# Patient Record
Sex: FEMALE | Race: WHITE | Employment: OTHER | URBAN - METROPOLITAN AREA
[De-identification: names, ages, dates, MRNs, and addresses within clinical notes are randomized per-mention and may not be internally consistent; named-entity substitution may affect disease eponyms.]

---

## 2017-02-17 ENCOUNTER — APPOINTMENT (OUTPATIENT)
Dept: GENERAL RADIOLOGY | Age: 82
DRG: 871 | End: 2017-02-17
Payer: MEDICARE

## 2017-02-17 ENCOUNTER — APPOINTMENT (OUTPATIENT)
Dept: GENERAL RADIOLOGY | Age: 82
DRG: 871 | End: 2017-02-17
Attending: EMERGENCY MEDICINE
Payer: MEDICARE

## 2017-02-17 ENCOUNTER — HOSPITAL ENCOUNTER (INPATIENT)
Age: 82
LOS: 3 days | Discharge: HOME HEALTH | DRG: 871 | End: 2017-02-20
Attending: EMERGENCY MEDICINE | Admitting: INTERNAL MEDICINE
Payer: MEDICARE

## 2017-02-17 DIAGNOSIS — A41.9 SEPSIS, DUE TO UNSPECIFIED ORGANISM: ICD-10-CM

## 2017-02-17 DIAGNOSIS — R41.82 ALTERED MENTAL STATUS, UNSPECIFIED ALTERED MENTAL STATUS TYPE: Primary | ICD-10-CM

## 2017-02-17 PROBLEM — I51.89 DIASTOLIC DYSFUNCTION: Chronic | Status: ACTIVE | Noted: 2017-02-17

## 2017-02-17 PROBLEM — N18.30 CKD (CHRONIC KIDNEY DISEASE) STAGE 3, GFR 30-59 ML/MIN (HCC): Chronic | Status: ACTIVE | Noted: 2017-02-17

## 2017-02-17 PROBLEM — I95.9 HYPOTENSION: Status: ACTIVE | Noted: 2017-02-17

## 2017-02-17 PROBLEM — R09.02 HYPOXIA: Status: ACTIVE | Noted: 2017-02-17

## 2017-02-17 PROBLEM — F02.80 LEWY BODY DEMENTIA (HCC): Chronic | Status: ACTIVE | Noted: 2017-02-17

## 2017-02-17 PROBLEM — G93.40 ACUTE ENCEPHALOPATHY: Status: ACTIVE | Noted: 2017-02-17

## 2017-02-17 PROBLEM — G31.83 LEWY BODY DEMENTIA (HCC): Chronic | Status: ACTIVE | Noted: 2017-02-17

## 2017-02-17 LAB
ALBUMIN SERPL BCP-MCNC: 3.1 G/DL (ref 3.2–4.6)
ALBUMIN/GLOB SERPL: 0.8 {RATIO} (ref 1.2–3.5)
ALP SERPL-CCNC: 114 U/L (ref 50–136)
ALT SERPL-CCNC: 20 U/L (ref 12–65)
AMORPH CRY URNS QL MICRO: ABNORMAL
ANION GAP BLD CALC-SCNC: 10 MMOL/L (ref 7–16)
ARTERIAL PATENCY WRIST A: POSITIVE
AST SERPL W P-5'-P-CCNC: 26 U/L (ref 15–37)
BACTERIA URNS QL MICRO: ABNORMAL /HPF
BASE DEFICIT BLDA-SCNC: 3.4 MMOL/L (ref 0–2)
BASOPHILS # BLD AUTO: 0 K/UL (ref 0–0.2)
BASOPHILS # BLD: 0 % (ref 0–2)
BDY SITE: ABNORMAL
BILIRUB SERPL-MCNC: 0.4 MG/DL (ref 0.2–1.1)
BUN SERPL-MCNC: 26 MG/DL (ref 8–23)
CALCIUM SERPL-MCNC: 9.1 MG/DL (ref 8.3–10.4)
CASTS URNS QL MICRO: 0 /LPF
CHLORIDE SERPL-SCNC: 96 MMOL/L (ref 98–107)
CO2 SERPL-SCNC: 27 MMOL/L (ref 21–32)
COHGB MFR BLD: 0.3 % (ref 0.5–1.5)
CREAT SERPL-MCNC: 1.3 MG/DL (ref 0.6–1)
CRYSTALS URNS QL MICRO: 0 /LPF
DIFFERENTIAL METHOD BLD: ABNORMAL
DO-HGB BLD-MCNC: 5 % (ref 0–5)
EOSINOPHIL # BLD: 0.5 K/UL (ref 0–0.8)
EOSINOPHIL NFR BLD: 3 % (ref 0.5–7.8)
EPI CELLS #/AREA URNS HPF: ABNORMAL /HPF
ERYTHROCYTE [DISTWIDTH] IN BLOOD BY AUTOMATED COUNT: 12.3 % (ref 11.9–14.6)
FIO2 ON VENT: 30 %
GAS FLOW.O2 O2 DELIVERY SYS: 2.5 L/MIN
GLOBULIN SER CALC-MCNC: 4 G/DL (ref 2.3–3.5)
GLUCOSE SERPL-MCNC: 116 MG/DL (ref 65–100)
HCO3 BLDA-SCNC: 20 MMOL/L (ref 22–26)
HCT VFR BLD AUTO: 31.6 % (ref 35.8–46.3)
HGB BLD-MCNC: 10.3 G/DL (ref 11.7–15.4)
HGB BLDMV-MCNC: 9.9 GM/DL (ref 11.7–15)
IMM GRANULOCYTES # BLD: 0.1 K/UL (ref 0–0.5)
IMM GRANULOCYTES NFR BLD AUTO: 0.3 % (ref 0–5)
LACTATE BLD-SCNC: 1.1 MMOL/L (ref 0.5–1.9)
LYMPHOCYTES # BLD AUTO: 3 % (ref 13–44)
LYMPHOCYTES # BLD: 0.5 K/UL (ref 0.5–4.6)
MCH RBC QN AUTO: 30.5 PG (ref 26.1–32.9)
MCHC RBC AUTO-ENTMCNC: 32.6 G/DL (ref 31.4–35)
MCV RBC AUTO: 93.5 FL (ref 79.6–97.8)
METHGB MFR BLD: 0.4 % (ref 0–1.5)
MONOCYTES # BLD: 0.5 K/UL (ref 0.1–1.3)
MONOCYTES NFR BLD AUTO: 3 % (ref 4–12)
MUCOUS THREADS URNS QL MICRO: 0 /LPF
NEUTS SEG # BLD: 15.2 K/UL (ref 1.7–8.2)
NEUTS SEG NFR BLD AUTO: 91 % (ref 43–78)
OXYHGB MFR BLDA: 94.6 % (ref 94–97)
PCO2 BLDA: 31 MMHG (ref 35–45)
PH BLDA: 7.43 [PH] (ref 7.35–7.45)
PLATELET # BLD AUTO: 260 K/UL (ref 150–450)
PMV BLD AUTO: 9.9 FL (ref 10.8–14.1)
PO2 BLDA: 78 MMHG (ref 75–100)
POTASSIUM SERPL-SCNC: 4.5 MMOL/L (ref 3.5–5.1)
PROCALCITONIN SERPL-MCNC: 13 NG/ML
PROT SERPL-MCNC: 7.1 G/DL (ref 6.3–8.2)
RBC # BLD AUTO: 3.38 M/UL (ref 4.05–5.25)
RBC #/AREA URNS HPF: ABNORMAL /HPF
SAO2 % BLD: 95 % (ref 92–98.5)
SERVICE CMNT-IMP: ABNORMAL
SODIUM SERPL-SCNC: 133 MMOL/L (ref 136–145)
TROPONIN I SERPL-MCNC: 0.07 NG/ML (ref 0.02–0.05)
VENTILATION MODE VENT: ABNORMAL
WBC # BLD AUTO: 16.8 K/UL (ref 4.3–11.1)
WBC URNS QL MICRO: ABNORMAL /HPF

## 2017-02-17 PROCEDURE — 83605 ASSAY OF LACTIC ACID: CPT

## 2017-02-17 PROCEDURE — 99285 EMERGENCY DEPT VISIT HI MDM: CPT | Performed by: EMERGENCY MEDICINE

## 2017-02-17 PROCEDURE — 84484 ASSAY OF TROPONIN QUANT: CPT | Performed by: EMERGENCY MEDICINE

## 2017-02-17 PROCEDURE — 93005 ELECTROCARDIOGRAM TRACING: CPT | Performed by: EMERGENCY MEDICINE

## 2017-02-17 PROCEDURE — 74011000258 HC RX REV CODE- 258: Performed by: EMERGENCY MEDICINE

## 2017-02-17 PROCEDURE — 85025 COMPLETE CBC W/AUTO DIFF WBC: CPT | Performed by: EMERGENCY MEDICINE

## 2017-02-17 PROCEDURE — 87086 URINE CULTURE/COLONY COUNT: CPT | Performed by: EMERGENCY MEDICINE

## 2017-02-17 PROCEDURE — 71020 XR CHEST PA LAT: CPT

## 2017-02-17 PROCEDURE — 74011250636 HC RX REV CODE- 250/636

## 2017-02-17 PROCEDURE — 86580 TB INTRADERMAL TEST: CPT

## 2017-02-17 PROCEDURE — 65660000000 HC RM CCU STEPDOWN

## 2017-02-17 PROCEDURE — 77030011943

## 2017-02-17 PROCEDURE — 96375 TX/PRO/DX INJ NEW DRUG ADDON: CPT | Performed by: EMERGENCY MEDICINE

## 2017-02-17 PROCEDURE — 80053 COMPREHEN METABOLIC PANEL: CPT | Performed by: EMERGENCY MEDICINE

## 2017-02-17 PROCEDURE — 36600 WITHDRAWAL OF ARTERIAL BLOOD: CPT

## 2017-02-17 PROCEDURE — 74011000302 HC RX REV CODE- 302

## 2017-02-17 PROCEDURE — 74011250636 HC RX REV CODE- 250/636: Performed by: EMERGENCY MEDICINE

## 2017-02-17 PROCEDURE — 74011000258 HC RX REV CODE- 258

## 2017-02-17 PROCEDURE — 81003 URINALYSIS AUTO W/O SCOPE: CPT | Performed by: EMERGENCY MEDICINE

## 2017-02-17 PROCEDURE — 87040 BLOOD CULTURE FOR BACTERIA: CPT | Performed by: EMERGENCY MEDICINE

## 2017-02-17 PROCEDURE — 96365 THER/PROPH/DIAG IV INF INIT: CPT | Performed by: EMERGENCY MEDICINE

## 2017-02-17 PROCEDURE — 81015 MICROSCOPIC EXAM OF URINE: CPT | Performed by: EMERGENCY MEDICINE

## 2017-02-17 PROCEDURE — 84145 PROCALCITONIN (PCT): CPT | Performed by: EMERGENCY MEDICINE

## 2017-02-17 PROCEDURE — 51701 INSERT BLADDER CATHETER: CPT | Performed by: EMERGENCY MEDICINE

## 2017-02-17 PROCEDURE — 82803 BLOOD GASES ANY COMBINATION: CPT

## 2017-02-17 PROCEDURE — 74011250637 HC RX REV CODE- 250/637

## 2017-02-17 RX ORDER — SERTRALINE HYDROCHLORIDE 100 MG/1
100 TABLET, FILM COATED ORAL DAILY
Status: DISCONTINUED | OUTPATIENT
Start: 2017-02-18 | End: 2017-02-18

## 2017-02-17 RX ORDER — PANTOPRAZOLE SODIUM 40 MG/1
40 TABLET, DELAYED RELEASE ORAL
Status: DISCONTINUED | OUTPATIENT
Start: 2017-02-17 | End: 2017-02-20 | Stop reason: HOSPADM

## 2017-02-17 RX ORDER — ACETAMINOPHEN 500 MG
500 TABLET ORAL
Status: DISCONTINUED | OUTPATIENT
Start: 2017-02-17 | End: 2017-02-20 | Stop reason: HOSPADM

## 2017-02-17 RX ORDER — LANOLIN ALCOHOL/MO/W.PET/CERES
1000 CREAM (GRAM) TOPICAL DAILY
Status: DISCONTINUED | OUTPATIENT
Start: 2017-02-18 | End: 2017-02-20 | Stop reason: HOSPADM

## 2017-02-17 RX ORDER — SODIUM CHLORIDE 0.9 % (FLUSH) 0.9 %
5 SYRINGE (ML) INJECTION EVERY 8 HOURS
Status: DISCONTINUED | OUTPATIENT
Start: 2017-02-17 | End: 2017-02-20 | Stop reason: HOSPADM

## 2017-02-17 RX ORDER — ESOMEPRAZOLE MAGNESIUM 40 MG/1
40 CAPSULE, DELAYED RELEASE ORAL DAILY
COMMUNITY

## 2017-02-17 RX ORDER — ROSUVASTATIN CALCIUM 5 MG/1
5 TABLET, COATED ORAL
Status: DISCONTINUED | OUTPATIENT
Start: 2017-02-17 | End: 2017-02-20 | Stop reason: HOSPADM

## 2017-02-17 RX ORDER — DICLOFENAC SODIUM 10 MG/G
2 GEL TOPICAL 4 TIMES DAILY
COMMUNITY

## 2017-02-17 RX ORDER — LANOLIN ALCOHOL/MO/W.PET/CERES
1000 CREAM (GRAM) TOPICAL DAILY
COMMUNITY

## 2017-02-17 RX ORDER — DOCUSATE SODIUM 100 MG/1
100 CAPSULE, LIQUID FILLED ORAL 3 TIMES DAILY
COMMUNITY

## 2017-02-17 RX ORDER — DULOXETIN HYDROCHLORIDE 60 MG/1
60 CAPSULE, DELAYED RELEASE ORAL
COMMUNITY

## 2017-02-17 RX ORDER — ACETAMINOPHEN 500 MG
500 TABLET ORAL
COMMUNITY

## 2017-02-17 RX ORDER — LANOLIN ALCOHOL/MO/W.PET/CERES
325 CREAM (GRAM) TOPICAL 2 TIMES DAILY WITH MEALS
Status: DISCONTINUED | OUTPATIENT
Start: 2017-02-18 | End: 2017-02-20 | Stop reason: HOSPADM

## 2017-02-17 RX ORDER — ONDANSETRON 2 MG/ML
4 INJECTION INTRAMUSCULAR; INTRAVENOUS
Status: COMPLETED | OUTPATIENT
Start: 2017-02-17 | End: 2017-02-17

## 2017-02-17 RX ORDER — SODIUM CHLORIDE 0.9 % (FLUSH) 0.9 %
5-10 SYRINGE (ML) INJECTION AS NEEDED
Status: DISCONTINUED | OUTPATIENT
Start: 2017-02-17 | End: 2017-02-20 | Stop reason: HOSPADM

## 2017-02-17 RX ORDER — HEPARIN SODIUM 5000 [USP'U]/ML
5000 INJECTION, SOLUTION INTRAVENOUS; SUBCUTANEOUS EVERY 8 HOURS
Status: DISCONTINUED | OUTPATIENT
Start: 2017-02-17 | End: 2017-02-20 | Stop reason: HOSPADM

## 2017-02-17 RX ORDER — ROSUVASTATIN CALCIUM 5 MG/1
5 TABLET, COATED ORAL
COMMUNITY

## 2017-02-17 RX ORDER — ROPINIROLE 0.5 MG/1
0.5 TABLET, FILM COATED ORAL
Status: DISCONTINUED | OUTPATIENT
Start: 2017-02-17 | End: 2017-02-20 | Stop reason: HOSPADM

## 2017-02-17 RX ORDER — GUAIFENESIN 100 MG/5ML
81 LIQUID (ML) ORAL DAILY
Status: DISCONTINUED | OUTPATIENT
Start: 2017-02-18 | End: 2017-02-20 | Stop reason: HOSPADM

## 2017-02-17 RX ORDER — DOCUSATE SODIUM 100 MG/1
100 CAPSULE, LIQUID FILLED ORAL 3 TIMES DAILY
Status: DISCONTINUED | OUTPATIENT
Start: 2017-02-17 | End: 2017-02-20 | Stop reason: HOSPADM

## 2017-02-17 RX ORDER — OXYBUTYNIN CHLORIDE 5 MG/1
5 TABLET ORAL 2 TIMES DAILY
Status: DISCONTINUED | OUTPATIENT
Start: 2017-02-17 | End: 2017-02-20 | Stop reason: HOSPADM

## 2017-02-17 RX ORDER — UREA 10 %
2 LOTION (ML) TOPICAL 2 TIMES DAILY
Status: DISCONTINUED | OUTPATIENT
Start: 2017-02-17 | End: 2017-02-20 | Stop reason: HOSPADM

## 2017-02-17 RX ORDER — CHOLECALCIFEROL (VITAMIN D3) 125 MCG
1 CAPSULE ORAL
Status: DISCONTINUED | OUTPATIENT
Start: 2017-02-18 | End: 2017-02-18

## 2017-02-17 RX ORDER — HYDROCODONE BITARTRATE AND ACETAMINOPHEN 10; 325 MG/1; MG/1
1 TABLET ORAL
Status: DISCONTINUED | OUTPATIENT
Start: 2017-02-17 | End: 2017-02-18

## 2017-02-17 RX ORDER — SIMETHICONE 80 MG
80 TABLET,CHEWABLE ORAL
Status: DISCONTINUED | OUTPATIENT
Start: 2017-02-18 | End: 2017-02-20 | Stop reason: HOSPADM

## 2017-02-17 RX ORDER — OXYCODONE HYDROCHLORIDE 5 MG/1
5 TABLET ORAL
Status: DISCONTINUED | OUTPATIENT
Start: 2017-02-17 | End: 2017-02-18

## 2017-02-17 RX ADMIN — OXYBUTYNIN CHLORIDE 5 MG: 5 TABLET ORAL at 22:45

## 2017-02-17 RX ADMIN — PANTOPRAZOLE SODIUM 40 MG: 40 TABLET, DELAYED RELEASE ORAL at 22:45

## 2017-02-17 RX ADMIN — TUBERCULIN PURIFIED PROTEIN DERIVATIVE 5 UNITS: 5 INJECTION INTRADERMAL at 22:39

## 2017-02-17 RX ADMIN — ACETAMINOPHEN 500 MG: 500 TABLET ORAL at 23:54

## 2017-02-17 RX ADMIN — DOCUSATE SODIUM 100 MG: 100 CAPSULE, LIQUID FILLED ORAL at 22:45

## 2017-02-17 RX ADMIN — CEFTRIAXONE SODIUM 2 G: 2 INJECTION, POWDER, FOR SOLUTION INTRAMUSCULAR; INTRAVENOUS at 17:22

## 2017-02-17 RX ADMIN — ROPINIROLE 0.5 MG: 0.5 TABLET, FILM COATED ORAL at 22:45

## 2017-02-17 RX ADMIN — Medication 5 ML: at 22:45

## 2017-02-17 RX ADMIN — VANCOMYCIN HYDROCHLORIDE 1000 MG: 1 INJECTION, POWDER, LYOPHILIZED, FOR SOLUTION INTRAVENOUS at 22:45

## 2017-02-17 RX ADMIN — SODIUM CHLORIDE 1000 ML: 900 INJECTION, SOLUTION INTRAVENOUS at 17:22

## 2017-02-17 RX ADMIN — AMPICILLIN SODIUM AND SULBACTAM SODIUM 3 G: 2; 1 INJECTION, POWDER, FOR SOLUTION INTRAMUSCULAR; INTRAVENOUS at 19:55

## 2017-02-17 RX ADMIN — HEPARIN SODIUM 5000 UNITS: 5000 INJECTION, SOLUTION INTRAVENOUS; SUBCUTANEOUS at 22:44

## 2017-02-17 RX ADMIN — ONDANSETRON 4 MG: 2 INJECTION, SOLUTION INTRAMUSCULAR; INTRAVENOUS at 17:22

## 2017-02-17 RX ADMIN — ROSUVASTATIN CALCIUM 5 MG: 5 TABLET, FILM COATED ORAL at 22:45

## 2017-02-17 RX ADMIN — SODIUM CHLORIDE 1000 ML: 900 INJECTION, SOLUTION INTRAVENOUS at 18:31

## 2017-02-17 RX ADMIN — LACTOBACILLUS TAB 2 TABLET: TAB at 22:45

## 2017-02-17 RX ADMIN — OXYCODONE HYDROCHLORIDE 5 MG: 5 TABLET ORAL at 22:45

## 2017-02-17 NOTE — ED PROVIDER NOTES
HPI Comments: Patient with history of high blood pressure heart disease and congestive heart failure and renal failure. Was started on an antibiotic for urinary tract infection one week ago. Macrobid. Unable to take it twice a day to 2 nausea and vomiting. Patient had fever and chills yesterday and today with some confusion waxing and waning. Went to her primary care doctor's office today and found with a blood pressure 60/47 so sent here. Once here The pressure improved the patient still with confusion. Patient is a 80 y.o. female presenting with fatigue. The history is provided by the patient. No  was used. Fatigue   This is a new problem. The current episode started yesterday. The problem has been gradually worsening. There was no focality noted. Primary symptoms include mental status change and disorientation. Pertinent negatives include no focal weakness, no loss of balance, no slurred speech, no speech difficulty, no agitation and no unresponsiveness. Patient reports a subjective fever - was not measured. Associated symptoms include confusion. Pertinent negatives include no shortness of breath, no chest pain, no vomiting, no nausea, no bowel incontinence and no bladder incontinence.         Past Medical History:   Diagnosis Date    Acute myocardial infarction of other anterior wall, initial episode of care [facility]) 8/30/2012     MI - Had a stent placed August 81,5840    Acute systolic congestive heart failure- in setting of anterior MI, EF 35-40% 5/81/0131    Acute systolic heart failure (HCC)      resolved at discharge, EF 35% at time of MI, 55% by echo prior to discharge    ARF (acute renal failure) ([facility] [place] [address] 9/29/2012    Arthritis     CAD (coronary artery disease)      MI    Carotid artery stenosis without cerebral infarction     Chronic pain- chronic narcotic use for spinal stenosis 8/30/2012    Coronary atherosclerosis of native coronary artery 3/16/2013    Diarrhea 10/2/2012    Dyslipidemia 8/30/2012    Dyspnea      unspecified    Essential hypertension, benign 8/30/2012    Heart failure (HCC)      denies heart failure    Hyperlipidemia      other unsp dyslipidemia    Hypertension      controlled, benign    Ischemic colitis ([facility] [address]     Lower GI bleed 3/12/2015    Myocardial infarction, anterolateral wall, subsequent care [facility])      s/p 2.5mm Xience stent to focal lesion in mid LAD 8/2012    N&V (nausea and vomiting) 3/16/2013    Sepsis ([facility] [address] 2/12/2014    Spinal stenosis 8/30/2012    ST elevation (STEMI) myocardial infarction involving left anterior descending coronary artery (HCC)     Syncope and collapse 2/12/2014       Past Surgical History:   Procedure Laterality Date    Hx cholecystectomy      Hx gyn       hysterectomy    Hx appendectomy      Hx orthopaedic       moreno knees, ankle and spinal cord stimulator    Hx other surgical       cord stimulator for pain control removed    Hx coronary stent placement       2012 for MI    Pr cardiac surg procedure unlist       stent         Family History:   Problem Relation Age of Onset    Hypertension Mother     Stroke Mother     Hypertension Father        Social History     Social History    Marital status:      Spouse name: N/A    Number of children: N/A    Years of education: N/A     Occupational History    Not on file. Social History Main Topics    Smoking status: Never Smoker    Smokeless tobacco: Not on file    Alcohol use No    Drug use: No    Sexual activity: Not on file     Other Topics Concern    Not on file     Social History Narrative    Lives at home independently    Has 2 children        Has living will, DNR    Channing Najera DO                 ALLERGIES: Morphine; Other medication; and Sulfa (sulfonamide antibiotics)    Review of Systems   Unable to perform ROS: Mental status change   Constitutional: Positive for fatigue.    Respiratory: Negative for shortness of breath. Cardiovascular: Negative for chest pain. Gastrointestinal: Negative for bowel incontinence, nausea and vomiting. Genitourinary: Negative for bladder incontinence. Neurological: Negative for focal weakness, speech difficulty and loss of balance. Psychiatric/Behavioral: Positive for confusion. Negative for agitation. Vitals:    02/17/17 1622   BP: 159/74   Pulse: 87   Resp: 18   Temp: (!) 100.7 °F (38.2 °C)   SpO2: 93%   Weight: 62.1 kg (137 lb)   Height: 4' 11\" (1.499 m)            Physical Exam   Constitutional: She appears well-developed and well-nourished. HENT:   Head: Normocephalic and atraumatic. Neck: Normal range of motion. Neck supple. Cardiovascular: Normal rate and regular rhythm. Pulmonary/Chest: Effort normal and breath sounds normal. She has no wheezes. Abdominal: Soft. Bowel sounds are normal. There is tenderness (suprapubic). There is no rebound and no guarding. Musculoskeletal: Normal range of motion. She exhibits no edema. Neurological: She is alert. No cranial nerve deficit. Skin: Skin is warm and dry. Nursing note and vitals reviewed. MDM  Number of Diagnoses or Management Options  Diagnosis management comments: Pt with low grade fever, elevated WBC, and elevated procalcitonin. UA clean here but on macrobid not as directed. Will treat as uti and admit. Confusion present. Amount and/or Complexity of Data Reviewed  Clinical lab tests: ordered and reviewed  Tests in the radiology section of CPT®: ordered and reviewed  Tests in the medicine section of CPT®: ordered and reviewed    Patient Progress  Patient progress: stable    ED Course       Procedures      UA neg.        Results Include:    Recent Results (from the past 24 hour(s))   CBC WITH AUTOMATED DIFF    Collection Time: 02/17/17  4:30 PM   Result Value Ref Range    WBC 16.8 (H) 4.3 - 11.1 K/uL    RBC 3.38 (L) 4.05 - 5.25 M/uL    HGB 10.3 (L) 11.7 - 15.4 g/dL    HCT 31.6 (L) 35.8 - 46.3 % MCV 93.5 79.6 - 97.8 FL    MCH 30.5 26.1 - 32.9 PG    MCHC 32.6 31.4 - 35.0 g/dL    RDW 12.3 11.9 - 14.6 %    PLATELET 756 209 - 607 K/uL    MPV 9.9 (L) 10.8 - 14.1 FL    DF AUTOMATED      NEUTROPHILS 91 (H) 43 - 78 %    LYMPHOCYTES 3 (L) 13 - 44 %    MONOCYTES 3 (L) 4.0 - 12.0 %    EOSINOPHILS 3 0.5 - 7.8 %    BASOPHILS 0 0.0 - 2.0 %    IMMATURE GRANULOCYTES 0.3 0.0 - 5.0 %    ABS. NEUTROPHILS 15.2 (H) 1.7 - 8.2 K/UL    ABS. LYMPHOCYTES 0.5 0.5 - 4.6 K/UL    ABS. MONOCYTES 0.5 0.1 - 1.3 K/UL    ABS. EOSINOPHILS 0.5 0.0 - 0.8 K/UL    ABS. BASOPHILS 0.0 0.0 - 0.2 K/UL    ABS. IMM. GRANS. 0.1 0.0 - 0.5 K/UL   METABOLIC PANEL, COMPREHENSIVE    Collection Time: 02/17/17  4:30 PM   Result Value Ref Range    Sodium 133 (L) 136 - 145 mmol/L    Potassium 4.5 3.5 - 5.1 mmol/L    Chloride 96 (L) 98 - 107 mmol/L    CO2 27 21 - 32 mmol/L    Anion gap 10 7 - 16 mmol/L    Glucose 116 (H) 65 - 100 mg/dL    BUN 26 (H) 8 - 23 MG/DL    Creatinine 1.30 (H) 0.6 - 1.0 MG/DL    GFR est AA 50 (L) >60 ml/min/1.73m2    GFR est non-AA 41 (L) >60 ml/min/1.73m2    Calcium 9.1 8.3 - 10.4 MG/DL    Bilirubin, total 0.4 0.2 - 1.1 MG/DL    ALT (SGPT) 20 12 - 65 U/L    AST (SGOT) 26 15 - 37 U/L    Alk. phosphatase 114 50 - 136 U/L    Protein, total 7.1 6.3 - 8.2 g/dL    Albumin 3.1 (L) 3.2 - 4.6 g/dL    Globulin 4.0 (H) 2.3 - 3.5 g/dL    A-G Ratio 0.8 (L) 1.2 - 3.5     TROPONIN I    Collection Time: 02/17/17  4:30 PM   Result Value Ref Range    Troponin-I, Qt. 0.07 (H) 0.02 - 0.05 NG/ML   PROCALCITONIN    Collection Time: 02/17/17  4:30 PM   Result Value Ref Range    Procalcitonin 13.0 ng/mL   POC LACTIC ACID    Collection Time: 02/17/17  4:48 PM   Result Value Ref Range    Lactic Acid (POC) 1.1 0.5 - 1.9 mmol/L     XR CHEST PA LAT (Final result) Result time: 02/17/17 17:10:42     Final result by Joaquin Johnson DO (02/17/17 17:10:42)     Impression:     IMPRESSION:  1.  Mild interstitial prominence at the lung bases could represent underlying  chronic change versus interstitial edema. 2. Hiatal hernia.       Narrative:     Two view chest:  02/17/2017    History: weakness. Comparison: 12/01/2016    Findings: The heart is normal in size. There is a large hiatal hernia. There is  mild interstitial prominence at the lung bases. There are no pleural effusions. There is diffuse osteopenia. Remote compression fracture is present at the  thoracolumbar junction.  A vascular stent overlies the upper abdomen posteriorly.

## 2017-02-17 NOTE — ED TRIAGE NOTES
Pt arrives per EMS from MD's office where they state pts blood pressure was 60/30 on patients arrival to the MD office. EMS 1st blood pressure for them 128/60. Pt went to MD's office for complaints of weakness.

## 2017-02-17 NOTE — ED NOTES
Patient urinated the bed. Bed has been changed, patient cleaned and a new brief applied. Patient's urine was straw colored and WNL.

## 2017-02-17 NOTE — IP AVS SNAPSHOT
Lenin Chou 
 
 
 [address] 
[phone] Patient: Shawnee Vanegas MRN: [id number] [facility]:8/8/0878 You are allergic to the following Allergen Reactions Morphine Nausea and Vomiting Other Medication Other (comments) \"Took steroids this week and made my face red. \"  
    
 Sulfa (Sulfonamide Antibiotics) Rash Immunizations Administered for This Admission Name Date  
 TB Skin Test (PPD) Intradermal 2/17/2017 Recent Documentation Height Weight BMI OB Status Smoking Status 1.499 m 62.1 kg 27.67 kg/m2 Hysterectomy Never Smoker Unresulted Labs Order Current Status CULTURE, BLOOD Preliminary result CULTURE, BLOOD Preliminary result Emergency Contacts Name Discharge Info Relation Home Work Mobile Charleen Vasquez  Other Relative [6] [phone] CHIQUITA Patrick  Child [2] [phone] KobeGuillermina duarte  Other Relative [6] [phone] About your hospitalization You were admitted on:  February 17, 2017 You last received care in the:  [facility] SUR[facility] You were discharged on:  February 20, 2017 Unit phone number:  [phone] Why you were hospitalized Your primary diagnosis was:  Acute Cystitis Without Hematuria Your diagnoses also included:  Sepsis Due To Escherichia Coli (Hcc), Hypoxia, Encephalopathy Due To Infection, Lewy Body Dementia, Spinal Stenosis, Essential Hypertension, Benign, Dyslipidemia, Chronic Pain, Coronary Atherosclerosis Of Native Coronary Artery, Ckd (Chronic Kidney Disease) Stage 3, Gfr 30-59 Ml/Min, Hypotension, Diastolic Dysfunction, Dnr (Do Not Resuscitate) Providers Seen During Your Hospitalizations Provider Role Specialty Primary office phone Hugo Moise MD Attending Provider Emergency Medicine [phone] Maria M Mota DO Attending Provider Internal Medicine [phone] Roberth Bustamante MD Attending Provider Internal Medicine [phone] Your Primary Care Physician (PCP) Primary Care Physician Office Phone Office Fax [address] [facility], [address] Kiersten Kasper [phone] Follow-up Information Follow up With Details Comments Contact Info Jeimy Naranjo MD On 2/28/2017 at 9:[address] [place] [address] 
[phone] [address] [facility] [address]   [address] [place] [address] [address] [id number] [phone] Current Discharge Medication List  
  
START taking these medications Dose & Instructions Dispensing Information Comments Morning Noon Evening Bedtime  
 cefpodoxime 200 mg tablet Commonly known as:  Bill Ligas Your next dose is: Today Dose:  200 mg Take 1 Tab by mouth every twelve (12) hours for 3 days. Quantity:  6 Tab Refills:  0 CONTINUE these medications which have CHANGED Dose & Instructions Dispensing Information Comments Morning Noon Evening Bedtime  
 carvedilol 3.125 mg tablet Commonly known as:  Jestine Pellet What changed:  how much to take Your next dose is: Today Dose:  3.125 mg Take 1 Tab by mouth two (2) times daily (with meals). Quantity:  60 Tab Refills:  0  
     
   
   
  
   
  
 ferrous sulfate 325 mg (65 mg iron) EC tablet Commonly known as:  IRON What changed:  Another medication with the same name was removed. Continue taking this medication, and follow the directions you see here. Your next dose is: Today Dose:  325 mg Take 1 Tab by mouth two (2) times a day. Quantity:  60 Tab Refills:  0  
     
   
   
  
   
  
 lisinopril 20 mg tablet Commonly known as:  Caprice Loss What changed:   
- how much to take 
- additional instructions Your next dose is:  Tomorrow Dose:  30 mg Take 1.5 Tabs by mouth daily. Quantity:  45 Tab Refills:  0 ondansetron 4 mg disintegrating tablet Commonly known as:  ZOFRAN ODT What changed:  how much to take Dose:  4 mg Take 1 Tab by mouth every eight (8) hours as needed for Nausea. Quantity:  10 Tab Refills:  0  
     
   
   
   
  
 oxyCODONE IR 5 mg immediate release tablet Commonly known as:  Eric Strickland What changed:  additional instructions Dose:  5 mg Take 1 Tab by mouth every four (4) hours as needed for Pain. Max Daily Amount: 30 mg.  
 Quantity:  90 Tab Refills:  0 CONTINUE these medications which have NOT CHANGED Dose & Instructions Dispensing Information Comments Morning Noon Evening Bedtime  
 acetaminophen 500 mg tablet Commonly known as:  TYLENOL Dose:  500 mg Take 500 mg by mouth every six (6) hours as needed for Pain. Indications: Pain Refills:  0  
     
   
   
   
  
 aspirin 81 mg chewable tablet Your next dose is:  Tomorrow Dose:  81 mg Take 1 Tab by mouth daily. Resume Aspirin on March 3rd (  Will complete 7 days off   ASA ) Quantity:  30 Tab Refills:  0  
     
   
   
   
  
 COLACE 100 mg capsule Generic drug:  docusate sodium Your next dose is: Today Dose:  100 mg Take 100 mg by mouth three (3) times daily. Indications: Constipation Refills:  0  
     
   
   
  
   
  
  
 CRESTOR 5 mg tablet Generic drug:  rosuvastatin Your next dose is: Today Dose:  5 mg Take 5 mg by mouth every Monday, Wednesday, Friday. Refills:  0  
     
   
   
   
  
  
 cyanocobalamin 1,000 mcg tablet Your next dose is:  Tomorrow Dose:  1000 mcg Take 1,000 mcg by mouth daily. Indications: PREVENTION OF VITAMIN B12 DEFICIENCY Refills:  0  
     
   
   
   
  
 diclofenac 1 % Gel Commonly known as:  VOLTAREN Your next dose is: Today Dose:  2 g Apply 2 g to affected area four (4) times daily. Indications: OSTEOARTHRITIS Refills:  0 DULoxetine 60 mg capsule Commonly known as:  CYMBALTA Your next dose is: Today Dose:  60 mg Take 60 mg by mouth nightly. Refills:  0  
     
   
   
   
  
  
 furosemide 20 mg tablet Commonly known as:  LASIX Dose:  20 mg Take 1 Tab by mouth daily as needed. Quantity:  15 Tab Refills:  5 HYDROcodone-acetaminophen  mg tablet Commonly known as:  Nany Smith Dose:  1 Tab Take 1 Tab by mouth every six (6) hours as needed for Pain. Max Daily Amount: 4 Tabs. Quantity:  20 Tab Refills:  0  
     
   
   
   
  
 lactase 3,000 unit tablet Commonly known as:  Owens Ee Dose:  1 Tab Take 1 Tab by mouth three (3) times daily (with meals). Take if patient will eat dairy products Quantity:  90 Tab Refills:  0 NexIUM 40 mg capsule Generic drug:  esomeprazole Your next dose is:  Tomorrow Take  by mouth daily. Refills:  0  
     
   
   
   
  
 nitroglycerin 0.4 mg SL tablet Commonly known as:  NITROSTAT Dose:  0.4 mg  
1 Tab by SubLINGual route every five (5) minutes as needed for Chest Pain. Quantity:  1 Bottle Refills:  3  
     
   
   
   
  
 oxybutynin 5 mg tablet Commonly known as:  BHOTNKUL Your next dose is: Today Dose:  5 mg Take 5 mg by mouth two (2) times a day. Refills:  0  
     
   
   
  
   
  
 pantoprazole 40 mg tablet Commonly known as:  PROTONIX Your next dose is: Today Dose:  40 mg Take 1 Tab by mouth two (2) times a day. Quantity:  60 Tab Refills:  6 PEPCID COMPLETE PO Your next dose is:  Tomorrow Take  by mouth. Refills:  0  
     
   
   
   
  
 potassium chloride 10 mEq tablet Commonly known as:  K-DUR KLOR-CON Dose:  10 mEq Take 1 Tab by mouth daily as needed. Only take this med on days when she  takes Lasix the two go together Quantity:  15 Tab Refills:  5 PROBIOTIC 4X 10-15 mg Tbec Generic drug:  B.infantis-B.ani-B.long-B.bifi Take  by mouth. Refills:  0  
     
   
   
   
  
 rOPINIRole 5 mg tablet Commonly known as:  Baron Yañez Your next dose is: Today Dose:  0.5 mg Take 0.5 mg by mouth nightly. Refills:  0  
     
   
   
   
  
  
 simethicone 80 mg chewable tablet Commonly known as:  Maria Elena Rodriguez Your next dose is: Today Dose:  80 mg Take 1 Tab by mouth Before breakfast, lunch, and dinner. Quantity:  90 Tab Refills:  0  
     
   
   
  
   
  
 traMADol 50 mg tablet Commonly known as:  ULTRAM  
   
 Dose:  50 mg Take 1 Tab by mouth every six (6) hours as needed. Max Daily Amount: 200 mg. Quantity:  90 Tab Refills:  0  
     
   
   
   
  
 ZOLOFT 100 mg tablet Generic drug:  sertraline Your next dose is:  Tomorrow Dose:  100 mg Take 100 mg by mouth daily. Refills:  0 Where to Get Your Medications Information on where to get these meds will be given to you by the nurse or doctor. ! Ask your nurse or doctor about these medications  
  cefpodoxime 200 mg tablet Discharge Instructions DISCHARGE SUMMARY from Nurse The following personal items are in your possession at time of discharge: 
 
Dental Appliances: Partials Visual Aid: Glasses Home Medications: None Jewelry: With patient Clothing: With patient Other Valuables: None PATIENT INSTRUCTIONS: 
 
 
F-face looks uneven A-arms unable to move or move unevenly S-speech slurred or non-existent T-time-call 911 as soon as signs and symptoms begin-DO NOT go Back to bed or wait to see if you get better-TIME IS BRAIN. Warning Signs of HEART ATTACK Call 911 if you have these symptoms: 
? Chest discomfort. Most heart attacks involve discomfort in the center of the chest that lasts more than a few minutes, or that goes away and comes back. It can feel like uncomfortable pressure, squeezing, fullness, or pain. ? Discomfort in other areas of the upper body. Symptoms can include pain or discomfort in one or both arms, the back, neck, jaw, or stomach. ? Shortness of breath with or without chest discomfort. ? Other signs may include breaking out in a cold sweat, nausea, or lightheadedness. Don't wait more than five minutes to call [address]! Fast action can save your life. Calling 911 is almost always the fastest way to get lifesaving treatment. Emergency Medical Services staff can begin treatment when they arrive  up to an hour sooner than if someone gets to the hospital by car. The discharge information has been reviewed with the patient. The patient verbalized understanding. Discharge medications reviewed with the patient and appropriate educational materials and side effects teaching were provided. Urinary Tract Infection in Women: Care Instructions Your Care Instructions A urinary tract infection, or UTI, is a general term for an infection anywhere between the kidneys and the urethra (where urine comes out). Most UTIs are bladder infections. They often cause pain or burning when you urinate. UTIs are caused by bacteria and can be cured with antibiotics. Be sure to complete your treatment so that the infection goes away. Follow-up care is a key part of your treatment and safety. Be sure to make and go to all appointments, and call your doctor if you are having problems.  It's also a good idea to know your test results and keep a list of the medicines you take. How can you care for yourself at home? · Take your antibiotics as directed. Do not stop taking them just because you feel better. You need to take the full course of antibiotics. · Drink extra water and other fluids for the next day or two. This may help wash out the bacteria that are causing the infection. (If you have kidney, heart, or liver disease and have to limit fluids, talk with your doctor before you increase your fluid intake.) · Avoid drinks that are carbonated or have caffeine. They can irritate the bladder. · Urinate often. Try to empty your bladder each time. · To relieve pain, take a hot bath or lay a heating pad set on low over your lower belly or genital area. Never go to sleep with a heating pad in place. To prevent UTIs · Drink plenty of water each day. This helps you urinate often, which clears bacteria from your system. (If you have kidney, heart, or liver disease and have to limit fluids, talk with your doctor before you increase your fluid intake.) · Consider adding cranberry juice to your diet. · Urinate when you need to. · Urinate right after you have sex. · Change sanitary pads often. · Avoid douches, bubble baths, feminine hygiene sprays, and other feminine hygiene products that have deodorants. · After going to the bathroom, wipe from front to back. When should you call for help? Call your doctor now or seek immediate medical care if: · Symptoms such as fever, chills, nausea, or vomiting get worse or appear for the first time. · You have new pain in your back just below your rib cage. This is called flank pain. · There is new blood or pus in your urine. · You have any problems with your antibiotic medicine. Watch closely for changes in your health, and be sure to contact your doctor if: 
· You are not getting better after taking an antibiotic for 2 days. · Your symptoms go away but then come back. Where can you learn more? Go to http://steven-prachi.info/. Enter F229 in the search box to learn more about \"Urinary Tract Infection in Women: Care Instructions. \" Current as of: August 12, 2016 Content Version: 11.1 © 8397-3687 [employer]. Care instructions adapted under license by [employer] (which disclaims liability or warranty for this information). If you have questions about a medical condition or this instruction, always ask your healthcare professional. [facility] any warranty or liability for your use of this information. Discharge Orders None [facility] Announcement We are excited to announce that we are making your provider's discharge notes available to you in [facility]. You will see these notes when they are completed and signed by the physician that discharged you from your recent hospital stay. If you have any questions or concerns about any information you see in [facility], please call the Health Information Department where you were seen or reach out to your Primary Care Provider for more information about your plan of care. Introducing hospitals & HEALTH SERVICES! [employer] introduces [facility] patient portal. Now you can access parts of your medical record, email your doctor's office, and request medication refills online. 1. In your internet browser, go to [email or web address] 2. Click on the First Time User? Click Here link in the Sign In box. You will see the New Member Sign Up page. 3. Enter your [facility] Access Code exactly as it appears below. You will not need to use this code after youve completed the sign-up process. If you do not sign up before the expiration date, you must request a new code. · [facility] Access Code: [email or web address] Expires: 3/1/2017  6:29 PM 
 
4. Enter the last four digits of your Social Security Number (xxxx) and Date of Birth (mm/dd/yyyy) as indicated and click Submit.  You will be taken to the next sign-up page. 5. Create a [employer] ID. This will be your [employer] login ID and cannot be changed, so think of one that is secure and easy to remember. 6. Create a [employer] password. You can change your password at any time. 7. Enter your Password Reset Question and Answer. This can be used at a later time if you forget your password. 8. Enter your e-mail address. You will receive e-mail notification when new information is available in [address] 9. Click Sign Up. You can now view and download portions of your medical record. 10. Click the Download Summary menu link to download a portable copy of your medical information. If you have questions, please visit the Frequently Asked Questions section of the [employer] website. Remember, [employer] is NOT to be used for urgent needs. For medical emergencies, dial 911. Now available from your iPhone and Android! General Information Please provide this summary of care documentation to your next provider. Patient Signature:  ____________________________________________________________ Date:  ____________________________________________________________  
  
[facility] Provider Signature:  ____________________________________________________________ Date:  ____________________________________________________________

## 2017-02-18 ENCOUNTER — APPOINTMENT (OUTPATIENT)
Dept: GENERAL RADIOLOGY | Age: 82
DRG: 871 | End: 2017-02-18
Payer: MEDICARE

## 2017-02-18 PROBLEM — R09.02 HYPOXIA: Status: RESOLVED | Noted: 2017-02-17 | Resolved: 2017-02-18

## 2017-02-18 PROBLEM — I95.9 HYPOTENSION: Status: RESOLVED | Noted: 2017-02-17 | Resolved: 2017-02-18

## 2017-02-18 PROBLEM — I50.42 CHRONIC COMBINED SYSTOLIC AND DIASTOLIC CONGESTIVE HEART FAILURE (HCC): Chronic | Status: ACTIVE | Noted: 2017-02-18

## 2017-02-18 PROBLEM — Z66 DNR (DO NOT RESUSCITATE): Chronic | Status: ACTIVE | Noted: 2017-02-18

## 2017-02-18 PROBLEM — B99.9 ENCEPHALOPATHY DUE TO INFECTION: Status: ACTIVE | Noted: 2017-02-17

## 2017-02-18 PROBLEM — G93.49 ENCEPHALOPATHY DUE TO INFECTION: Status: ACTIVE | Noted: 2017-02-17

## 2017-02-18 PROBLEM — N30.00 ACUTE CYSTITIS WITHOUT HEMATURIA: Status: ACTIVE | Noted: 2017-02-18

## 2017-02-18 PROBLEM — I50.42 CHRONIC COMBINED SYSTOLIC AND DIASTOLIC CONGESTIVE HEART FAILURE (HCC): Status: ACTIVE | Noted: 2017-02-18

## 2017-02-18 LAB
ANION GAP BLD CALC-SCNC: 8 MMOL/L (ref 7–16)
ATRIAL RATE: 94 BPM
BASOPHILS # BLD AUTO: 0 K/UL (ref 0–0.2)
BASOPHILS # BLD: 0 % (ref 0–2)
BUN SERPL-MCNC: 22 MG/DL (ref 8–23)
CALCIUM SERPL-MCNC: 8.7 MG/DL (ref 8.3–10.4)
CALCULATED P AXIS, ECG09: 56 DEGREES
CALCULATED R AXIS, ECG10: 99 DEGREES
CALCULATED T AXIS, ECG11: 108 DEGREES
CHLORIDE SERPL-SCNC: 104 MMOL/L (ref 98–107)
CO2 SERPL-SCNC: 26 MMOL/L (ref 21–32)
CREAT SERPL-MCNC: 1.09 MG/DL (ref 0.6–1)
DIAGNOSIS, 93000: NORMAL
DIASTOLIC BP, ECG02: NORMAL MMHG
DIFFERENTIAL METHOD BLD: ABNORMAL
EOSINOPHIL # BLD: 0.5 K/UL (ref 0–0.8)
EOSINOPHIL NFR BLD: 5 % (ref 0.5–7.8)
ERYTHROCYTE [DISTWIDTH] IN BLOOD BY AUTOMATED COUNT: 12.5 % (ref 11.9–14.6)
GLUCOSE SERPL-MCNC: 78 MG/DL (ref 65–100)
HCT VFR BLD AUTO: 27.7 % (ref 35.8–46.3)
HGB BLD-MCNC: 8.9 G/DL (ref 11.7–15.4)
IMM GRANULOCYTES # BLD: 0 K/UL (ref 0–0.5)
IMM GRANULOCYTES NFR BLD AUTO: 0.2 % (ref 0–5)
LYMPHOCYTES # BLD AUTO: 9 % (ref 13–44)
LYMPHOCYTES # BLD: 1 K/UL (ref 0.5–4.6)
MCH RBC QN AUTO: 30.2 PG (ref 26.1–32.9)
MCHC RBC AUTO-ENTMCNC: 32.1 G/DL (ref 31.4–35)
MCV RBC AUTO: 93.9 FL (ref 79.6–97.8)
MM INDURATION POC: NORMAL MM (ref 0–5)
MONOCYTES # BLD: 0.6 K/UL (ref 0.1–1.3)
MONOCYTES NFR BLD AUTO: 5 % (ref 4–12)
NEUTS SEG # BLD: 8.7 K/UL (ref 1.7–8.2)
NEUTS SEG NFR BLD AUTO: 81 % (ref 43–78)
P-R INTERVAL, ECG05: 170 MS
PLATELET # BLD AUTO: 234 K/UL (ref 150–450)
PMV BLD AUTO: 9.8 FL (ref 10.8–14.1)
POTASSIUM SERPL-SCNC: 3.8 MMOL/L (ref 3.5–5.1)
PPD POC: NORMAL NEGATIVE
Q-T INTERVAL, ECG07: 324 MS
QRS DURATION, ECG06: 82 MS
QTC CALCULATION (BEZET), ECG08: 400 MS
RBC # BLD AUTO: 2.95 M/UL (ref 4.05–5.25)
SODIUM SERPL-SCNC: 138 MMOL/L (ref 136–145)
SYSTOLIC BP, ECG01: NORMAL MMHG
VENTRICULAR RATE, ECG03: 92 BPM
WBC # BLD AUTO: 10.9 K/UL (ref 4.3–11.1)

## 2017-02-18 PROCEDURE — 74011250636 HC RX REV CODE- 250/636

## 2017-02-18 PROCEDURE — 97161 PT EVAL LOW COMPLEX 20 MIN: CPT

## 2017-02-18 PROCEDURE — 71020 XR CHEST PA LAT: CPT

## 2017-02-18 PROCEDURE — 74011000258 HC RX REV CODE- 258

## 2017-02-18 PROCEDURE — 65660000000 HC RM CCU STEPDOWN

## 2017-02-18 PROCEDURE — 36415 COLL VENOUS BLD VENIPUNCTURE: CPT

## 2017-02-18 PROCEDURE — 74011250636 HC RX REV CODE- 250/636: Performed by: INTERNAL MEDICINE

## 2017-02-18 PROCEDURE — 74011250637 HC RX REV CODE- 250/637: Performed by: INTERNAL MEDICINE

## 2017-02-18 PROCEDURE — 74011000258 HC RX REV CODE- 258: Performed by: INTERNAL MEDICINE

## 2017-02-18 PROCEDURE — 80048 BASIC METABOLIC PNL TOTAL CA: CPT

## 2017-02-18 PROCEDURE — 85025 COMPLETE CBC W/AUTO DIFF WBC: CPT

## 2017-02-18 PROCEDURE — 74011250637 HC RX REV CODE- 250/637

## 2017-02-18 PROCEDURE — 92610 EVALUATE SWALLOWING FUNCTION: CPT

## 2017-02-18 RX ORDER — TRAMADOL HYDROCHLORIDE 50 MG/1
50 TABLET ORAL
Status: DISCONTINUED | OUTPATIENT
Start: 2017-02-18 | End: 2017-02-18

## 2017-02-18 RX ORDER — FUROSEMIDE 20 MG/1
20 TABLET ORAL DAILY
Status: DISCONTINUED | OUTPATIENT
Start: 2017-02-18 | End: 2017-02-19

## 2017-02-18 RX ORDER — ONDANSETRON 2 MG/ML
4 INJECTION INTRAMUSCULAR; INTRAVENOUS
Status: DISCONTINUED | OUTPATIENT
Start: 2017-02-18 | End: 2017-02-20 | Stop reason: HOSPADM

## 2017-02-18 RX ORDER — ZOLPIDEM TARTRATE 5 MG/1
5 TABLET ORAL
Status: DISCONTINUED | OUTPATIENT
Start: 2017-02-18 | End: 2017-02-20 | Stop reason: HOSPADM

## 2017-02-18 RX ORDER — GUAIFENESIN/DEXTROMETHORPHAN 100-10MG/5
10 SYRUP ORAL
Status: DISCONTINUED | OUTPATIENT
Start: 2017-02-18 | End: 2017-02-20 | Stop reason: HOSPADM

## 2017-02-18 RX ORDER — CARVEDILOL 3.12 MG/1
3.12 TABLET ORAL 2 TIMES DAILY WITH MEALS
Status: DISCONTINUED | OUTPATIENT
Start: 2017-02-18 | End: 2017-02-20 | Stop reason: HOSPADM

## 2017-02-18 RX ORDER — POTASSIUM CHLORIDE 750 MG/1
10 TABLET, EXTENDED RELEASE ORAL DAILY
Status: DISCONTINUED | OUTPATIENT
Start: 2017-02-18 | End: 2017-02-18

## 2017-02-18 RX ORDER — DIPHENHYDRAMINE HYDROCHLORIDE 50 MG/ML
12.5 INJECTION, SOLUTION INTRAMUSCULAR; INTRAVENOUS
Status: DISCONTINUED | OUTPATIENT
Start: 2017-02-18 | End: 2017-02-20 | Stop reason: HOSPADM

## 2017-02-18 RX ORDER — BISACODYL 5 MG
5 TABLET, DELAYED RELEASE (ENTERIC COATED) ORAL DAILY PRN
Status: DISCONTINUED | OUTPATIENT
Start: 2017-02-18 | End: 2017-02-20 | Stop reason: HOSPADM

## 2017-02-18 RX ORDER — OXYCODONE HYDROCHLORIDE 5 MG/1
5 TABLET ORAL
Status: DISCONTINUED | OUTPATIENT
Start: 2017-02-18 | End: 2017-02-20 | Stop reason: HOSPADM

## 2017-02-18 RX ORDER — AMOXICILLIN 250 MG
2 CAPSULE ORAL
Status: DISCONTINUED | OUTPATIENT
Start: 2017-02-18 | End: 2017-02-20 | Stop reason: HOSPADM

## 2017-02-18 RX ORDER — POTASSIUM CHLORIDE 20MEQ/15ML
10 LIQUID (ML) ORAL DAILY
Status: DISCONTINUED | OUTPATIENT
Start: 2017-02-19 | End: 2017-02-18

## 2017-02-18 RX ADMIN — HEPARIN SODIUM 5000 UNITS: 5000 INJECTION, SOLUTION INTRAVENOUS; SUBCUTANEOUS at 05:24

## 2017-02-18 RX ADMIN — OXYCODONE HYDROCHLORIDE 5 MG: 5 TABLET ORAL at 21:42

## 2017-02-18 RX ADMIN — CEFTRIAXONE 1 G: 1 INJECTION, POWDER, FOR SOLUTION INTRAMUSCULAR; INTRAVENOUS at 13:35

## 2017-02-18 RX ADMIN — HEPARIN SODIUM 5000 UNITS: 5000 INJECTION, SOLUTION INTRAVENOUS; SUBCUTANEOUS at 13:36

## 2017-02-18 RX ADMIN — PANTOPRAZOLE SODIUM 40 MG: 40 TABLET, DELAYED RELEASE ORAL at 18:32

## 2017-02-18 RX ADMIN — HEPARIN SODIUM 5000 UNITS: 5000 INJECTION, SOLUTION INTRAVENOUS; SUBCUTANEOUS at 21:42

## 2017-02-18 RX ADMIN — SIMETHICONE CHEW TAB 80 MG 80 MG: 80 TABLET ORAL at 18:32

## 2017-02-18 RX ADMIN — SIMETHICONE CHEW TAB 80 MG 80 MG: 80 TABLET ORAL at 05:23

## 2017-02-18 RX ADMIN — FERROUS SULFATE TAB 325 MG (65 MG ELEMENTAL FE) 325 MG: 325 (65 FE) TAB at 18:32

## 2017-02-18 RX ADMIN — CYANOCOBALAMIN TAB 1000 MCG 1000 MCG: 1000 TAB at 10:50

## 2017-02-18 RX ADMIN — OXYBUTYNIN CHLORIDE 5 MG: 5 TABLET ORAL at 10:50

## 2017-02-18 RX ADMIN — DOCUSATE SODIUM 100 MG: 100 CAPSULE, LIQUID FILLED ORAL at 18:32

## 2017-02-18 RX ADMIN — ASPIRIN 81 MG 81 MG: 81 TABLET ORAL at 10:49

## 2017-02-18 RX ADMIN — FUROSEMIDE 20 MG: 20 TABLET ORAL at 18:33

## 2017-02-18 RX ADMIN — PANTOPRAZOLE SODIUM 40 MG: 40 TABLET, DELAYED RELEASE ORAL at 05:23

## 2017-02-18 RX ADMIN — Medication 5 ML: at 21:42

## 2017-02-18 RX ADMIN — DOCUSATE SODIUM 100 MG: 100 CAPSULE, LIQUID FILLED ORAL at 21:42

## 2017-02-18 RX ADMIN — LACTOBACILLUS TAB 2 TABLET: TAB at 10:50

## 2017-02-18 RX ADMIN — LACTOBACILLUS TAB 2 TABLET: TAB at 18:32

## 2017-02-18 RX ADMIN — OXYBUTYNIN CHLORIDE 5 MG: 5 TABLET ORAL at 21:42

## 2017-02-18 RX ADMIN — AMPICILLIN SODIUM AND SULBACTAM SODIUM 3 G: 2; 1 INJECTION, POWDER, FOR SOLUTION INTRAMUSCULAR; INTRAVENOUS at 10:45

## 2017-02-18 RX ADMIN — Medication 3000 UNITS: at 10:50

## 2017-02-18 RX ADMIN — SERTRALINE HYDROCHLORIDE 100 MG: 100 TABLET, FILM COATED ORAL at 10:50

## 2017-02-18 RX ADMIN — ROPINIROLE 0.5 MG: 0.5 TABLET, FILM COATED ORAL at 21:42

## 2017-02-18 RX ADMIN — AMPICILLIN SODIUM AND SULBACTAM SODIUM 3 G: 2; 1 INJECTION, POWDER, FOR SOLUTION INTRAMUSCULAR; INTRAVENOUS at 01:58

## 2017-02-18 RX ADMIN — DOCUSATE SODIUM 100 MG: 100 CAPSULE, LIQUID FILLED ORAL at 10:50

## 2017-02-18 RX ADMIN — SIMETHICONE CHEW TAB 80 MG 80 MG: 80 TABLET ORAL at 10:49

## 2017-02-18 RX ADMIN — Medication 5 ML: at 05:24

## 2017-02-18 RX ADMIN — CARVEDILOL 3.12 MG: 3.12 TABLET, FILM COATED ORAL at 18:32

## 2017-02-18 RX ADMIN — FERROUS SULFATE TAB 325 MG (65 MG ELEMENTAL FE) 325 MG: 325 (65 FE) TAB at 10:50

## 2017-02-18 NOTE — PROGRESS NOTES
Hospitalist Progress Note     Admit Date:  2017  4:20 PM   Name:  Jessenia Iyer   Age:  80 y.o.  :  1932   MRN:  [id number]   PCP:  Priscilla Rogers MD  Treatment Team: Attending Provider: Marilee Estrada MD; Utilization Review: Titi Mccullough RN    Subjective:   Patient is an 81 y/o F who was admitted for UTI-related encephalopathy and failing outpatient treatment with macrobid. Met sepsis criteria on admission.  - pt feeling better. She is tired but no other complaints. No CP, SOB, cough. Objective:     Patient Vitals for the past 24 hrs:   Temp Pulse Resp BP SpO2   17 1026 97.6 °F (36.4 °C) 76 16 127/70 100 %   17 0704 97.7 °F (36.5 °C) 75 16 136/76 96 %   17 0417 98.2 °F (36.8 °C) 70 14 119/65 92 %   17 2333 - - - - 91 %   17 2329 100 °F (37.8 °C) 86 14 99/49 (!) 80 %   17 - - - 154/76 98 %   17 1905 - - - 161/85 91 %   17 1844 - 86 18 137/80 96 %   17 1744 - 88 18 138/90 96 %   17 1622 (!) 100.7 °F (38.2 °C) 87 18 159/74 93 %     Oxygen Therapy  O2 Sat (%): 100 % (17 1026)  Pulse via Oximetry: 96 beats per minute (175)  O2 Device: Nasal cannula (17 2333)  No intake or output data in the 24 hours ending 17 1102    General:    Well nourished. Alert. CV:   RRR. No murmur, rub, or gallop. Lungs:   Clear to auscultation bilaterally. No wheezing, rhonchi, or rales. Abdomen:   Soft, nontender, nondistended. Bowel sounds normal.   Extremities: Warm and dry. No cyanosis or edema. Skin:     No rashes or jaundice.      Current Meds:  Current Facility-Administered Medications   Medication Dose Route Frequency    furosemide (LASIX) tablet 20 mg  20 mg Oral DAILY    potassium chloride (K-DUR, KLOR-CON) tablet 10 mEq  10 mEq Oral DAILY    carvedilol (COREG) tablet 3.125 mg  3.125 mg Oral BID WITH MEALS    cefTRIAXone (ROCEPHIN) 1 g in 0.9% sodium chloride (MBP/ADV) 50 mL 1 g IntraVENous Q24H    zolpidem (AMBIEN) tablet 5 mg  5 mg Oral QHS PRN    traMADol (ULTRAM) tablet 50 mg  50 mg Oral Q4H PRN    ondansetron (ZOFRAN) injection 4 mg  4 mg IntraVENous Q6H PRN    guaiFENesin-dextromethorphan (ROBITUSSIN DM) 100-10 mg/5 mL syrup 10 mL  10 mL Oral Q6H PRN    senna-docusate (PERICOLACE) 8.6-50 mg per tablet 2 Tab  2 Tab Oral DAILY PRN    diphenhydrAMINE (BENADRYL) injection 12.5 mg  12.5 mg IntraVENous Q4H PRN    bisacodyl (DULCOLAX) tablet 5 mg  5 mg Oral DAILY PRN    acetaminophen (TYLENOL) tablet 500 mg  500 mg Oral Q6H PRN    aspirin chewable tablet 81 mg  81 mg Oral DAILY    Lactobacillus Acidoph & Bulgar (FLORANEX) tablet 2 Tab  2 Tab Oral BID    cyanocobalamin tablet 1,000 mcg  1,000 mcg Oral DAILY    docusate sodium (COLACE) capsule 100 mg  100 mg Oral TID    lactase (LACTAID) tablet 3,000 Units  1 Tab Oral TID WITH MEALS    ferrous sulfate tablet 325 mg  325 mg Oral BID WITH MEALS    oxybutynin (DITROPAN) tablet 5 mg  5 mg Oral BID    pantoprazole (PROTONIX) tablet 40 mg  40 mg Oral ACB&D    rOPINIRole (REQUIP) tablet 0.5 mg  0.5 mg Oral QHS    rosuvastatin (CRESTOR) tablet 5 mg  5 mg Oral Q MON, WED & FRI    sertraline (ZOLOFT) tablet 100 mg  100 mg Oral DAILY    simethicone (MYLICON) tablet 80 mg  80 mg Oral TIDAC    sodium chloride (NS) flush 5 mL  5 mL InterCATHeter Q8H    sodium chloride (NS) flush 5-10 mL  5-10 mL InterCATHeter PRN    heparin (porcine) injection 5,000 Units  5,000 Units SubCUTAneous Q8H       Labs and Studies:  I have reviewed all labs, meds, telemetry events, and studies from the last 24 hours.   Recent Results (from the past 24 hour(s))   CBC WITH AUTOMATED DIFF    Collection Time: 02/17/17  4:30 PM   Result Value Ref Range    WBC 16.8 (H) 4.3 - 11.1 K/uL    RBC 3.38 (L) 4.05 - 5.25 M/uL    HGB 10.3 (L) 11.7 - 15.4 g/dL    HCT 31.6 (L) 35.8 - 46.3 %    MCV 93.5 79.6 - 97.8 FL    MCH 30.5 26.1 - 32.9 PG    MCHC 32.6 31.4 - 35.0 g/dL RDW 12.3 11.9 - 14.6 %    PLATELET 483 880 - 685 K/uL    MPV 9.9 (L) 10.8 - 14.1 FL    DF AUTOMATED      NEUTROPHILS 91 (H) 43 - 78 %    LYMPHOCYTES 3 (L) 13 - 44 %    MONOCYTES 3 (L) 4.0 - 12.0 %    EOSINOPHILS 3 0.5 - 7.8 %    BASOPHILS 0 0.0 - 2.0 %    IMMATURE GRANULOCYTES 0.3 0.0 - 5.0 %    ABS. NEUTROPHILS 15.2 (H) 1.7 - 8.2 K/UL    ABS. LYMPHOCYTES 0.5 0.5 - 4.6 K/UL    ABS. MONOCYTES 0.5 0.1 - 1.3 K/UL    ABS. EOSINOPHILS 0.5 0.0 - 0.8 K/UL    ABS. BASOPHILS 0.0 0.0 - 0.2 K/UL    ABS. IMM. GRANS. 0.1 0.0 - 0.5 K/UL   METABOLIC PANEL, COMPREHENSIVE    Collection Time: 02/17/17  4:30 PM   Result Value Ref Range    Sodium 133 (L) 136 - 145 mmol/L    Potassium 4.5 3.5 - 5.1 mmol/L    Chloride 96 (L) 98 - 107 mmol/L    CO2 27 21 - 32 mmol/L    Anion gap 10 7 - 16 mmol/L    Glucose 116 (H) 65 - 100 mg/dL    BUN 26 (H) 8 - 23 MG/DL    Creatinine 1.30 (H) 0.6 - 1.0 MG/DL    GFR est AA 50 (L) >60 ml/min/1.73m2    GFR est non-AA 41 (L) >60 ml/min/1.73m2    Calcium 9.1 8.3 - 10.4 MG/DL    Bilirubin, total 0.4 0.2 - 1.1 MG/DL    ALT (SGPT) 20 12 - 65 U/L    AST (SGOT) 26 15 - 37 U/L    Alk.  phosphatase 114 50 - 136 U/L    Protein, total 7.1 6.3 - 8.2 g/dL    Albumin 3.1 (L) 3.2 - 4.6 g/dL    Globulin 4.0 (H) 2.3 - 3.5 g/dL    A-G Ratio 0.8 (L) 1.2 - 3.5     TROPONIN I    Collection Time: 02/17/17  4:30 PM   Result Value Ref Range    Troponin-I, Qt. 0.07 (H) 0.02 - 0.05 NG/ML   PROCALCITONIN    Collection Time: 02/17/17  4:30 PM   Result Value Ref Range    Procalcitonin 13.0 ng/mL   EKG, 12 LEAD, INITIAL    Collection Time: 02/17/17  4:30 PM   Result Value Ref Range    Systolic BP  mmHg    Diastolic BP  mmHg    Ventricular Rate 92 BPM    Atrial Rate 94 BPM    P-R Interval 170 ms    QRS Duration 82 ms    Q-T Interval 324 ms    QTC Calculation (Bezet) 400 ms    Calculated P Axis 56 degrees    Calculated R Axis 99 degrees    Calculated T Axis 108 degrees    Diagnosis       !! AGE AND GENDER SPECIFIC ECG ANALYSIS !!  Normal sinus rhythm  Lateral infarct , age undetermined  Abnormal ECG  When compared with ECG of 01-DEC-2016 19:17,  Lateral infarct is now Present     POC LACTIC ACID    Collection Time: 02/17/17  4:48 PM   Result Value Ref Range    Lactic Acid (POC) 1.1 0.5 - 1.9 mmol/L   CULTURE, BLOOD    Collection Time: 02/17/17  5:10 PM   Result Value Ref Range    Special Requests: RIGHT HAND      Culture result: NO GROWTH AFTER 11 HOURS     CULTURE, BLOOD    Collection Time: 02/17/17  5:20 PM   Result Value Ref Range    Special Requests: RIGHT HAND      Culture result: NO GROWTH AFTER 11 HOURS     URINE MICROSCOPIC    Collection Time: 02/17/17  5:50 PM   Result Value Ref Range    WBC 3-5 0 /hpf    RBC 0-3 0 /hpf    Epithelial cells 0-3 0 /hpf    Bacteria 4+ (H) 0 /hpf    Casts 0 0 /lpf    Crystals 0 0 /LPF    Amorphous Crystals 1+ (H) 0    Mucus 0 0 /lpf   CULTURE, URINE    Collection Time: 02/17/17  5:50 PM   Result Value Ref Range    Special Requests: NO SPECIAL REQUESTS      Culture result:        NO GROWTH AFTER SHORT PERIOD OF INCUBATION. FURTHER RESULTS TO FOLLOW AFTER OVERNIGHT INCUBATION. BLOOD GAS, ARTERIAL    Collection Time: 02/17/17  8:00 PM   Result Value Ref Range    pH 7.43 7.35 - 7.45      PCO2 31 (L) 35.0 - 45.0 mmHg    PO2 78 75.0 - 100.0 mmHg    BICARBONATE 20 (L) 22.0 - 26.0 mmol/L    BASE DEFICIT 3.4 (H) 0 - 2 mmol/L    TOTAL HEMOGLOBIN 9.9 (L) 11.7 - 15.0 GM/DL    O2 SAT 95 92.0 - 98.5 %    ARTERIAL O2 HGB 94.6 94.0 - 97.0 %    CARBOXYHEMOGLOBIN 0.3 (L) 0.5 - 1.5 %    METHEMOGLOBIN 0.4 0.0 - 1.5 %    DEOXYHEMOGLOBIN 5 0.0 - 5.0 %    SITE RR     ALLENS TEST POSITIVE      MODE NC     FIO2 30.0 %    O2 FLOW 2.50 L/min    Respiratory comment: EBER Bailey at 2 17 2017 8 02 40 PM. Read back.     CBC WITH AUTOMATED DIFF    Collection Time: 02/18/17  6:00 AM   Result Value Ref Range    WBC 10.9 4.3 - 11.1 K/uL    RBC 2.95 (L) 4.05 - 5.25 M/uL    HGB 8.9 (L) 11.7 - 15.4 g/dL    HCT 27.7 (L) 35.8 - 46.3 %    MCV 93.9 79.6 - 97.8 FL    MCH 30.2 26.1 - 32.9 PG    MCHC 32.1 31.4 - 35.0 g/dL    RDW 12.5 11.9 - 14.6 %    PLATELET 742 680 - 141 K/uL    MPV 9.8 (L) 10.8 - 14.1 FL    DF AUTOMATED      NEUTROPHILS 81 (H) 43 - 78 %    LYMPHOCYTES 9 (L) 13 - 44 %    MONOCYTES 5 4.0 - 12.0 %    EOSINOPHILS 5 0.5 - 7.8 %    BASOPHILS 0 0.0 - 2.0 %    IMMATURE GRANULOCYTES 0.2 0.0 - 5.0 %    ABS. NEUTROPHILS 8.7 (H) 1.7 - 8.2 K/UL    ABS. LYMPHOCYTES 1.0 0.5 - 4.6 K/UL    ABS. MONOCYTES 0.6 0.1 - 1.3 K/UL    ABS. EOSINOPHILS 0.5 0.0 - 0.8 K/UL    ABS. BASOPHILS 0.0 0.0 - 0.2 K/UL    ABS. IMM. GRANS. 0.0 0.0 - 0.5 K/UL   METABOLIC PANEL, BASIC    Collection Time: 02/18/17  6:00 AM   Result Value Ref Range    Sodium 138 136 - 145 mmol/L    Potassium 3.8 3.5 - 5.1 mmol/L    Chloride 104 98 - 107 mmol/L    CO2 26 21 - 32 mmol/L    Anion gap 8 7 - 16 mmol/L    Glucose 78 65 - 100 mg/dL    BUN 22 8 - 23 MG/DL    Creatinine 1.09 (H) 0.6 - 1.0 MG/DL    GFR est AA >60 >60 ml/min/1.73m2    GFR est non-AA 51 (L) >60 ml/min/1.73m2    Calcium 8.7 8.3 - 10.4 MG/DL        All Micro Results     Procedure Component Value Units Date/Time    CULTURE, URINE [[id number]] Collected:  02/17/17 1750    Order Status:  Completed Specimen:  Clean catch Updated:  02/18/17 0862     Special Requests: NO SPECIAL REQUESTS        Culture result:         NO GROWTH AFTER SHORT PERIOD OF INCUBATION. FURTHER RESULTS TO FOLLOW AFTER OVERNIGHT INCUBATION.     CULTURE, BLOOD [[id number]] Collected:  02/17/17 1710    Order Status:  Completed Specimen:  Blood from Blood Updated:  02/18/17 0611     Special Requests: RIGHT HAND        Culture result: NO GROWTH AFTER 11 HOURS       CULTURE, BLOOD [[id number]] Collected:  02/17/17 1720    Order Status:  Completed Specimen:  Blood from Blood Updated:  02/18/17 0611     Special Requests: RIGHT HAND        Culture result: NO GROWTH AFTER 11 HOURS             Recent Imaging:  CXR Results  (Last 48 hours)               02/18/17 0835  XR CHEST PA LAT Final result    Impression:  IMPRESSION:   Suspect mild volume overload. Narrative:  Frontal and lateral views of the chest        Comparison: 2/17/2017       Indication: Hypoxia, history of sepsis       FINDINGS: Mild cardiac enlargement. Increased congestion of the lower lobes. Small effusions. No pneumothorax or lobar consolidation. No discrete acute   osseous lesion seen. 02/17/17 1707  XR CHEST PA LAT Final result    Impression:  IMPRESSION:   1. Mild interstitial prominence at the lung bases could represent underlying   chronic change versus interstitial edema. 2. Hiatal hernia. Narrative:  Two view chest:  02/17/2017       History: weakness. Comparison: 12/01/2016       Findings: The heart is normal in size. There is a large hiatal hernia. There is   mild interstitial prominence at the lung bases. There are no pleural effusions. There is diffuse osteopenia. Remote compression fracture is present at the   thoracolumbar junction. A vascular stent overlies the upper abdomen posteriorly.                CT Results  (Last 48 hours)    None          Assessment and Plan:     Hospital Problems as of 2/18/2017  Date Reviewed: 7/28/2016          Codes Class Noted - Resolved POA    Chronic combined systolic and diastolic congestive heart failure (HCC) (Chronic) ICD-10-CM: I50.42  ICD-9-CM: 428.42, 428.0  2/18/2017 - Present Yes        * (Principal)Acute cystitis without hematuria ICD-10-CM: N30.00  ICD-9-CM: 595.0  2/18/2017 - Present Yes        DNR (do not resuscitate) (Chronic) ICD-10-CM: T55  ICD-9-CM: V49.86  2/18/2017 - Present Yes        Encephalopathy due to infection ICD-10-CM: G93.49, B99.9  ICD-9-CM: 348.39, 136.9  2/17/2017 - Present Yes        Lewy body dementia (Chronic) ICD-10-CM: G31.83, F02.80  ICD-9-CM: 331.82  2/17/2017 - Present Yes        CKD (chronic kidney disease) stage 3, GFR 30-59 ml/min (Chronic) ICD-10-CM: N18.3  ICD-9-CM: 585.3 2/17/2017 - Present Yes        Diastolic dysfunction (Chronic) ICD-10-CM: I51.9  ICD-9-CM: 429.9  2/17/2017 - Present Yes        Coronary atherosclerosis of native coronary artery (Chronic) ICD-10-CM: I25.10  ICD-9-CM: 414.01  3/16/2013 - Present Yes        Essential hypertension, benign (Chronic) ICD-10-CM: I10  ICD-9-CM: 401.1  8/30/2012 - Present Yes        Dyslipidemia (Chronic) ICD-10-CM: E78.5  ICD-9-CM: 272.4  8/30/2012 - Present Yes        Spinal stenosis (Chronic) ICD-10-CM: M48.00  ICD-9-CM: 724.00  8/30/2012 - Present Yes        Chronic pain- chronic narcotic use for spinal stenosis (Chronic) ICD-10-CM: R02.97  ICD-9-CM: 338.29  8/30/2012 - Present Yes        RESOLVED: Hypoxia ICD-10-CM: R09.02  ICD-9-CM: 799.02  2/17/2017 - 2/18/2017 Yes        RESOLVED: Hypotension ICD-10-CM: I95.9  ICD-9-CM: 458.9  2/17/2017 - 2/18/2017 Yes        RESOLVED: Sepsis due to Escherichia coli [facility]) ICD-10-CM: A41.51  ICD-9-CM: 038.42, 995.91  2/12/2014 - 2/18/2017 Yes                PLAN:    · Only nidus of infection seen is urine, switch abx to rocephin. Presumed e coli. History of same in past.  Monitor urine culture. WBC and temp now WNL. · Resume home coreg and lasix for CHF. Mild overload on CXR    DC planning:  Plan to discharge home when ready, possibly tomorrow. pt is wheelchair bound and family desires to take her home.   DVT ppx:  heparin  Discussed plan with:  pt    Signed:  Emma Patel MD

## 2017-02-18 NOTE — PROGRESS NOTES
Problem: Dysphagia (Adult)  Goal: *Speech Goal: (INSERT TEXT)  SPEECH LANGUAGE PATHOLOGY: BEDSIDE SWALLOW NOTE: DAILY NOTE AND DISCHARGE     NAME/AGE/GENDER: Carrie Wills is a 80 y.o. female  DATE: 2/18/2017  PRIMARY DIAGNOSIS: Sepsis ([facility] [address]       ICD-10: Treatment Diagnosis: R13.19 Dysphagia, other  INTERDISCIPLINARY COLLABORATION: Registered Nurse  PRECAUTIONS/ALLERGIES: Morphine; Other medication; and Sulfa (sulfonamide antibiotics)   ASSESSMENT:   Based on the objective data described below, Ms. April Pitt presents with an oropharyngeal swallow within functional limits. Patient demonstrates no overt signs/symptoms of aspiration and a clear voice following each presented PO trial of thin by cup and straw, puree, mixed, and regular consistencies. As patient was being repositioned to lay back in bed, she demonstrated x1 delayed cough and throat clear, well after the swallow of all PO. SLP feels this was not related to swallow, and more likely related to her history of hiatal hernia and GERD. Recommend continue current diet of regular/thin liquids. No further ST is indicated at this time. ?????? ? ? This section established at most recent assessment??????????  PROBLEM LIST (Impairments causing functional limitations):  1. Dysphagia  REHABILITATION POTENTIAL FOR STATED GOALS: N/A      PLAN OF CARE:   Patient will benefit from skilled intervention to address the following impairments. RECOMMENDATIONS AND PLANNED INTERVENTIONS (Benefits and precautions of therapy have been discussed with the patient.):  · PO:  Regular  · Liquids:  regular thin  MEDICATIONS:  · With liquid  COMPENSATORY STRATEGIES/MODIFICATIONS INCLUDING:  · None  OTHER RECOMMENDATIONS (including follow up treatment recommendations):   · GERD precautions  RECOMMENDED DIET MODIFICATIONS DISCUSSED WITH:  · Nursing  · Patient  FREQUENCY/DURATION: Patient to be discharged from Speech Therapy services this date.        RECOMMENDED REHABILITATION/EQUIPMENT: (at time of discharge pending progress):   None. SUBJECTIVE:   \"I would really like to go to sleep. \"  History of Present Injury/Illness: Ms. Tara Tirado  has a past medical history of Acute myocardial infarction of other anterior wall, initial episode of care [facility]) (8/30/2012); Acute systolic congestive heart failure- in setting of anterior MI, EF 35-40% (8/30/2012); Acute systolic heart failure ([address] ARF (acute renal failure) ([address] (9/29/2012); Arthritis; CAD (coronary artery disease); Carotid artery stenosis without cerebral infarction; Chronic pain- chronic narcotic use for spinal stenosis (8/30/2012); Coronary atherosclerosis of native coronary artery (3/16/2013); Diarrhea (10/2/2012); Dyslipidemia (8/30/2012); Dyspnea; Essential hypertension, benign (8/30/2012); Heart failure ([facility] [address] Hyperlipidemia; Hypertension; Ischemic colitis ([address] Lower GI bleed (3/12/2015); Myocardial infarction, anterolateral wall, subsequent care ([address] N&V (nausea and vomiting) (3/16/2013); Sepsis ([facility] [address] (2/12/2014); Spinal stenosis (8/30/2012); ST elevation (STEMI) myocardial infarction involving left anterior descending coronary artery ([facility] [address] and Syncope and collapse (2/12/2014). She also has no past medical history of Unspecified sleep apnea. .  She also  has a past surgical history that includes cholecystectomy; gyn; appendectomy; orthopaedic; other surgical; coronary stent placement; and cardiac surg procedure unlist.   Present Symptoms:    Pain Intensity 1: 0  Pain Location 1: Shoulder  Pain Orientation 1: Right  Pain Intervention(s) 1: Medication (see MAR)  Current Medications:   No current facility-administered medications on file prior to encounter. Current Outpatient Prescriptions on File Prior to Encounter   Medication Sig Dispense Refill    oxybutynin (DITROPAN) 5 mg tablet Take 5 mg by mouth two (2) times a day.         ferrous sulfate (IRON) 325 mg (65 mg iron) EC tablet Take 1 Tab by mouth two (2) times a day. 60 Tab 0    aspirin 81 mg chewable tablet Take 1 Tab by mouth daily. Resume Aspirin on March 3rd (  Will complete 7 days off   ASA ) 30 Tab 0    carvedilol (COREG) 3.125 mg tablet Take 1 Tab by mouth two (2) times daily (with meals). (Patient taking differently: Take 6.25 mg by mouth two (2) times daily (with meals). Indications: Chronic Heart Failure, hypertension) 60 Tab 0    lisinopril (PRINIVIL, ZESTRIL) 20 mg tablet Take 1.5 Tabs by mouth daily. (Patient taking differently: Take 10 mg by mouth daily. Taking 10 mg daily  Indications: HYPERTENSION) 45 Tab 0    rOPINIRole (REQUIP) 5 mg tablet Take 0.5 mg by mouth nightly.  ondansetron (ZOFRAN ODT) 4 mg disintegrating tablet Take 1 Tab by mouth every eight (8) hours as needed for Nausea. (Patient taking differently: Take 8 mg by mouth every eight (8) hours as needed for Nausea.) 10 Tab 0    nitroglycerin (NITROSTAT) 0.4 mg SL tablet 1 Tab by SubLINGual route every five (5) minutes as needed for Chest Pain. 1 Bottle 3    FAMOTIDINE/CA CARB/MAG HYDROX (PEPCID COMPLETE PO) Take  by mouth.  furosemide (LASIX) 20 mg tablet Take 1 Tab by mouth daily as needed. 15 Tab 5    potassium chloride (K-DUR, KLOR-CON) 10 mEq tablet Take 1 Tab by mouth daily as needed. Only take this med on days when she  takes Lasix the two go together 15 Tab 5    HYDROcodone-acetaminophen (NORCO)  mg tablet Take 1 Tab by mouth every six (6) hours as needed for Pain. Max Daily Amount: 4 Tabs. 20 Tab 0    traMADol (ULTRAM) 50 mg tablet Take 1 Tab by mouth every six (6) hours as needed. Max Daily Amount: 200 mg. 90 Tab 0    oxyCODONE IR (ROXICODONE) 5 mg immediate release tablet Take 1 Tab by mouth every four (4) hours as needed for Pain. Max Daily Amount: 30 mg. (Patient taking differently: Take 5 mg by mouth every four (4) hours as needed for Pain.  Takes 10mg in am, 5mg mid day, 10mg at HS, and 5mg if needed during the night.) 90 Tab 0    lactase (LACTAID) 3,000 unit tablet Take 1 Tab by mouth three (3) times daily (with meals). Take if patient will eat dairy products 90 Tab 0    simethicone (MYLICON) 80 mg chewable tablet Take 1 Tab by mouth Before breakfast, lunch, and dinner. 90 Tab 0    pantoprazole (PROTONIX) 40 mg tablet Take 1 Tab by mouth two (2) times a day. 60 Tab 6    sertraline (ZOLOFT) 100 mg tablet Take 100 mg by mouth daily. Current Dietary Status:  Reg/Thin           History of reflux:  YES   · Reflux medication:Protonix  Social History/Home Situation:    Home Environment: Private residence  One/Two Story Residence: One story  Living Alone: No  Support Systems: Family member(s)  Patient Expects to be Discharged to[de-identified] Private residence  Current DME Used/Available at Home: Wheelchair, Benoitnne Aline      OBJECTIVE:   Respiratory Status:  Nasal cannula     CXR Results:IMPRESSION:  1. Mild interstitial prominence at the lung bases could represent underlying  chronic change versus interstitial edema. 2. Hiatal hernia. MRI/CT Results:n/a  Oral Motor Structure/Speech:  Oral-Motor Structure/Motor Speech  Labial: No impairment  Dentition: Partials (comment), Intact, Natural  Oral Hygiene: adequate  Lingual: No impairment  Velum: Unable to visualize  Mandible: No impairment     Cognitive and Communication Status:  Neurologic State: Alert; Appropriate for age  Orientation Level: Oriented to person;Oriented to place;Oriented to situation  Cognition: Appropriate for age attention/concentration; Follows commands  Perception: Appears intact  Perseveration: No perseveration noted  Safety/Judgement: Awareness of environment     BEDSIDE SWALLOW EVALUATION  Oral Assessment:  Oral Assessment  Labial: No impairment  Dentition: Partials (comment); Intact; Natural  Oral Hygiene: adequate  Lingual: No impairment  Velum: Unable to visualize  Mandible: No impairment  P.O.  Trials:  Patient Position: upright in bed     The patient was given teaspoon to tablespoon amounts of the following:   Consistency Presented: Thin liquid;Puree;Mixed consistency; Solid  How Presented: Self-fed/presented;SLP-fed/presented;Cup/sip;Spoon;Straw;Successive swallows     ORAL PHASE:  Bolus Acceptance: No impairment  Bolus Formation/Control: No impairment  Propulsion: No impairment     Oral Residue: None     PHARYNGEAL PHASE:  Initiation of Swallow: No impairment  Laryngeal Elevation: Functional  Aspiration Signs/Symptoms: Delayed cough/throat clear  Vocal Quality: No impairment           Pharyngeal Phase Characteristics: No impairment, issues, or problems      OTHER OBSERVATIONS:  Rate/bite size: WNL         Endurance: WNL                  Tool Used: Dysphagia Outcome and Severity Scale (MANI)     Score Comments   Normal Diet  [ ] 7 With no strategies or extra time needed   Functional Swallow  [X] 6 May have mild oral or pharyngeal delay         Mild Dysphagia     [ ] 5 Which may require one diet consistency restricted (those who demonstrate penetration which is entirely cleared on MBS would be included)   Mild-Moderate Dysphagia  [ ] 4 With 1-2 diet consistencies restricted         Moderate Dysphagia  [ ] 3 With 2 or more diet consistencies restricted         Moderately Severe Dysphagia  [ ] 2 With partial PO strategies (trials with ST only)         Severe Dysphagia  [ ] 1 With inability to tolerate any PO safely            Score:  Initial:6 Most Recent: X (Date: -- )   Interpretation of Tool: The Dysphagia Outcome and Severity Scale (MANI) is a simple, easy-to-use, 7-point scale developed to systematically rate the functional severity of dysphagia based on objective assessment and make recommendations for diet level, independence level, and type of nutrition.        Score 7 6 5 4 3 2 1   Modifier CH CI CJ CK CL CM CN   · Swallowing:               - CURRENT STATUS:           CI - 1%-19% impaired, limited or restricted               - GOAL STATUS:                   CI - 1%-19% impaired, limited or restricted               - D/C STATUS:                       CI - 1%-19% impaired, limited or restricted  [place] / Product Type: Medicare /       TREATMENT:         (In addition to Assessment/Re-Assessment sessions the following treatments were rendered)  Assessment/Reassessment only, no treatment provided today  MODALITIES:                                                                     ORAL MOTOR  EXERCISES:                                                                                                                                                                       LARYNGEAL / PHARYNGEAL EXERCISES:                                                                                                                                      __________________________________________________________________________________________________  Safety:   After treatment position/precautions:  · Call light within reach  · RN notified  · Side rails x 3  · Upright in Bed  Treatment Assessment:  Oropharyngeal swallow WFL. Delayed cough followed by throat clear as patient was laying back in bed, likely related to GERD/Hiatial Hernia History.    Total Treatment Duration:  Time In: 0941  Time Out: [address], DARRELL, CCC-SLP

## 2017-02-18 NOTE — PROGRESS NOTES
Pt found choking on her hamburger steak, she passed previous swallow eval with no problems. Pt's legs propped up in her chair,legs lowered and pt understands that her legs must be lowered while eating. (Pillows already propped up behind pt). Lungs are coarse.      notified and they are bringing up ground meat and mashed potatoes for the remainder of her meal.

## 2017-02-18 NOTE — ROUTINE PROCESS
TRANSFER - OUT REPORT:    Verbal report given to [employer] (name) on [facility] Or  being transferred to [facility](unit) for progression of care. Report consisted of patients Situation, Background, Assessment and   Recommendations(SBAR). Information from the following report(s) ER summary was reviewed with the receiving nurse. Opportunity for questions and clarification was provided.       Patient transported with:   Hospital Transport

## 2017-02-18 NOTE — PROGRESS NOTES
Pt's MAR has certain meds that she no longer takes, per pt and daughter-in-law    She also takes oxycodone for severe back pain/scoliosis that has been d/c'd. Spoke with Dr. Jim Vizcaino, medication changes made.

## 2017-02-18 NOTE — PROGRESS NOTES
Problem: Mobility Impaired (Adult and Pediatric)  Goal: *Acute Goals and Plan of Care (Insert Text)  LTG:  (1.)Ms. Ethan James will move from supine to sit and sit to supine , scoot up and down and roll side to side in bed with MODIFIED INDEPENDENCE within 7 day(s). (2.)Ms. Ethan James will transfer from bed to chair and chair to bed with MODIFIED INDEPENDENCE using the least restrictive device within 7 day(s). (3.)Ms. Ethan James will ambulate with MODIFIED INDEPENDENCE for 100 feet with the least restrictive device within 7 day(s). ________________________________________________________________________________________________      PHYSICAL THERAPY: INITIAL ASSESSMENT, AM 2/18/2017  INPATIENT: Hospital Day: 2  [place] / Product Type: Medicare /      NAME/AGE/GENDER: Chiquis Baxter is a 80 y.o. female           PRIMARY DIAGNOSIS: Sepsis ([facility] [address] Acute cystitis without hematuria Acute cystitis without hematuria        ICD-10: Treatment Diagnosis:       · Generalized Muscle Weakness (M62.81)  · Other lack of cordination (R27.8)  · Difficulty in walking, Not elsewhere classified (R26.2)  · Other abnormalities of gait and mobility (R26.89)   Precaution/Allergies:  Morphine; Other medication; and Sulfa (sulfonamide antibiotics)       ASSESSMENT:      Ms. Ethan James is an 81 YO F admitted to the hospital with nausea, vomiting, increased confusion, and hypotension. Pt is supine in bed upon arrival and agreeable to PT assessment. No family present at bedside. Pt states she lives with daughter in law and has caregivers and home health PT/OT. Pt is alone during the day while DIL works. States she uses WC for all mobility and transfers herself bed<>WC. Only walks with home health PT with RW. Pt is able to perform bed mobility with stand-by assist and requires occasional support sitting EOB for posterior lean. Able to stand and transfer to chair with CGA.  C/O some chronic L knee pain with mobility. All needs met and encouraged pt to sit up for lunch. Pt appears to be functioning close to baseline and will benefit from skilled physical therapy during acute care stay as well as home health PT at discharge to improve safety and efficiency with transfers and mobility. This section established at most recent assessment   PROBLEM LIST (Impairments causing functional limitations):  1. Decreased Strength  2. Decreased ADL/Functional Activities  3. Decreased Transfer Abilities  4. Decreased Ambulation Ability/Technique  5. Decreased Balance  6. Increased Pain  7. Decreased Activity Tolerance    INTERVENTIONS PLANNED: (Benefits and precautions of physical therapy have been discussed with the patient.)  1. Balance Exercise  2. Bed Mobility  3. Family Education  4. Gait Training  5. Therapeutic Activites  6. Therapeutic Exercise/Strengthening  7. Transfer Training  8. Group Therapy      TREATMENT PLAN: Frequency/Duration: 3 times a week for duration of hospital stay  Rehabilitation Potential For Stated Goals: GOOD      RECOMMENDED REHABILITATION/EQUIPMENT: (at time of discharge pending progress): Continue Skilled Therapy and Home Health: Physical Therapy. HISTORY:   History of Present Injury/Illness (Reason for Referral):  Per H&P, \"Veronica Goff is a 80 y.o. female that presented to the ED with a 1 week history of dysuria and was started on empiric treatment with Macrobid as OP. She reports dysuria has improved but has 2 day history of fever, chills, confusion and N/V. She was seen by her PCP today and noted to have BP of 60/47 and subsequently sent to the ED. She denies difficulty breathing. She complains of arthritis pain in her left knee and hips, no other pain. No diarrhea. In the ED, UA with 3-5 WBC and 4+ bacteria, CXR detailed below. She is hypoxic on room air with O2 sat in the mid 80's, 94% on 3L NC.  She has chronic cough and has been coughing more recently while eating per family. \"  Past Medical History/Comorbidities:   Ms. Joseph Ross  has a past medical history of Acute myocardial infarction of other anterior wall, initial episode of care [facility]) (8/30/2012); Acute systolic congestive heart failure- in setting of anterior MI, EF 35-40% (8/30/2012); Acute systolic heart failure ([address] ARF (acute renal failure) ([address] (9/29/2012); Arthritis; CAD (coronary artery disease); Carotid artery stenosis without cerebral infarction; Chronic pain- chronic narcotic use for spinal stenosis (8/30/2012); Coronary atherosclerosis of native coronary artery (3/16/2013); Diarrhea (10/2/2012); Dyslipidemia (8/30/2012); Dyspnea; Essential hypertension, benign (8/30/2012); Heart failure ([address] Hyperlipidemia; Hypertension; Ischemic colitis ([address] Lower GI bleed (3/12/2015); Myocardial infarction, anterolateral wall, subsequent care ([address] N&V (nausea and vomiting) (3/16/2013); Sepsis ([address] (2/12/2014); Spinal stenosis (8/30/2012); ST elevation (STEMI) myocardial infarction involving left anterior descending coronary artery ([facility] [address] and Syncope and collapse (2/12/2014). She also has no past medical history of Unspecified sleep apnea. Ms. Joseph Ross  has a past surgical history that includes cholecystectomy; gyn; appendectomy; orthopaedic; other surgical; coronary stent placement; and cardiac surg procedure unlist.  Social History/Living Environment:   Home Environment: Private residence  # Steps to Enter: 0  Wheelchair Ramp: Yes  One/Two Story Residence: One story  Living Alone: No  Support Systems: Family member(s)  Patient Expects to be Discharged to[de-identified] Private residence  Current DME Used/Available at Home: Wheelchair, Luellen Canavan, rolling  Prior Level of Function/Work/Activity:  Ms. Joseph Ross reports she primarily used [facility] for mobility and was able to transfer from bed<>WC without assistance. Has caregivers 2x week for bathing and home health PT/OT 3x week. DIL prepares meals.  States she will be going to adult  soon 2x week. Number of Personal Factors/Comorbidities that affect the Plan of Care:  Alone during the day  Chronic L knee pain 1-2: MODERATE COMPLEXITY   EXAMINATION:   Most Recent Physical Functioning:   Gross Assessment:  AROM: Within functional limits  Strength: Generally decreased, functional  Sensation: Intact               Posture:  Posture (WDL): Exceptions to WDL  Posture Assessment: Forward head, Rounded shoulders, Trunk flexion  Balance:  Sitting: Impaired  Sitting - Static: Fair (occasional)  Sitting - Dynamic: Fair (occasional)  Standing: Impaired  Standing - Static: Fair;Occassional  Standing - Dynamic : Fair Bed Mobility:  Rolling: Stand-by asssistance  Supine to Sit: Stand-by asssistance  Scooting: Stand-by asssistance  Wheelchair Mobility:     Transfers:  Sit to Stand: Contact guard assistance  Stand to Sit: Contact guard assistance  Bed to Chair: Contact guard assistance  Gait:     Base of Support: Widened  Speed/Mariel: Slow  Step Length: Left shortened;Right shortened  Distance (ft): 3 Feet (ft)  Ambulation - Level of Assistance: Contact guard assistance       Body Structures Involved:  1. Joints  2. Muscles Body Functions Affected:  1. Sensory/Pain  2. Neuromusculoskeletal  3. Movement Related Activities and Participation Affected:  1. General Tasks and Demands  2. Mobility  3. Self Care  4. Domestic Life   Number of elements that affect the Plan of Care: 4+: HIGH COMPLEXITY   CLINICAL PRESENTATION:   Presentation: Stable and uncomplicated: LOW COMPLEXITY   CLINICAL DECISION MAKIN [facility] [id number] AM-PAC 6 Clicks   Basic Mobility Inpatient Short Form  How much difficulty does the patient currently have. .. Unable A Lot A Little None   1. Turning over in bed (including adjusting bedclothes, sheets and blankets)? [ ] 1   [ ] 2   [ ] 3   [X] 4   2. Sitting down on and standing up from a chair with arms ( e.g., wheelchair, bedside commode, etc.)   [ ] 1   [ ] 2   [X] 3   [ ] 4   3. Moving from lying on back to sitting on the side of the bed? [ ] 1   [ ] 2   [ ] 3   [X] 4   How much help from another person does the patient currently need. .. Total A Lot A Little None   4. Moving to and from a bed to a chair (including a wheelchair)? [ ] 1   [ ] 2   [X] 3   [ ] 4   5. Need to walk in hospital room? [ ] 1   [X] 2   [ ] 3   [ ] 4   6. Climbing 3-5 steps with a railing? [X] 1   [ ] 2   [ ] 3   [ ] 4   © 2007, Trustees of [address] [place] [address] [address] [id number], under license to [employer]. All rights reserved    Score:  Initial: 17 Most Recent: X (Date: 2/18/17)     Interpretation of Tool:  Represents activities that are increasingly more difficult (i.e. Bed mobility, Transfers, Gait). Score 24 23 22-20 19-15 14-10 9-7 6       Modifier CH CI CJ CK CL CM CN         · Mobility - Walking and Moving Around:               - CURRENT STATUS:    CK - 40%-59% impaired, limited or restricted               - GOAL STATUS:           CJ - 20%-39% impaired, limited or restricted               - D/C STATUS:                       ---------------To be determined---------------  [place] / Product Type: Medicare /       Medical Necessity:     · Patient is expected to demonstrate progress in strength, balance and functional technique to improve safety during functional mobility. Reason for Services/Other Comments:  · Patient continues to require present interventions due to patient's inability to complete functional mobility safely and efficiently. Use of outcome tool(s) and clinical judgement create a POC that gives a: Clear prediction of patient's progress: LOW COMPLEXITY                 TREATMENT:   (In addition to Assessment/Re-Assessment sessions the following treatments were rendered)   Pre-treatment Symptoms/Complaints:   \"This left knee hurts a little bit when I move around\"  Pain: Initial:   Pain Intensity 1: 0  Post Session:  0/10 Assessment/Reassessment only, no treatment provided today     Braces/Orthotics/Lines/Etc:   · IV  · O2 Device: Nasal cannula  Treatment/Session Assessment:    · Response to Treatment:  Pt demonstrates decreased activity tolerance and increased L knee pain with mobility  · Interdisciplinary Collaboration:  · Physical Therapist  · Registered Nurse  · After treatment position/precautions:  · Up in chair  · Bed alarm/tab alert on  · Bed/Chair-wheels locked  · Call light within reach  · RN notified  · Compliance with Program/Exercises: Will assess as treatment progresses. · Recommendations/Intent for next treatment session: \"Next visit will focus on advancements to more challenging activities and reduction in assistance provided\".   Total Treatment Duration:  PT Patient Time In/Time Out  Time In: 1053  Time Out: [address]

## 2017-02-18 NOTE — H&P
HOSPITALIST H&P  NAME:  Padma Mckenzie   Age:  80 y.o.  :   1932   MRN:   [id number]  PCP: Jenelle Haywood MD  Treatment Team: Attending Provider: Lupillo Welch MD; Primary Nurse: Maik Mckee; Primary Nurse: Jessenia Craft    HPI:   Padma Mckenzie is a 80 y.o. female that presented to the ED with a 1 week history of dysuria and was started on empiric treatment with Macrobid as OP. She reports dysuria has improved but has 2 day history of fever, chills, confusion and N/V. She was seen by her PCP today and noted to have BP of 60/47 and subsequently sent to the ED. She denies difficulty breathing. She complains of arthritis pain in her left knee and hips, no other pain. No diarrhea. In the ED, UA with 3-5 WBC and 4+ bacteria, CXR detailed below. She is hypoxic on room air with O2 sat in the mid 80's, 94% on 3L NC. She has chronic cough and has been coughing more recently while eating per family. The hospitalist have been have been asked to admit. Results summary of Diagnostic Studies/Procedures copied from within [facility] EMR:  CXR  IMPRESSION:  1. Mild interstitial prominence at the lung bases could represent underlying  chronic change versus interstitial edema. 2. Hiatal hernia.     Complete ROS done and is as stated in HPI or otherwise negative  Past Medical History   Diagnosis Date    Acute myocardial infarction of other anterior wall, initial episode of care [facility]) 2012     MI - Had a stent placed     Acute systolic congestive heart failure- in setting of anterior MI, EF 35-40%     Acute systolic heart failure ([address]      resolved at discharge, EF 35% at time of MI, 55% by echo prior to discharge    ARF (acute renal failure) ([address] 2012    Arthritis     CAD (coronary artery disease)      MI    Carotid artery stenosis without cerebral infarction     Chronic pain- chronic narcotic use for spinal stenosis 2012    Coronary atherosclerosis of native coronary artery 3/16/2013    Diarrhea 10/2/2012    Dyslipidemia 8/30/2012    Dyspnea      unspecified    Essential hypertension, benign 8/30/2012    Heart failure (HCC)      denies heart failure    Hyperlipidemia      other unsp dyslipidemia    Hypertension      controlled, benign    Ischemic colitis [facility]     Lower GI bleed 3/12/2015    Myocardial infarction, anterolateral wall, subsequent care [facility])      s/p 2.5mm Xience stent to focal lesion in mid LAD 8/2012    N&V (nausea and vomiting) 3/16/2013    Sepsis ([facility] 2/12/2014    Spinal stenosis 8/30/2012    ST elevation (STEMI) myocardial infarction involving left anterior descending coronary artery (HCC)     Syncope and collapse 2/12/2014      Past Surgical History   Procedure Laterality Date    Hx cholecystectomy      Hx gyn       hysterectomy    Hx appendectomy      Hx orthopaedic       moreno knees, ankle and spinal cord stimulator    Hx other surgical       cord stimulator for pain control removed    Hx coronary stent placement       2012 for MI    Pr cardiac surg procedure unlist       stent      Prior to Admission Medications   Prescriptions Last Dose Informant Patient Reported? Taking? FAMOTIDINE/CA CARB/MAG HYDROX (PEPCID COMPLETE PO) Not Taking at Unknown time  Yes No   Sig: Take  by mouth. HYDROcodone-acetaminophen (NORCO)  mg tablet Not Taking at Unknown time  No No   Sig: Take 1 Tab by mouth every six (6) hours as needed for Pain. Max Daily Amount: 4 Tabs. aspirin 81 mg chewable tablet   No No   Sig: Take 1 Tab by mouth daily. Resume Aspirin on March 3rd (  Will complete 7 days off   ASA )   carvedilol (COREG) 3.125 mg tablet   No No   Sig: Take 1 Tab by mouth two (2) times daily (with meals). Patient taking differently: Take 6.25 mg by mouth two (2) times daily (with meals).  Indications: Chronic Heart Failure, hypertension   ferrous sulfate (IRON) 325 mg (65 mg iron) EC tablet   No No Sig: Take 1 Tab by mouth two (2) times a day. furosemide (LASIX) 20 mg tablet Not Taking at Unknown time  No No   Sig: Take 1 Tab by mouth daily as needed. lactase (LACTAID) 3,000 unit tablet Not Taking at Unknown time  No No   Sig: Take 1 Tab by mouth three (3) times daily (with meals). Take if patient will eat dairy products   lisinopril (PRINIVIL, ZESTRIL) 20 mg tablet   No No   Sig: Take 1.5 Tabs by mouth daily. Patient taking differently: Take 10 mg by mouth daily. Taking 10 mg daily  Indications: HYPERTENSION   nitroglycerin (NITROSTAT) 0.4 mg SL tablet   No No   Si Tab by SubLINGual route every five (5) minutes as needed for Chest Pain. ondansetron (ZOFRAN ODT) 4 mg disintegrating tablet   No No   Sig: Take 1 Tab by mouth every eight (8) hours as needed for Nausea. Patient taking differently: Take 8 mg by mouth every eight (8) hours as needed for Nausea. oxyCODONE IR (ROXICODONE) 5 mg immediate release tablet   No No   Sig: Take 1 Tab by mouth every four (4) hours as needed for Pain. Max Daily Amount: 30 mg. Patient taking differently: Take 5 mg by mouth every four (4) hours as needed for Pain. Takes 10mg in am, 5mg mid day, 10mg at HS, and 5mg if needed during the night. oxybutynin (DITROPAN) 5 mg tablet   Yes No   Sig: Take 5 mg by mouth two (2) times a day. pantoprazole (PROTONIX) 40 mg tablet   No No   Sig: Take 1 Tab by mouth two (2) times a day. potassium chloride (K-DUR, KLOR-CON) 10 mEq tablet   No No   Sig: Take 1 Tab by mouth daily as needed. Only take this med on days when she  takes Lasix the two go together   rOPINIRole (REQUIP) 5 mg tablet   Yes No   Sig: Take 0.5 mg by mouth nightly. sertraline (ZOLOFT) 100 mg tablet Not Taking at Unknown time Self Yes No   Sig: Take 100 mg by mouth daily. simethicone (MYLICON) 80 mg chewable tablet Not Taking at Unknown time  No No   Sig: Take 1 Tab by mouth Before breakfast, lunch, and dinner.    traMADol (ULTRAM) 50 mg tablet Not Taking at Unknown time  No No   Sig: Take 1 Tab by mouth every six (6) hours as needed. Max Daily Amount: 200 mg. Facility-Administered Medications: None     Allergies   Allergen Reactions    Morphine Nausea and Vomiting    Other Medication Other (comments)     \"Took steroids this week and made my face red. \"    Sulfa (Sulfonamide Antibiotics) Rash      Social History   Substance Use Topics    Smoking status: Never Smoker    Smokeless tobacco: Not on file    Alcohol use No      Family History   Problem Relation Age of Onset    Hypertension Mother     Stroke Mother     Hypertension Father           Objective:     Visit Vitals    /80 (BP 1 Location: Left arm)    Pulse 86    Temp (!) 100.7 °F (38.2 °C)    Resp 18    Ht 4' 11\" (1.499 m)    Wt 62.1 kg (137 lb)    SpO2 96%    BMI 27.67 kg/m2      Temp (24hrs), Av.7 °F (38.2 °C), Min:100.7 °F (38.2 °C), Max:100.7 °F (38.2 °C)    Oxygen Therapy  O2 Sat (%): 96 % (17)  O2 Device: Room air (17)  Physical Exam:  General:    Mild lethargy, cooperative, no distress, appears stated age. Head:   Normocephalic, without obvious abnormality, atraumatic. Nose:  Nares normal. No drainage or sinus tenderness. Lungs:   CTA  Heart:  RRR  Abdomen:   Soft, non-tender. Not distended. Bowel sounds normal. No masses  Extremities: No cyanosis. No edema. No clubbing  Skin:     Texture, turgor normal. No rashes or lesions.   Not Jaundiced  Neurologic: Mild lethargy, oriented times 2, generalized weakness   Data Review:   Recent Results (from the past 24 hour(s))   CBC WITH AUTOMATED DIFF    Collection Time: 17  4:30 PM   Result Value Ref Range    WBC 16.8 (H) 4.3 - 11.1 K/uL    RBC 3.38 (L) 4.05 - 5.25 M/uL    HGB 10.3 (L) 11.7 - 15.4 g/dL    HCT 31.6 (L) 35.8 - 46.3 %    MCV 93.5 79.6 - 97.8 FL    MCH 30.5 26.1 - 32.9 PG    MCHC 32.6 31.4 - 35.0 g/dL    RDW 12.3 11.9 - 14.6 %    PLATELET 930 924 - 991 K/uL    MPV 9.9 (L) 10.8 - 14.1 FL    DF AUTOMATED      NEUTROPHILS 91 (H) 43 - 78 %    LYMPHOCYTES 3 (L) 13 - 44 %    MONOCYTES 3 (L) 4.0 - 12.0 %    EOSINOPHILS 3 0.5 - 7.8 %    BASOPHILS 0 0.0 - 2.0 %    IMMATURE GRANULOCYTES 0.3 0.0 - 5.0 %    ABS. NEUTROPHILS 15.2 (H) 1.7 - 8.2 K/UL    ABS. LYMPHOCYTES 0.5 0.5 - 4.6 K/UL    ABS. MONOCYTES 0.5 0.1 - 1.3 K/UL    ABS. EOSINOPHILS 0.5 0.0 - 0.8 K/UL    ABS. BASOPHILS 0.0 0.0 - 0.2 K/UL    ABS. IMM. GRANS. 0.1 0.0 - 0.5 K/UL   METABOLIC PANEL, COMPREHENSIVE    Collection Time: 02/17/17  4:30 PM   Result Value Ref Range    Sodium 133 (L) 136 - 145 mmol/L    Potassium 4.5 3.5 - 5.1 mmol/L    Chloride 96 (L) 98 - 107 mmol/L    CO2 27 21 - 32 mmol/L    Anion gap 10 7 - 16 mmol/L    Glucose 116 (H) 65 - 100 mg/dL    BUN 26 (H) 8 - 23 MG/DL    Creatinine 1.30 (H) 0.6 - 1.0 MG/DL    GFR est AA 50 (L) >60 ml/min/1.73m2    GFR est non-AA 41 (L) >60 ml/min/1.73m2    Calcium 9.1 8.3 - 10.4 MG/DL    Bilirubin, total 0.4 0.2 - 1.1 MG/DL    ALT (SGPT) 20 12 - 65 U/L    AST (SGOT) 26 15 - 37 U/L    Alk. phosphatase 114 50 - 136 U/L    Protein, total 7.1 6.3 - 8.2 g/dL    Albumin 3.1 (L) 3.2 - 4.6 g/dL    Globulin 4.0 (H) 2.3 - 3.5 g/dL    A-G Ratio 0.8 (L) 1.2 - 3.5     TROPONIN I    Collection Time: 02/17/17  4:30 PM   Result Value Ref Range    Troponin-I, Qt. 0.07 (H) 0.02 - 0.05 NG/ML   PROCALCITONIN    Collection Time: 02/17/17  4:30 PM   Result Value Ref Range    Procalcitonin 13.0 ng/mL   EKG, 12 LEAD, INITIAL    Collection Time: 02/17/17  4:30 PM   Result Value Ref Range    Systolic BP  mmHg    Diastolic BP  mmHg    Ventricular Rate 92 BPM    Atrial Rate 94 BPM    P-R Interval 170 ms    QRS Duration 82 ms    Q-T Interval 324 ms    QTC Calculation (Bezet) 400 ms    Calculated P Axis 56 degrees    Calculated R Axis 99 degrees    Calculated T Axis 108 degrees    Diagnosis       !! AGE AND GENDER SPECIFIC ECG ANALYSIS !!   Normal sinus rhythm  Lateral infarct , age undetermined  Abnormal ECG  When compared with ECG of 01-DEC-2016 19:17,  Lateral infarct is now Present     POC LACTIC ACID    Collection Time: 02/17/17  4:48 PM   Result Value Ref Range    Lactic Acid (POC) 1.1 0.5 - 1.9 mmol/L   URINE MICROSCOPIC    Collection Time: 02/17/17  5:50 PM   Result Value Ref Range    WBC 3-5 0 /hpf    RBC 0-3 0 /hpf    Epithelial cells 0-3 0 /hpf    Bacteria 4+ (H) 0 /hpf    Casts 0 0 /lpf    Crystals 0 0 /LPF    Amorphous Crystals 1+ (H) 0    Mucus 0 0 /lpf       Assessment and Plan: Active Hospital Problems    Diagnosis Date Noted    Hypoxia 02/17/2017    Acute encephalopathy 02/17/2017    Lewy body dementia 02/17/2017    CKD (chronic kidney disease) stage 3, GFR 30-59 ml/min 02/17/2017    Hypotension 02/17/2017    Sepsis ([facility] 02/12/2014    Coronary atherosclerosis of native coronary artery 03/16/2013    Spinal stenosis 08/30/2012    Essential hypertension, benign 08/30/2012    Dyslipidemia 08/30/2012    Chronic pain- chronic narcotic use for spinal stenosis 08/30/2012   Admit to remote telemetry   Vanco and Unasyn  Follow urine and blood cultures   Supplemental O2, check ABG.  Follow up CXR in AM, consult speech therapy  PT/OT  PPD  Continue OP medications as ordered   DNR as discussed with patient and HCPOA  Surrogate decision maker: daughter-in-law/HCPOA Stephanie Quevedo   Estimated length of stay:3-5 days   EBER Alfonso

## 2017-02-18 NOTE — PROGRESS NOTES
Pharmacokinetic Consult to Pharmacist    Boo Willams is a 80 y.o. female being treated for sepsis/UTI/CAP/aspiration with vancomycin and unasyn. Height: 4' 11\" (149.9 cm)  Weight: 62.1 kg (137 lb)  Lab Results   Component Value Date/Time    BUN 26 02/17/2017 04:30 PM    Creatinine 1.30 02/17/2017 04:30 PM    WBC 16.8 02/17/2017 04:30 PM    Procalcitonin 13.0 02/17/2017 04:30 PM      Estimated Creatinine Clearance: 31 mL/min (based on Cr of 1.3). CULTURES:  Pending. Day 1 of vancomycin. Goal trough is 15-20. Vancomycin dose initiated at 1 gram q 24hr. Will continue to follow patient.       Thank you,  Moses Jenkins, PharmD  Clinical Pharmacist

## 2017-02-18 NOTE — PROGRESS NOTES
TRANSFER - IN REPORT:    Verbal report received from [facility] Ambrose Velasquez RN(name) on Messi Cueva  being received from ED(unit) for routine progression of care      Report consisted of patients Situation, Background, Assessment and   Recommendations(SBAR). Information from the following report(s) SBAR, Kardex and ED Summary was reviewed with the receiving nurse. Opportunity for questions and clarification was provided. Assessment completed upon patients arrival to unit and care assumed. Admitted to 609. Oriented to room and call system. Dual skin assessment w/ Martha Cronin RN; skin intact. Database completed w/ patient's daughter in law. No complaints @ this time.

## 2017-02-19 PROBLEM — G93.49 ENCEPHALOPATHY DUE TO INFECTION: Status: RESOLVED | Noted: 2017-02-17 | Resolved: 2017-02-19

## 2017-02-19 PROBLEM — B99.9 ENCEPHALOPATHY DUE TO INFECTION: Status: RESOLVED | Noted: 2017-02-17 | Resolved: 2017-02-19

## 2017-02-19 LAB
ANION GAP BLD CALC-SCNC: 10 MMOL/L (ref 7–16)
BUN SERPL-MCNC: 16 MG/DL (ref 8–23)
CALCIUM SERPL-MCNC: 9 MG/DL (ref 8.3–10.4)
CHLORIDE SERPL-SCNC: 100 MMOL/L (ref 98–107)
CO2 SERPL-SCNC: 26 MMOL/L (ref 21–32)
CREAT SERPL-MCNC: 0.94 MG/DL (ref 0.6–1)
GLUCOSE SERPL-MCNC: 92 MG/DL (ref 65–100)
MM INDURATION POC: NORMAL MM (ref 0–5)
POTASSIUM SERPL-SCNC: 3.5 MMOL/L (ref 3.5–5.1)
PPD POC: NORMAL NEGATIVE
SODIUM SERPL-SCNC: 136 MMOL/L (ref 136–145)

## 2017-02-19 PROCEDURE — 36415 COLL VENOUS BLD VENIPUNCTURE: CPT | Performed by: INTERNAL MEDICINE

## 2017-02-19 PROCEDURE — 74011250637 HC RX REV CODE- 250/637: Performed by: INTERNAL MEDICINE

## 2017-02-19 PROCEDURE — 65660000000 HC RM CCU STEPDOWN

## 2017-02-19 PROCEDURE — 74011250636 HC RX REV CODE- 250/636

## 2017-02-19 PROCEDURE — 80048 BASIC METABOLIC PNL TOTAL CA: CPT | Performed by: INTERNAL MEDICINE

## 2017-02-19 PROCEDURE — 74011250637 HC RX REV CODE- 250/637

## 2017-02-19 PROCEDURE — 74011250636 HC RX REV CODE- 250/636: Performed by: INTERNAL MEDICINE

## 2017-02-19 RX ORDER — CEFPODOXIME PROXETIL 200 MG/1
200 TABLET, FILM COATED ORAL 2 TIMES DAILY
Qty: 12 TAB | Refills: 0 | Status: SHIPPED | OUTPATIENT
Start: 2017-02-19 | End: 2017-02-20

## 2017-02-19 RX ORDER — FUROSEMIDE 10 MG/ML
40 INJECTION INTRAMUSCULAR; INTRAVENOUS ONCE
Status: COMPLETED | OUTPATIENT
Start: 2017-02-19 | End: 2017-02-19

## 2017-02-19 RX ADMIN — OXYCODONE HYDROCHLORIDE 5 MG: 5 TABLET ORAL at 12:02

## 2017-02-19 RX ADMIN — CARVEDILOL 3.12 MG: 3.12 TABLET, FILM COATED ORAL at 17:32

## 2017-02-19 RX ADMIN — HEPARIN SODIUM 5000 UNITS: 5000 INJECTION, SOLUTION INTRAVENOUS; SUBCUTANEOUS at 17:33

## 2017-02-19 RX ADMIN — LACTOBACILLUS TAB 2 TABLET: TAB at 17:32

## 2017-02-19 RX ADMIN — FUROSEMIDE 40 MG: 10 INJECTION, SOLUTION INTRAMUSCULAR; INTRAVENOUS at 09:34

## 2017-02-19 RX ADMIN — PANTOPRAZOLE SODIUM 40 MG: 40 TABLET, DELAYED RELEASE ORAL at 17:32

## 2017-02-19 RX ADMIN — HEPARIN SODIUM 5000 UNITS: 5000 INJECTION, SOLUTION INTRAVENOUS; SUBCUTANEOUS at 05:20

## 2017-02-19 RX ADMIN — Medication 5 ML: at 05:22

## 2017-02-19 RX ADMIN — OXYBUTYNIN CHLORIDE 5 MG: 5 TABLET ORAL at 09:34

## 2017-02-19 RX ADMIN — OXYBUTYNIN CHLORIDE 5 MG: 5 TABLET ORAL at 21:22

## 2017-02-19 RX ADMIN — CYANOCOBALAMIN TAB 1000 MCG 1000 MCG: 1000 TAB at 09:34

## 2017-02-19 RX ADMIN — OXYCODONE HYDROCHLORIDE 5 MG: 5 TABLET ORAL at 05:15

## 2017-02-19 RX ADMIN — DOCUSATE SODIUM 100 MG: 100 CAPSULE, LIQUID FILLED ORAL at 17:32

## 2017-02-19 RX ADMIN — FERROUS SULFATE TAB 325 MG (65 MG ELEMENTAL FE) 325 MG: 325 (65 FE) TAB at 17:32

## 2017-02-19 RX ADMIN — Medication 5 ML: at 21:23

## 2017-02-19 RX ADMIN — PANTOPRAZOLE SODIUM 40 MG: 40 TABLET, DELAYED RELEASE ORAL at 05:15

## 2017-02-19 RX ADMIN — ROPINIROLE 0.5 MG: 0.5 TABLET, FILM COATED ORAL at 21:22

## 2017-02-19 RX ADMIN — ACETAMINOPHEN 500 MG: 500 TABLET ORAL at 17:32

## 2017-02-19 RX ADMIN — CARVEDILOL 3.12 MG: 3.12 TABLET, FILM COATED ORAL at 09:34

## 2017-02-19 RX ADMIN — HEPARIN SODIUM 5000 UNITS: 5000 INJECTION, SOLUTION INTRAVENOUS; SUBCUTANEOUS at 21:22

## 2017-02-19 RX ADMIN — LACTOBACILLUS TAB 2 TABLET: TAB at 09:34

## 2017-02-19 RX ADMIN — SIMETHICONE CHEW TAB 80 MG 80 MG: 80 TABLET ORAL at 05:15

## 2017-02-19 RX ADMIN — FERROUS SULFATE TAB 325 MG (65 MG ELEMENTAL FE) 325 MG: 325 (65 FE) TAB at 09:34

## 2017-02-19 RX ADMIN — DOCUSATE SODIUM 100 MG: 100 CAPSULE, LIQUID FILLED ORAL at 09:34

## 2017-02-19 RX ADMIN — ASPIRIN 81 MG 81 MG: 81 TABLET ORAL at 09:34

## 2017-02-19 RX ADMIN — ROSUVASTATIN CALCIUM 5 MG: 5 TABLET, FILM COATED ORAL at 21:22

## 2017-02-19 RX ADMIN — DOCUSATE SODIUM 100 MG: 100 CAPSULE, LIQUID FILLED ORAL at 21:22

## 2017-02-19 NOTE — PROGRESS NOTES
Hospitalist Progress Note     Admit Date:  2017  4:20 PM   Name:  Boo Mode   Age:  80 y.o.  :  1932   MRN:  [id number]   PCP:  David Aguiar MD  Treatment Team: Attending Provider: Sonja Akhtar MD; Utilization Review: Jenni Bay RN    Subjective:   Patient is an 81 y/o F who was admitted for UTI-related encephalopathy and failing outpatient treatment with macrobid. Met sepsis criteria on admission.  - pt feeling better. She is tired but no other complaints. No CP, SOB, cough.  - pt feeling good today. Still confused at times, seems to be baseline. Denies CP, SOB but still on 3L oxygen. Objective:     Patient Vitals for the past 24 hrs:   Temp Pulse Resp BP SpO2   17 1432 98.1 °F (36.7 °C) 67 16 118/64 94 %   17 1039 98.3 °F (36.8 °C) 64 16 146/76 90 %   17 0701 99.1 °F (37.3 °C) 73 18 153/74 96 %   17 0433 98.9 °F (37.2 °C) 78 17 162/80 97 %   17 2331 98.3 °F (36.8 °C) 68 17 153/83 99 %   17 1926 98.2 °F (36.8 °C) 61 20 140/68 92 %     Oxygen Therapy  O2 Sat (%): 94 % (17 1432)  Pulse via Oximetry: 96 beats per minute (17 1935)  O2 Device: Nasal cannula (17 2333)    Intake/Output Summary (Last 24 hours) at 17 1601  Last data filed at 17 0904   Gross per 24 hour   Intake              547 ml   Output                0 ml   Net              547 ml       General:    Well nourished. Alert. CV:   RRR. No murmur, rub, or gallop. Lungs:   Clear to auscultation bilaterally. No wheezing, rhonchi, or rales. Abdomen:   Soft, nontender, nondistended. Bowel sounds normal.   Extremities: Warm and dry. No cyanosis or edema. Skin:     No rashes or jaundice.      Current Meds:  Current Facility-Administered Medications   Medication Dose Route Frequency    magic mouthwash (LYDIA) oral suspension 5 mL  5 mL Oral Q4H PRN    carvedilol (COREG) tablet 3.125 mg  3.125 mg Oral BID WITH MEALS    cefTRIAXone (ROCEPHIN) 1 g in 0.9% sodium chloride (MBP/ADV) 50 mL  1 g IntraVENous Q24H    zolpidem (AMBIEN) tablet 5 mg  5 mg Oral QHS PRN    ondansetron (ZOFRAN) injection 4 mg  4 mg IntraVENous Q6H PRN    guaiFENesin-dextromethorphan (ROBITUSSIN DM) 100-10 mg/5 mL syrup 10 mL  10 mL Oral Q6H PRN    senna-docusate (PERICOLACE) 8.6-50 mg per tablet 2 Tab  2 Tab Oral DAILY PRN    diphenhydrAMINE (BENADRYL) injection 12.5 mg  12.5 mg IntraVENous Q4H PRN    bisacodyl (DULCOLAX) tablet 5 mg  5 mg Oral DAILY PRN    oxyCODONE IR (ROXICODONE) tablet 5 mg  5 mg Oral Q4H PRN    acetaminophen (TYLENOL) tablet 500 mg  500 mg Oral Q6H PRN    aspirin chewable tablet 81 mg  81 mg Oral DAILY    Lactobacillus Acidoph & Bulgar (FLORANEX) tablet 2 Tab  2 Tab Oral BID    cyanocobalamin tablet 1,000 mcg  1,000 mcg Oral DAILY    docusate sodium (COLACE) capsule 100 mg  100 mg Oral TID    ferrous sulfate tablet 325 mg  325 mg Oral BID WITH MEALS    oxybutynin (DITROPAN) tablet 5 mg  5 mg Oral BID    pantoprazole (PROTONIX) tablet 40 mg  40 mg Oral ACB&D    rOPINIRole (REQUIP) tablet 0.5 mg  0.5 mg Oral QHS    rosuvastatin (CRESTOR) tablet 5 mg  5 mg Oral Q MON, WED & FRI    simethicone (MYLICON) tablet 80 mg  80 mg Oral TIDAC    sodium chloride (NS) flush 5 mL  5 mL InterCATHeter Q8H    sodium chloride (NS) flush 5-10 mL  5-10 mL InterCATHeter PRN    heparin (porcine) injection 5,000 Units  5,000 Units SubCUTAneous Q8H       Labs and Studies:  I have reviewed all labs, meds, telemetry events, and studies from the last 24 hours.   Recent Results (from the past 24 hour(s))   PLEASE READ & DOCUMENT PPD TEST IN 24 HRS    Collection Time: 02/18/17 11:00 PM   Result Value Ref Range    PPD  Negative    mm Induration  0 mm   METABOLIC PANEL, BASIC    Collection Time: 02/19/17  5:36 AM   Result Value Ref Range    Sodium 136 136 - 145 mmol/L    Potassium 3.5 3.5 - 5.1 mmol/L    Chloride 100 98 - 107 mmol/L    CO2 26 21 - 32 mmol/L    Anion gap 10 7 - 16 mmol/L    Glucose 92 65 - 100 mg/dL    BUN 16 8 - 23 MG/DL    Creatinine 0.94 0.6 - 1.0 MG/DL    GFR est AA >60 >60 ml/min/1.73m2    GFR est non-AA >60 >60 ml/min/1.73m2    Calcium 9.0 8.3 - 10.4 MG/DL        All Micro Results     Procedure Component Value Units Date/Time    CULTURE, URINE [[id number]] Collected:  02/17/17 1750    Order Status:  Completed Specimen:  Clean catch Updated:  02/19/17 0829     Special Requests: NO SPECIAL REQUESTS        Culture result: NO GROWTH 1 DAY       CULTURE, BLOOD [[id number]] Collected:  02/17/17 1710    Order Status:  Completed Specimen:  Blood from Blood Updated:  02/19/17 0601     Special Requests: RIGHT HAND        Culture result: NO GROWTH 2 DAYS       CULTURE, BLOOD [[id number]] Collected:  02/17/17 1720    Order Status:  Completed Specimen:  Blood from Blood Updated:  02/19/17 0601     Special Requests: RIGHT HAND        Culture result: NO GROWTH 2 DAYS             Recent Imaging:  CXR Results  (Last 48 hours)               02/18/17 0835  XR CHEST PA LAT Final result    Impression:  IMPRESSION:   Suspect mild volume overload. Narrative:  Frontal and lateral views of the chest        Comparison: 2/17/2017       Indication: Hypoxia, history of sepsis       FINDINGS: Mild cardiac enlargement. Increased congestion of the lower lobes. Small effusions. No pneumothorax or lobar consolidation. No discrete acute   osseous lesion seen. 02/17/17 1707  XR CHEST PA LAT Final result    Impression:  IMPRESSION:   1. Mild interstitial prominence at the lung bases could represent underlying   chronic change versus interstitial edema. 2. Hiatal hernia. Narrative:  Two view chest:  02/17/2017       History: weakness. Comparison: 12/01/2016       Findings: The heart is normal in size. There is a large hiatal hernia. There is   mild interstitial prominence at the lung bases. There are no pleural effusions.    There is diffuse osteopenia. Remote compression fracture is present at the   thoracolumbar junction. A vascular stent overlies the upper abdomen posteriorly. CT Results  (Last 48 hours)    None          Assessment and Plan:     Hospital Problems as of 2/19/2017  Date Reviewed: 7/28/2016          Codes Class Noted - Resolved POA    * (Principal)Acute cystitis without hematuria ICD-10-CM: N30.00  ICD-9-CM: 595.0  2/18/2017 - Present Yes        DNR (do not resuscitate) (Chronic) ICD-10-CM: Z66  ICD-9-CM: V49.86  2/18/2017 - Present Yes        Lewy body dementia (Chronic) ICD-10-CM: G31.83, F02.80  ICD-9-CM: 331.82  2/17/2017 - Present Yes        CKD (chronic kidney disease) stage 3, GFR 30-59 ml/min (Chronic) ICD-10-CM: N18.3  ICD-9-CM: 585.3  2/17/2017 - Present Yes        Diastolic dysfunction (Chronic) ICD-10-CM: I51.9  ICD-9-CM: 429.9  2/17/2017 - Present Yes        Coronary atherosclerosis of native coronary artery (Chronic) ICD-10-CM: I25.10  ICD-9-CM: 414.01  3/16/2013 - Present Yes        Essential hypertension, benign (Chronic) ICD-10-CM: I10  ICD-9-CM: 401.1  8/30/2012 - Present Yes        Dyslipidemia (Chronic) ICD-10-CM: E78.5  ICD-9-CM: 272.4  8/30/2012 - Present Yes        Spinal stenosis (Chronic) ICD-10-CM: M48.00  ICD-9-CM: 724.00  8/30/2012 - Present Yes        Chronic pain- chronic narcotic use for spinal stenosis (Chronic) ICD-10-CM: U50.22  ICD-9-CM: 338.29  8/30/2012 - Present Yes        RESOLVED: Hypoxia ICD-10-CM: R09.02  ICD-9-CM: 799.02  2/17/2017 - 2/18/2017 Yes        RESOLVED: Encephalopathy due to infection ICD-10-CM: G93.49, B99.9  ICD-9-CM: 348.39, 136.9  2/17/2017 - 2/19/2017 Yes        RESOLVED: Hypotension ICD-10-CM: I95.9  ICD-9-CM: 458.9  2/17/2017 - 2/18/2017 Yes        RESOLVED: Sepsis due to Escherichia coli [facility]) ICD-10-CM: A41.51  ICD-9-CM: 038.42, 995.91  2/12/2014 - 2/18/2017 Yes                PLAN:    · Cont rocephin. Presumed e coli.   History of same in past. Monitor urine culture. · Give lasix x1 for mild volume overload.   Monitor    DC planning:  Plan to discharge tomorrow  DVT ppx:  heparin  Discussed plan with:  pt    Signed:  Jeramy Telles MD

## 2017-02-19 NOTE — PROGRESS NOTES
Care Management Interventions  PCP Verified by CM: Yes  Transition of Care Consult (CM Consult): Home Health, Discharge [id number] [address]: No  Reason Outside [place]: Patient already serviced by other home care/hospice agency  Discharge Durable Medical Equipment: No  Physical Therapy Consult: Yes  Current Support Network: Lives with Caregiver  Confirm Follow Up Transport: Family  Plan discussed with Pt/Family/Caregiver: Yes  Freedom of Choice Offered: Yes  Discharge Location  Discharge Placement: Home with home health    ROSAMARIA met with patient this date for D/C planning. Pt lives with her daughter in law Ms. Moe Medal [phone]. Pt is no longer driving and uses a wheelchair at home. Pt stated she is receiving in home care via [facility]. ROSAMARIA affirmed this information with daughter in law via telephone. ROSAMARIA faxed resumption order for [facility], Nurse Aide, PT/OT and SW. Patient is also said to have medicaid and is in on the waitlist for a care aide from [facility] according to daughter in law.

## 2017-02-19 NOTE — DISCHARGE SUMMARY
Hospitalist Discharge Summary     Admit Date:  2017  4:20 PM   Name:  Perry Kaufman   Age:  80 y.o.  :  1932   MRN:  [id number]   PCP:  Yan Bustillos MD  Treatment Team: Attending Provider: Polina Wills MD; Utilization Review: Howard Roldan RN    Problem List for this Hospitalization:  Hospital Problems as of 2017  Date Reviewed: 2016          Codes Class Noted - Resolved POA    * (Principal)Acute cystitis without hematuria ICD-10-CM: N30.00  ICD-9-CM: 595.0  2017 - Present Yes        DNR (do not resuscitate) (Chronic) ICD-10-CM: Z66  ICD-9-CM: V49.86  2017 - Present Yes        Lewy body dementia (Chronic) ICD-10-CM: G31.83, F02.80  ICD-9-CM: 331.82  2017 - Present Yes        CKD (chronic kidney disease) stage 3, GFR 30-59 ml/min (Chronic) ICD-10-CM: N18.3  ICD-9-CM: 585.3  2017 - Present Yes        Diastolic dysfunction (Chronic) ICD-10-CM: I51.9  ICD-9-CM: 429.9  2017 - Present Yes        Coronary atherosclerosis of native coronary artery (Chronic) ICD-10-CM: I25.10  ICD-9-CM: 414.01  3/16/2013 - Present Yes        Essential hypertension, benign (Chronic) ICD-10-CM: I10  ICD-9-CM: 401.1  2012 - Present Yes        Dyslipidemia (Chronic) ICD-10-CM: E78.5  ICD-9-CM: 272.4  2012 - Present Yes        Spinal stenosis (Chronic) ICD-10-CM: M48.00  ICD-9-CM: 724.00  2012 - Present Yes        Chronic pain- chronic narcotic use for spinal stenosis (Chronic) ICD-10-CM: P02.52  ICD-9-CM: 338.29  2012 - Present Yes        RESOLVED: Hypoxia ICD-10-CM: R09.02  ICD-9-CM: 799.02  2017 - 2017 Yes        RESOLVED: Encephalopathy due to infection ICD-10-CM: G93.49, B99.9  ICD-9-CM: 348.39, 136.9  2017 - 2017 Yes        RESOLVED: Hypotension ICD-10-CM: I95.9  ICD-9-CM: 458.9  2017 - 2017 Yes        RESOLVED: Sepsis due to Escherichia coli [facility]) ICD-10-CM: A41.51  ICD-9-CM: 038.42, 995.91  2014 - 2017 Yes                Admission HPI from 2/17/2017:    \" Gilbert Toribio is a 80 y.o. female that presented to the ED with a 1 week history of dysuria and was started on empiric treatment with Macrobid as OP. She reports dysuria has improved but has 2 day history of fever, chills, confusion and N/V. She was seen by her PCP today and noted to have BP of 60/47 and subsequently sent to the ED. She denies difficulty breathing. She complains of arthritis pain in her left knee and hips, no other pain. No diarrhea. In the ED, UA with 3-5 WBC and 4+ bacteria, CXR detailed below. She is hypoxic on room air with O2 sat in the mid 80's, 94% on 3L NC. She has chronic cough and has been coughing more recently while eating per family. \"    Hospital Course:  Patient is an 81 y/o F who was admitted for UTI-related encephalopathy and failing outpatient treatment with Macrobid. Met sepsis criteria on admission. She was given 2L bolus IVF in ER which caused her to have some mild volume overload, though she was still satting well on room air. She was restarted on home lasix which she takes as needed, and given one dose of lasix IV. Her WBC normalized on rocephin; she can continue vantin as an outpatient. PT evaluated patient and said she was appropriate to continue home health services. Follow up instructions below. Plan was discussed with pt. All questions answered. Patient was stable at time of discharge and was instructed to call or return if there are any concerns or recurrence of symptoms. Diagnostic Imaging/Tests:        All Micro Results     Procedure Component Value Units Date/Time    CULTURE, URINE [[id number]] Collected:  02/17/17 1750    Order Status:  Completed Specimen:  Clean catch Updated:  02/19/17 7192     Special Requests: NO SPECIAL REQUESTS        Culture result: NO GROWTH 1 DAY       CULTURE, BLOOD [[id number]] Collected:  02/17/17 1710    Order Status:  Completed Specimen:  Blood from Blood Updated: 02/19/17 0601     Special Requests: RIGHT HAND        Culture result: NO GROWTH 2 DAYS       CULTURE, BLOOD [[id number]] Collected:  02/17/17 1720    Order Status:  Completed Specimen:  Blood from Blood Updated:  02/19/17 0601     Special Requests: RIGHT HAND        Culture result: NO GROWTH 2 DAYS             Labs: Results:       BMP Recent Labs      02/19/17   0536  02/18/17   0600  02/17/17   1630   NA  136  138  133*   K  3.5  3.8  4.5   CL  100  104  96*   CO2  26  26  27   AGAP  10  8  10   BUN  16  22  26*   CREA  0.94  1.09*  1.30*   CA  9.0  8.7  9.1   GLU  92  78  116*      CBC Recent Labs      02/18/17   0600  02/17/17   1630   WBC  10.9  16.8*   RBC  2.95*  3.38*   HGB  8.9*  10.3*   HCT  27.7*  31.6*   PLT  234  260   GRANS  81*  91*   LYMPH  9*  3*   EOS  5  3   MONOS  5  3*   BASOS  0  0   IG  0.2  0.3   ANEU  8.7*  15.2*   ABL  1.0  0.5   PAM  0.5  0.5   ABM  0.6  0.5   ABB  0.0  0.0   AIG  0.0  0.1      LFT Recent Labs      02/17/17   1630   SGOT  26   ALT  20   AP  114   TP  7.1   ALB  3.1*   GLOB  4.0*   AGRAT  0.8*      Cardiac Testing Lab Results   Component Value Date/Time    BNP 42 01/04/2011 09:51 PM    CK 89 09/28/2012 03:00 PM    CK 54 08/30/2012 07:25 AM    CK - MB 1.2 09/28/2012 03:00 PM    CK-MB Index 1.3 09/28/2012 03:00 PM    Troponin-I <0.05 01/04/2011 09:51 PM    Troponin-I, Qt. 0.07 02/17/2017 04:30 PM    Troponin-I, Qt. <0.02 12/01/2016 07:10 PM    Troponin-I, Qt. <0.02 03/25/2016 03:40 PM      Coagulation Tests Lab Results   Component Value Date/Time    Prothrombin time 10.3 03/05/2016 03:44 AM    Prothrombin time 10.8 02/24/2016 05:26 PM    Prothrombin time 10.7 04/08/2015 07:30 AM    INR 1.0 03/05/2016 03:44 AM    INR 1.0 02/24/2016 05:26 PM    INR 1.0 04/08/2015 07:30 AM    aPTT 25.8 03/05/2016 03:44 AM    aPTT 28.2 04/08/2015 07:30 AM    aPTT 77.6 09/28/2012 03:00 PM      A1c No results found for: HBA1C, HGBE8, WYE3SIOX, PHK2RDIV   Lipid Panel No results found for: CHOL, Roberto Grit, CHLST, CHOLV, [id number], HDL, LDL, NLDLCT, DLDL, LDLC, DLDLP, [id number], VLDLC, VLDL, TGL, [facility], [id number], Ryanne Morales, [id number], [facility] [place], [facility]   Thyroid Panel Lab Results   Component Value Date/Time    TSH 1.200 03/17/2013 05:39 AM        Most Recent UA Lab Results   Component Value Date/Time    Color YELLOW 03/05/2016 06:50 AM    Appearance CLEAR 03/05/2016 06:50 AM    Specific gravity 1.006 03/13/2015 04:00 AM    pH (UA) 8.0 03/05/2016 06:50 AM    Protein TRACE 03/05/2016 06:50 AM    Glucose NEGATIVE  03/05/2016 06:50 AM    Ketone NEGATIVE  03/05/2016 06:50 AM    Bilirubin NEGATIVE  03/05/2016 06:50 AM    Blood TRACE 03/05/2016 06:50 AM    Urobilinogen 0.2 03/05/2016 06:50 AM    Nitrites NEGATIVE  03/05/2016 06:50 AM    Leukocyte Esterase NEGATIVE  03/05/2016 06:50 AM        Allergies   Allergen Reactions    Morphine Nausea and Vomiting    Other Medication Other (comments)     \"Took steroids this week and made my face red. \"    Sulfa (Sulfonamide Antibiotics) Rash     Immunization History   Administered Date(s) Administered    Influenza Vaccine 10/15/2015    Pneumococcal Vaccine (Unspecified Type) 08/31/2010    TB Skin Test (PPD) Intradermal 02/13/2014, 03/12/2015, 02/25/2016, 03/05/2016, 02/17/2017       All Labs from Last 24 Hrs:  Recent Results (from the past 24 hour(s))   PLEASE READ & DOCUMENT PPD TEST IN 24 HRS    Collection Time: 02/18/17 11:00 PM   Result Value Ref Range    PPD  Negative    mm Induration  0 mm   METABOLIC PANEL, BASIC    Collection Time: 02/19/17  5:36 AM   Result Value Ref Range    Sodium 136 136 - 145 mmol/L    Potassium 3.5 3.5 - 5.1 mmol/L    Chloride 100 98 - 107 mmol/L    CO2 26 21 - 32 mmol/L    Anion gap 10 7 - 16 mmol/L    Glucose 92 65 - 100 mg/dL    BUN 16 8 - 23 MG/DL    Creatinine 0.94 0.6 - 1.0 MG/DL    GFR est AA >60 >60 ml/min/1.73m2    GFR est non-AA >60 >60 ml/min/1.73m2    Calcium 9.0 8.3 - 10.4 MG/DL       Discharge Exam:  Patient Vitals for the past 24 hrs:   Temp Pulse Resp BP SpO2   02/19/17 1039 98.3 °F (36.8 °C) 64 16 146/76 90 %   02/19/17 0701 99.1 °F (37.3 °C) 73 18 153/74 96 %   02/19/17 0433 98.9 °F (37.2 °C) 78 17 162/80 97 %   02/18/17 2331 98.3 °F (36.8 °C) 68 17 153/83 99 %   02/18/17 1926 98.2 °F (36.8 °C) 61 20 140/68 92 %   02/18/17 1459 97.9 °F (36.6 °C) 75 16 135/70 99 %     Oxygen Therapy  O2 Sat (%): 90 % (02/19/17 1039)  Pulse via Oximetry: 96 beats per minute (02/17/17 1935)  O2 Device: Nasal cannula (02/17/17 2333)    Intake/Output Summary (Last 24 hours) at 02/19/17 1121  Last data filed at 02/19/17 0904   Gross per 24 hour   Intake              547 ml   Output                0 ml   Net              547 ml       General:    Well nourished. Alert. No distress. Eyes:   Normal sclera. Extraocular movements intact. ENT:  Normocephalic, atraumatic. Moist mucous membranes  CV:   Regular rate and rhythm. No murmur, rub, or gallop. Lungs:  Clear to auscultation bilaterally. No wheezing, rhonchi, or rales. Abdomen: Soft, nontender, nondistended. Bowel sounds normal.   Extremities: Warm and dry. No cyanosis or edema. Neurologic: CN II-XII grossly intact. Sensation intact. Skin:     No rashes or jaundice. No wounds. Psych:  Normal mood and affect. Discharge Info:        Current Discharge Medication List      START taking these medications    Details   cefpodoxime (VANTIN) 200 mg tablet Take 1 Tab by mouth two (2) times a day for 6 days. Qty: 12 Tab, Refills: 0         CONTINUE these medications which have NOT CHANGED    Details   acetaminophen (TYLENOL) 500 mg tablet Take 500 mg by mouth every six (6) hours as needed for Pain. Indications: Pain      cyanocobalamin 1,000 mcg tablet Take 1,000 mcg by mouth daily. Indications: PREVENTION OF VITAMIN B12 DEFICIENCY      diclofenac (VOLTAREN) 1 % gel Apply 2 g to affected area four (4) times daily.  Indications: OSTEOARTHRITIS      docusate sodium (COLACE) 100 mg capsule Take 100 mg by mouth three (3) times daily. Indications: Constipation      DULoxetine (CYMBALTA) 60 mg capsule Take 60 mg by mouth nightly. esomeprazole (NEXIUM) 40 mg capsule Take  by mouth daily. B.infantis-B.ani-B.long-B.bifi (PROBIOTIC 4X) 10-15 mg TbEC Take  by mouth. rosuvastatin (CRESTOR) 5 mg tablet Take 5 mg by mouth every Monday, Wednesday, Friday. oxybutynin (DITROPAN) 5 mg tablet Take 5 mg by mouth two (2) times a day. ferrous sulfate (IRON) 325 mg (65 mg iron) EC tablet Take 1 Tab by mouth two (2) times a day. Qty: 60 Tab, Refills: 0      aspirin 81 mg chewable tablet Take 1 Tab by mouth daily. Resume Aspirin on March 3rd (  Will complete 7 days off   ASA )  Qty: 30 Tab, Refills: 0      carvedilol (COREG) 3.125 mg tablet Take 1 Tab by mouth two (2) times daily (with meals). Qty: 60 Tab, Refills: 0      lisinopril (PRINIVIL, ZESTRIL) 20 mg tablet Take 1.5 Tabs by mouth daily. Qty: 45 Tab, Refills: 0      rOPINIRole (REQUIP) 5 mg tablet Take 0.5 mg by mouth nightly. ondansetron (ZOFRAN ODT) 4 mg disintegrating tablet Take 1 Tab by mouth every eight (8) hours as needed for Nausea. Qty: 10 Tab, Refills: 0      nitroglycerin (NITROSTAT) 0.4 mg SL tablet 1 Tab by SubLINGual route every five (5) minutes as needed for Chest Pain. Qty: 1 Bottle, Refills: 3      FAMOTIDINE/CA CARB/MAG HYDROX (PEPCID COMPLETE PO) Take  by mouth. furosemide (LASIX) 20 mg tablet Take 1 Tab by mouth daily as needed. Qty: 15 Tab, Refills: 5      potassium chloride (K-DUR, KLOR-CON) 10 mEq tablet Take 1 Tab by mouth daily as needed. Only take this med on days when she  takes Lasix the two go together  Qty: 15 Tab, Refills: 5      HYDROcodone-acetaminophen (NORCO)  mg tablet Take 1 Tab by mouth every six (6) hours as needed for Pain. Max Daily Amount: 4 Tabs. Qty: 20 Tab, Refills: 0      traMADol (ULTRAM) 50 mg tablet Take 1 Tab by mouth every six (6) hours as needed.  Max Daily Amount: 200 mg.  Qty: 90 Tab, Refills: 0      oxyCODONE IR (ROXICODONE) 5 mg immediate release tablet Take 1 Tab by mouth every four (4) hours as needed for Pain. Max Daily Amount: 30 mg.  Qty: 90 Tab, Refills: 0      lactase (LACTAID) 3,000 unit tablet Take 1 Tab by mouth three (3) times daily (with meals). Take if patient will eat dairy products  Qty: 90 Tab, Refills: 0      simethicone (MYLICON) 80 mg chewable tablet Take 1 Tab by mouth Before breakfast, lunch, and dinner. Qty: 90 Tab, Refills: 0      pantoprazole (PROTONIX) 40 mg tablet Take 1 Tab by mouth two (2) times a day. Qty: 60 Tab, Refills: 6      sertraline (ZOLOFT) 100 mg tablet Take 100 mg by mouth daily. STOP taking these medications       ferrous sulfate 325 mg (65 mg iron) tablet Comments:   Reason for Stopping:               Disposition: home health  Activity: PT/OT per Home Health  Diet: Resume previous diet    Follow-up Information     Follow up With Details Comments 1285 Chris Menjivar MD In 3 days follow up hospital stay [address] [facility],[address]  [phone]              Time spent in patient discharge planning and coordination 35 minutes.     Signed:  Edith Corcoran MD

## 2017-02-19 NOTE — PROGRESS NOTES
SW alerted by nurse that family was in room and had questions about discharge. SW met with daughter in room. Daughter cited that she is concerned that her mother/ patient is not able to transfer from bed to wheelchair to commode and stated that patient needs to be able to do this on her own as daughter works during the day and there is not always another caregiver in the home. Call into PT to reassess patient's functioning this date and address any safety concerns cited by daughter pertaining to safe transfers. ROSAMARIA alerted physician that we are awaiting PT reassessment this date, Due to weekend staffing there is some concern that PT may be unable to reassess patient this date. Daughter stated that if patient is unsafe to complete transfers independently that she and patient would like to pursue STR. Patient has been at [facility] in the past and would like to return there if STR is needed. PPD was placed on 2/17. We will await re evaluation by PT to complete discharge plan. If pt is felt safe to return home, there is already an order to resume [facility].

## 2017-02-19 NOTE — PROGRESS NOTES
Pt resting in bed with daughter-in-law at bedside. Per SHAKIRA, pt seems weaker and more confused and she is not comfortable with her going home with home health. She is fearful of her being alone, as she transfers from chair to wheelchair for activities. Pt is also having O2 sat of 89-90% consistently on room air. SHAKIRA hoping that the patient can be evaluated for rehab opposed to home health. PT has not seen patient today; however, they did eval them yesterday. Spoke with Dr. Janett Adam and ROSAMARIA Delcid.      Orders to cancel discharge at this time. Per Harley Vega, there is a chance pt may be accepted to rehab.

## 2017-02-19 NOTE — PROGRESS NOTES
Pt on 3L 02. O2 removed and O2 sat probe placed on pt's finger for thirty minutes- sats 88-90% while pt sleeping (and mouth-breathing). Pt woke up and asked to breathe through her nose, sats now 90-93% on room air, even while pt is back to sleep.

## 2017-02-20 VITALS
TEMPERATURE: 98.7 F | WEIGHT: 137 LBS | OXYGEN SATURATION: 98 % | HEIGHT: 59 IN | RESPIRATION RATE: 17 BRPM | BODY MASS INDEX: 27.62 KG/M2 | HEART RATE: 72 BPM | SYSTOLIC BLOOD PRESSURE: 144 MMHG | DIASTOLIC BLOOD PRESSURE: 67 MMHG

## 2017-02-20 LAB
BACTERIA SPEC CULT: NORMAL
SERVICE CMNT-IMP: NORMAL

## 2017-02-20 PROCEDURE — 74011250636 HC RX REV CODE- 250/636: Performed by: INTERNAL MEDICINE

## 2017-02-20 PROCEDURE — 74011250637 HC RX REV CODE- 250/637: Performed by: INTERNAL MEDICINE

## 2017-02-20 PROCEDURE — 74011250636 HC RX REV CODE- 250/636

## 2017-02-20 PROCEDURE — 97530 THERAPEUTIC ACTIVITIES: CPT

## 2017-02-20 PROCEDURE — 74011250637 HC RX REV CODE- 250/637

## 2017-02-20 PROCEDURE — 74011000258 HC RX REV CODE- 258: Performed by: INTERNAL MEDICINE

## 2017-02-20 PROCEDURE — 97165 OT EVAL LOW COMPLEX 30 MIN: CPT

## 2017-02-20 RX ORDER — CEFPODOXIME PROXETIL 200 MG/1
200 TABLET, FILM COATED ORAL EVERY 12 HOURS
Qty: 6 TAB | Refills: 0 | Status: SHIPPED | OUTPATIENT
Start: 2017-02-20 | End: 2017-02-23

## 2017-02-20 RX ORDER — CEFPODOXIME PROXETIL 200 MG/1
200 TABLET, FILM COATED ORAL EVERY 12 HOURS
Status: DISCONTINUED | OUTPATIENT
Start: 2017-02-20 | End: 2017-02-20 | Stop reason: HOSPADM

## 2017-02-20 RX ADMIN — CYANOCOBALAMIN TAB 1000 MCG 1000 MCG: 1000 TAB at 09:52

## 2017-02-20 RX ADMIN — FERROUS SULFATE TAB 325 MG (65 MG ELEMENTAL FE) 325 MG: 325 (65 FE) TAB at 09:52

## 2017-02-20 RX ADMIN — CEFPODOXIME PROXETIL 200 MG: 200 TABLET, FILM COATED ORAL at 13:30

## 2017-02-20 RX ADMIN — SIMETHICONE CHEW TAB 80 MG 80 MG: 80 TABLET ORAL at 11:57

## 2017-02-20 RX ADMIN — HEPARIN SODIUM 5000 UNITS: 5000 INJECTION, SOLUTION INTRAVENOUS; SUBCUTANEOUS at 05:23

## 2017-02-20 RX ADMIN — CARVEDILOL 3.12 MG: 3.12 TABLET, FILM COATED ORAL at 09:52

## 2017-02-20 RX ADMIN — HEPARIN SODIUM 5000 UNITS: 5000 INJECTION, SOLUTION INTRAVENOUS; SUBCUTANEOUS at 13:31

## 2017-02-20 RX ADMIN — SIMETHICONE CHEW TAB 80 MG 80 MG: 80 TABLET ORAL at 09:56

## 2017-02-20 RX ADMIN — LACTOBACILLUS TAB 2 TABLET: TAB at 09:52

## 2017-02-20 RX ADMIN — PANTOPRAZOLE SODIUM 40 MG: 40 TABLET, DELAYED RELEASE ORAL at 05:24

## 2017-02-20 RX ADMIN — ASPIRIN 81 MG 81 MG: 81 TABLET ORAL at 09:51

## 2017-02-20 RX ADMIN — DOCUSATE SODIUM 100 MG: 100 CAPSULE, LIQUID FILLED ORAL at 09:52

## 2017-02-20 RX ADMIN — OXYBUTYNIN CHLORIDE 5 MG: 5 TABLET ORAL at 09:52

## 2017-02-20 RX ADMIN — Medication 5 ML: at 06:00

## 2017-02-20 NOTE — PROGRESS NOTES
Problem: Self Care Deficits Care Plan (Adult)  Goal: *Acute Goals and Plan of Care (Insert Text)      OCCUPATIONAL THERAPY: Initial Assessment and Discharge 2/20/2017  INPATIENT: Hospital Day: 4  [place] / Product Type: Medicare /      NAME/AGE/GENDER: Wilmer Tate is a 80 y.o. female           PRIMARY DIAGNOSIS:  Sepsis ([facility] [address] Acute cystitis without hematuria Acute cystitis without hematuria        ICD-10: Treatment Diagnosis:        · Generalized Muscle Weakness (M62.81)   Precautions/Allergies:         Morphine; Other medication; and Sulfa (sulfonamide antibiotics)       ASSESSMENT:      Ms. Charleen Almazan presents with RN present, agreeable to OT evaluation. Pt demonstrates donning socks with setup, taking her own medications, and able to navigate bathroom safely for toileting. Pt with BUE strength and ROM decreased though functional.  Defer decreased activity tolerance deficits and transfer training to PT. Pt appears to be at baseline for ADLs. Further OT is not indicated at this time. D/C      This section established at most recent assessment                RECOMMENDED REHABILITATION/EQUIPMENT: (at time of discharge pending progress): defer to PT. OCCUPATIONAL PROFILE AND HISTORY:   History of Present Injury/Illness (Reason for Referral):  See H&P  Past Medical History/Comorbidities:   Ms. Charleen Almazan  has a past medical history of Acute myocardial infarction of other anterior wall, initial episode of care [facility]) (8/30/2012); Acute systolic congestive heart failure- in setting of anterior MI, EF 35-40% (8/30/2012); Acute systolic heart failure ([facility] [address] ARF (acute renal failure) ([facility] [address] (9/29/2012); Arthritis; CAD (coronary artery disease); Carotid artery stenosis without cerebral infarction; Chronic pain- chronic narcotic use for spinal stenosis (8/30/2012); Coronary atherosclerosis of native coronary artery (3/16/2013);  Diarrhea (10/2/2012); Dyslipidemia (8/30/2012); Dyspnea; Essential hypertension, benign (8/30/2012); Heart failure ([facility] [address] Hyperlipidemia; Hypertension; Ischemic colitis ([facility] [address] Lower GI bleed (3/12/2015); Myocardial infarction, anterolateral wall, subsequent care ([facility] [address] N&V (nausea and vomiting) (3/16/2013); Sepsis ([facility] (2/12/2014); Spinal stenosis (8/30/2012); ST elevation (STEMI) myocardial infarction involving left anterior descending coronary artery ([facility] [address] and Syncope and collapse (2/12/2014). She also has no past medical history of Unspecified sleep apnea.   Ms. Darlene Crain  has a past surgical history that includes cholecystectomy; gyn; appendectomy; orthopaedic; other surgical; coronary stent placement; and cardiac surg procedure unlist.  Social History/Living Environment:   Home Environment: Private residence  # Steps to Enter: 0  Wheelchair Ramp: Yes  One/Two Story Residence: One story  Living Alone: No  Support Systems: Family member(s)  Patient Expects to be Discharged to[de-identified] Private residence  Current DME Used/Available at Home: Raised toilet seat, Shower chair  Tub or Shower Type: Tub/Shower combination  Prior Level of Function/Work/Activity:  Requires some assistance      Number of Personal Factors/Comorbidities that affect the Plan of Care: Expanded review of therapy/medical records (1-2):  MODERATE COMPLEXITY   ASSESSMENT OF OCCUPATIONAL PERFORMANCE[de-identified]   Activities of Daily Living:           Basic ADLs (From Assessment) Complex ADLs (From Assessment)   Basic ADL  Feeding: Setup  Oral Facial Hygiene/Grooming: Setup  Bathing: Supervision  Upper Body Dressing: Modified independent  Lower Body Dressing: Modified independent  Toileting: Stand by assistance     Grooming/Bathing/Dressing Activities of Daily Living     Cognitive Retraining  Safety/Judgement: Awareness of environment           Toileting  Toileting Assistance: Contact guard assistance  Bladder Hygiene: Supervision/set-up  Bowel Hygiene: [address] Management: Supervision/set-up     Functional Transfers  Toilet Transfer : Contact guard assistance;Stand-by asssistance  Tub Transfer: Contact guard assistance (secondary to safety)   Lower Body Dressing Assistance  Dressing Assistance: Supervision/set up  Socks: Supervision/set-up Bed/Mat Mobility  Rolling: Supervision  Supine to Sit: Supervision  Sit to Stand: Stand-by asssistance  Bed to Chair: Stand-by asssistance          Most Recent Physical Functioning:   Gross Assessment:  AROM: Generally decreased, functional  Strength: Generally decreased, functional               Posture:  Posture (WDL): Exceptions to WDL  Posture Assessment: Forward head, Rounded shoulders, Trunk flexion  Balance:  Sitting: Intact  Sitting - Static: Good (unsupported); Prop sitting  Sitting - Dynamic: Supported sitting  Standing: Impaired  Standing - Static: Fair  Standing - Dynamic : Fair Bed Mobility:  Rolling: Supervision  Supine to Sit: Supervision  Wheelchair Mobility:     Transfers:  Sit to Stand: Stand-by asssistance  Stand to Sit: Stand-by asssistance  Bed to Chair: Stand-by asssistance                 Patient Vitals for the past 6 hrs:       BP BP Patient Position SpO2 Pulse   17 0800 159/89 At rest 94 % 77   17 1146 151/79 Sitting 97 % 69        Mental Status  Neurologic State: Alert  Orientation Level: Oriented X4  Cognition: Follows commands, Decreased attention/concentration  Perception: Appears intact  Perseveration: No perseveration noted  Safety/Judgement: Awareness of environment                               Physical Skills Involved:  1. Balance  2. Mobility  3. Strength  4. Endurance Cognitive Skills Affected (resulting in the inability to perform in a timely and safe manner):  1. WFLs Psychosocial Skills Affected:  1. Active Use of Coping Strategies  2.  Environmental Adaptations   Number of elements that affect the Plan of Care: 3-5:  MODERATE COMPLEXITY   CLINICAL DECISION MAKIN \[facility]\"" [address] [address] AM-PAC 6 Clicks   Basic Mobility Inpatient Short Form  How much help from another person does the patient currently need. .. Total A Lot A Little None   1. Putting on and taking off regular lower body clothing?   [ ] 1   [ ] 2   [ ] 3   [X] 4   2. Bathing (including washing, rinsing, drying)? [ ] 1   [ ] 2   [X] 3   [ ] 4   3. Toileting, which includes using toilet, bedpan or urinal?   [ ] 1   [ ] 2   [X] 3   [ ] 4   4. Putting on and taking off regular upper body clothing?   [ ] 1   [ ] 2   [ ] 3   [X] 4   5. Taking care of personal grooming such as brushing teeth? [ ] 1   [ ] 2   [ ] 3   [X] 4   6. Eating meals? [ ] 1   [ ] 2   [ ] 3   [X] 4   © 2007, Trustees of [address] [place] [id number], under license to [employer]. All rights reserved    Score:  Initial: 22 Most Recent: X (Date: -- )     Interpretation of Tool:  Represents activities that are increasingly more difficult (i.e. Bed mobility, Transfers, Gait).        Score 24 23 22-20 19-15 14-10 9-7 6       Modifier CH CI CJ CK CL CM CN         · Self Care:               - CURRENT STATUS:    CJ - 20%-39% impaired, limited or restricted               - GOAL STATUS:           CJ - 20%-39% impaired, limited or restricted               - D/C STATUS:                       CJ - 20%-39% impaired, limited or restricted  [place] / Product Type: Medicare /           Use of outcome tool(s) and clinical judgement create a POC that gives a: LOW COMPLEXITY             TREATMENT:   (In addition to Assessment/Re-Assessment sessions the following treatments were rendered)      Pre-treatment Symptoms/Complaints:    Pain: Initial:   Pain Intensity 1: 0  Post Session:  0      Assessment/Reassessment only, no treatment provided today  Discharge     Braces/Orthotics/Lines/Etc:   · O2 Device: Room air  Treatment/Session Assessment:    · Response to Treatment:  Discharge  · Interdisciplinary Collaboration:  · Physical Therapist  · Occupational Therapist  · Registered Nurse  ·   · After treatment position/precautions:  · Up in chair  · Bed/Chair-wheels locked  · Call light within reach  · RN notified     Total Treatment Duration:  OT Patient Time In/Time Out  Time In: 0957  Time Out: 1700 [facility] BLAIR Lerma/L

## 2017-02-20 NOTE — PROGRESS NOTES
Discharge instructions and prescriptions given and reviewed with pt, verbalizes understanding, medication side effect sheet reviewed with pt, pt to be discharged home, ride will be here at 4 pm.

## 2017-02-20 NOTE — INTERDISCIPLINARY ROUNDS
Interdisciplinary team rounds were held 2/20/2017 with the following team members:Care Management, Nursing, Pastoral Care, Pharmacy and Physician. Plan of care discussed. See clinical pathway and/or care plan for interventions and desired outcomes. Anticipating discharge to home with home health today.

## 2017-02-20 NOTE — DISCHARGE SUMMARY
Hospitalist Discharge Summary     Patient ID:  Sindy Contreras  [id number]  42 y.o.  1/5/1932  Admit date: 2/17/2017  4:20 PM  Discharge date and time: 2/20/2017  Attending: Bhavesh Longoria MD  PCP:  Riki Mohr MD  Treatment Team: Attending Provider: Bhavesh Longoria MD; Utilization Review: Inocencia Rutledge RN; Care Manager: LUZ Evans    Principal Diagnosis Acute cystitis without hematuria   Hospital Problems as of 2/20/2017  Date Reviewed: 7/28/2016          Codes Class Noted - Resolved POA    * (Principal)Acute cystitis without hematuria ICD-10-CM: N30.00  ICD-9-CM: 595.0  2/18/2017 - Present Yes        DNR (do not resuscitate) (Chronic) ICD-10-CM: Z66  ICD-9-CM: V49.86  2/18/2017 - Present Yes        Lewy body dementia (Chronic) ICD-10-CM: Y73.02, F02.80  ICD-9-CM: 331.82  2/17/2017 - Present Yes        CKD (chronic kidney disease) stage 3, GFR 30-59 ml/min (Chronic) ICD-10-CM: N18.3  ICD-9-CM: 585.3  2/17/2017 - Present Yes        Diastolic dysfunction (Chronic) ICD-10-CM: I51.9  ICD-9-CM: 429.9  2/17/2017 - Present Yes        Coronary atherosclerosis of native coronary artery (Chronic) ICD-10-CM: I25.10  ICD-9-CM: 414.01  3/16/2013 - Present Yes        Essential hypertension, benign (Chronic) ICD-10-CM: I10  ICD-9-CM: 401.1  8/30/2012 - Present Yes        Dyslipidemia (Chronic) ICD-10-CM: E78.5  ICD-9-CM: 272.4  8/30/2012 - Present Yes        Spinal stenosis (Chronic) ICD-10-CM: M48.00  ICD-9-CM: 724.00  8/30/2012 - Present Yes        Chronic pain- chronic narcotic use for spinal stenosis (Chronic) ICD-10-CM: K43.54  ICD-9-CM: 338.29  8/30/2012 - Present Yes        RESOLVED: Hypoxia ICD-10-CM: R09.02  ICD-9-CM: 799.02  2/17/2017 - 2/18/2017 Yes        RESOLVED: Encephalopathy due to infection ICD-10-CM: G93.49, B99.9  ICD-9-CM: 348.39, 136.9  2/17/2017 - 2/19/2017 Yes        RESOLVED: Hypotension ICD-10-CM: I95.9  ICD-9-CM: 458.9  2/17/2017 - 2/18/2017 Yes        RESOLVED: Sepsis due to Escherichia coli [facility]) ICD-10-CM: A41.51  ICD-9-CM: 038.42, 995.91  2/12/2014 - 2/18/2017 Yes              HPI: 80 y.o. female that presented to the ED with a 1 week history of dysuria and was started on empiric treatment with Macrobid as OP. She reports dysuria has improved but has 2 day history of fever, chills, confusion and N/V. She was seen by her PCP today and noted to have BP of 60/47 and subsequently sent to the ED. She denies difficulty breathing. She complains of arthritis pain in her left knee and hips, no other pain. No diarrhea. In the ED, UA with 3-5 WBC and 4+ bacteria, CXR detailed below. She is hypoxic on room air with O2 sat in the mid 80's, 94% on 3L NC. She has chronic cough and has been coughing more recently while eating per family. Hospital Course: Admitted on 2/17 for UTI. Found to be hypotense on admission. Responded to IVF. Initially hypoxic on 2-3L. Improved with diuresis. Now satting well on RA. Will be discharged home with [facility]; to complete 7d course of abx with vantin. Close followup with PCP    Significant Diagnostic Studies:   Labs: Results:       Chemistry Recent Labs      02/19/17   0536  02/18/17   0600  02/17/17   1630   GLU  92  78  116*   NA  136  138  133*   K  3.5  3.8  4.5   CL  100  104  96*   CO2  26  26  27   BUN  16  22  26*   CREA  0.94  1.09*  1.30*   CA  9.0  8.7  9.1   AGAP  10  8  10   AP   --    --   114   TP   --    --   7.1   ALB   --    --   3.1*   GLOB   --    --   4.0*   AGRAT   --    --   0.8*      CBC w/Diff Recent Labs      02/18/17   0600  02/17/17   1630   WBC  10.9  16.8*   RBC  2.95*  3.38*   HGB  8.9*  10.3*   HCT  27.7*  31.6*   PLT  234  260   GRANS  81*  91*   LYMPH  9*  3*   EOS  5  3      Cardiac Enzymes No results for input(s): CPK, CKND1, CAPO in the last 72 hours. No lab exists for component: CKRMB, TROIP   Coagulation No results for input(s): PTP, INR, APTT in the last 72 hours.     No lab exists for component: INREXT, INREXT Lipid Panel No results found for: CHOL, CHOLPOCT, CHOLX, CHLST, CHOLV, [id number], HDL, LDL, NLDLCT, DLDL, LDLC, DLDLP, [id number], VLDLC, VLDL, TGL, TGLX, TRIGL, [id number], TRIGP, TGLPOCT, [id number], CHHD, CHHDX   BNP No results for input(s): BNPP in the last 72 hours. Liver Enzymes Recent Labs      02/17/17   1630   TP  7.1   ALB  3.1*   AP  114   SGOT  26      Thyroid Studies Lab Results   Component Value Date/Time    TSH 1.200 03/17/2013 05:39 AM            Discharge Exam:  Visit Vitals    /79 (BP 1 Location: Left arm, BP Patient Position: Sitting)    Pulse 69    Temp 98.3 °F (36.8 °C)    Resp 17    Ht 4' 11\" (1.499 m)    Wt 62.1 kg (137 lb)    SpO2 97%    BMI 27.67 kg/m2     General appearance: alert, cooperative, NAD, sitting in chair eating lunch  Lungs: clear to auscultation, normal resp effort  Heart: RRR  Abdomen: soft, NTTP, +BS  Extremities: no cyanosis or edema  Neurologic: Grossly normal    Disposition: home with [facility]  Discharge Condition: stable  Patient Instructions:   Current Discharge Medication List      START taking these medications    Details   cefpodoxime (VANTIN) 200 mg tablet Take 1 Tab by mouth every twelve (12) hours for 3 days. Qty: 6 Tab, Refills: 0         CONTINUE these medications which have NOT CHANGED    Details   acetaminophen (TYLENOL) 500 mg tablet Take 500 mg by mouth every six (6) hours as needed for Pain. Indications: Pain      cyanocobalamin 1,000 mcg tablet Take 1,000 mcg by mouth daily. Indications: PREVENTION OF VITAMIN B12 DEFICIENCY      diclofenac (VOLTAREN) 1 % gel Apply 2 g to affected area four (4) times daily. Indications: OSTEOARTHRITIS      docusate sodium (COLACE) 100 mg capsule Take 100 mg by mouth three (3) times daily. Indications: Constipation      DULoxetine (CYMBALTA) 60 mg capsule Take 60 mg by mouth nightly. esomeprazole (NEXIUM) 40 mg capsule Take  by mouth daily. B.infantis-B.ani-B.long-B.bifi (PROBIOTIC 4X) 10-15 mg TbEC Take  by mouth. rosuvastatin (CRESTOR) 5 mg tablet Take 5 mg by mouth every Monday, Wednesday, Friday. oxybutynin (DITROPAN) 5 mg tablet Take 5 mg by mouth two (2) times a day. ferrous sulfate (IRON) 325 mg (65 mg iron) EC tablet Take 1 Tab by mouth two (2) times a day. Qty: 60 Tab, Refills: 0      aspirin 81 mg chewable tablet Take 1 Tab by mouth daily. Resume Aspirin on March 3rd (  Will complete 7 days off   ASA )  Qty: 30 Tab, Refills: 0      carvedilol (COREG) 3.125 mg tablet Take 1 Tab by mouth two (2) times daily (with meals). Qty: 60 Tab, Refills: 0      lisinopril (PRINIVIL, ZESTRIL) 20 mg tablet Take 1.5 Tabs by mouth daily. Qty: 45 Tab, Refills: 0      rOPINIRole (REQUIP) 5 mg tablet Take 0.5 mg by mouth nightly. ondansetron (ZOFRAN ODT) 4 mg disintegrating tablet Take 1 Tab by mouth every eight (8) hours as needed for Nausea. Qty: 10 Tab, Refills: 0      nitroglycerin (NITROSTAT) 0.4 mg SL tablet 1 Tab by SubLINGual route every five (5) minutes as needed for Chest Pain. Qty: 1 Bottle, Refills: 3      FAMOTIDINE/CA CARB/MAG HYDROX (PEPCID COMPLETE PO) Take  by mouth. furosemide (LASIX) 20 mg tablet Take 1 Tab by mouth daily as needed. Qty: 15 Tab, Refills: 5      potassium chloride (K-DUR, KLOR-CON) 10 mEq tablet Take 1 Tab by mouth daily as needed. Only take this med on days when she  takes Lasix the two go together  Qty: 15 Tab, Refills: 5      HYDROcodone-acetaminophen (NORCO)  mg tablet Take 1 Tab by mouth every six (6) hours as needed for Pain. Max Daily Amount: 4 Tabs. Qty: 20 Tab, Refills: 0      traMADol (ULTRAM) 50 mg tablet Take 1 Tab by mouth every six (6) hours as needed. Max Daily Amount: 200 mg. Qty: 90 Tab, Refills: 0      oxyCODONE IR (ROXICODONE) 5 mg immediate release tablet Take 1 Tab by mouth every four (4) hours as needed for Pain.  Max Daily Amount: 30 mg.  Qty: 90 Tab, Refills: 0      lactase (LACTAID) 3,000 unit tablet Take 1 Tab by mouth three (3) times daily (with meals). Take if patient will eat dairy products  Qty: 90 Tab, Refills: 0      simethicone (MYLICON) 80 mg chewable tablet Take 1 Tab by mouth Before breakfast, lunch, and dinner. Qty: 90 Tab, Refills: 0      pantoprazole (PROTONIX) 40 mg tablet Take 1 Tab by mouth two (2) times a day. Qty: 60 Tab, Refills: 6      sertraline (ZOLOFT) 100 mg tablet Take 100 mg by mouth daily.          STOP taking these medications       ferrous sulfate 325 mg (65 mg iron) tablet Comments:   Reason for Stopping:               Activity: PT/OT per Home Health  Diet: Cardiac Diet    Follow-up  -   PCP in 1 week    Signed:  Dell Leal MD  2/20/2017  1:15 PM

## 2017-02-20 NOTE — DISCHARGE INSTRUCTIONS
DISCHARGE SUMMARY from Nurse    The following personal items are in your possession at time of discharge:    Dental Appliances: Partials  Visual Aid: Glasses     Home Medications: None  Jewelry: With patient  Clothing: With patient  Other Valuables: None             PATIENT INSTRUCTIONS:    After general anesthesia or intravenous sedation, for 24 hours or while taking prescription Narcotics:  · Limit your activities  · Do not drive and operate hazardous machinery  · Do not make important personal or business decisions  · Do  not drink alcoholic beverages  · If you have not urinated within 8 hours after discharge, please contact your surgeon on call. Report the following to your surgeon:  · Excessive pain, swelling, redness or odor of or around the surgical area  · Temperature over 100.5  · Nausea and vomiting lasting longer than 4 hours or if unable to take medications  · Any signs of decreased circulation or nerve impairment to extremity: change in color, persistent  numbness, tingling, coldness or increase pain  · Any questions        What to do at Home:  Recommended activity: Activity as tolerated    If you experience any of the following symptoms fever greater than 101, nausea/vomitng, or difficulty urinating, please follow up with your primary care physician. *  Please give a list of your current medications to your Primary Care Provider. *  Please update this list whenever your medications are discontinued, doses are      changed, or new medications (including over-the-counter products) are added. *  Please carry medication information at all times in case of emergency situations. These are general instructions for a healthy lifestyle:    No smoking/ No tobacco products/ Avoid exposure to second hand smoke    Surgeon General's Warning:  Quitting smoking now greatly reduces serious risk to your health.     Obesity, smoking, and sedentary lifestyle greatly increases your risk for illness    A healthy diet, regular physical exercise & weight monitoring are important for maintaining a healthy lifestyle    You may be retaining fluid if you have a history of heart failure or if you experience any of the following symptoms:  Weight gain of 3 pounds or more overnight or 5 pounds in a week, increased swelling in our hands or feet or shortness of breath while lying flat in bed. Please call your doctor as soon as you notice any of these symptoms; do not wait until your next office visit. Recognize signs and symptoms of STROKE:    F-face looks uneven    A-arms unable to move or move unevenly    S-speech slurred or non-existent    T-time-call 911 as soon as signs and symptoms begin-DO NOT go       Back to bed or wait to see if you get better-TIME IS BRAIN. Warning Signs of HEART ATTACK     Call 911 if you have these symptoms:   Chest discomfort. Most heart attacks involve discomfort in the center of the chest that lasts more than a few minutes, or that goes away and comes back. It can feel like uncomfortable pressure, squeezing, fullness, or pain.  Discomfort in other areas of the upper body. Symptoms can include pain or discomfort in one or both arms, the back, neck, jaw, or stomach.  Shortness of breath with or without chest discomfort.  Other signs may include breaking out in a cold sweat, nausea, or lightheadedness. Don't wait more than five minutes to call 911 - MINUTES MATTER! Fast action can save your life. Calling 911 is almost always the fastest way to get lifesaving treatment. Emergency Medical Services staff can begin treatment when they arrive -- up to an hour sooner than if someone gets to the hospital by car. The discharge information has been reviewed with the patient. The patient verbalized understanding. Discharge medications reviewed with the patient and appropriate educational materials and side effects teaching were provided.                Urinary Tract Infection in Women: Care Instructions  Your Care Instructions    A urinary tract infection, or UTI, is a general term for an infection anywhere between the kidneys and the urethra (where urine comes out). Most UTIs are bladder infections. They often cause pain or burning when you urinate. UTIs are caused by bacteria and can be cured with antibiotics. Be sure to complete your treatment so that the infection goes away. Follow-up care is a key part of your treatment and safety. Be sure to make and go to all appointments, and call your doctor if you are having problems. It's also a good idea to know your test results and keep a list of the medicines you take. How can you care for yourself at home? · Take your antibiotics as directed. Do not stop taking them just because you feel better. You need to take the full course of antibiotics. · Drink extra water and other fluids for the next day or two. This may help wash out the bacteria that are causing the infection. (If you have kidney, heart, or liver disease and have to limit fluids, talk with your doctor before you increase your fluid intake.)  · Avoid drinks that are carbonated or have caffeine. They can irritate the bladder. · Urinate often. Try to empty your bladder each time. · To relieve pain, take a hot bath or lay a heating pad set on low over your lower belly or genital area. Never go to sleep with a heating pad in place. To prevent UTIs  · Drink plenty of water each day. This helps you urinate often, which clears bacteria from your system. (If you have kidney, heart, or liver disease and have to limit fluids, talk with your doctor before you increase your fluid intake.)  · Consider adding cranberry juice to your diet. · Urinate when you need to. · Urinate right after you have sex. · Change sanitary pads often. · Avoid douches, bubble baths, feminine hygiene sprays, and other feminine hygiene products that have deodorants.   · After going to the bathroom, wipe from front to back. When should you call for help? Call your doctor now or seek immediate medical care if:  · Symptoms such as fever, chills, nausea, or vomiting get worse or appear for the first time. · You have new pain in your back just below your rib cage. This is called flank pain. · There is new blood or pus in your urine. · You have any problems with your antibiotic medicine. Watch closely for changes in your health, and be sure to contact your doctor if:  · You are not getting better after taking an antibiotic for 2 days. · Your symptoms go away but then come back. Where can you learn more? Go to http://steven-prachi.info/. Enter D879 in the search box to learn more about \"Urinary Tract Infection in Women: Care Instructions. \"  Current as of: August 12, 2016  Content Version: 11.1  © 4160-2211 [employer]. Care instructions adapted under license by [employer] (which disclaims liability or warranty for this information). If you have questions about a medical condition or this instruction, always ask your healthcare professional. [address] any warranty or liability for your use of this information.

## 2017-02-20 NOTE — PROGRESS NOTES
Problem: Mobility Impaired (Adult and Pediatric)  Goal: *Acute Goals and Plan of Care (Insert Text)  LTG:  (1.)Ms. Jadon Frausto will move from supine to sit and sit to supine , scoot up and down and roll side to side in bed with MODIFIED INDEPENDENCE within 7 day(s). (2.)Ms. Jadon Frausto will transfer from bed to chair and chair to bed with MODIFIED INDEPENDENCE using the least restrictive device within 7 day(s). (3.)Ms. Jadon Frausto will ambulate with MODIFIED INDEPENDENCE for 100 feet with the least restrictive device within 7 day(s). ________________________________________________________________________________________________      PHYSICAL THERAPY: Daily Note, Treatment Day: 1st and AM 2/20/2017  INPATIENT: Hospital Day: 4  [place] / [place] / Product Type: Medicare /      NAME/AGE/GENDER: Brendan Rueda is a 80 y.o. female           PRIMARY DIAGNOSIS: Sepsis ([facility] [address] [facility] Acute cystitis without hematuria Acute cystitis without hematuria        ICD-10: Treatment Diagnosis:       · Generalized Muscle Weakness (M62.81)  · Other lack of cordination (R27.8)  · Difficulty in walking, Not elsewhere classified (R26.2)  · Other abnormalities of gait and mobility (R26.89)   Precaution/Allergies:  Morphine; Other medication; and Sulfa (sulfonamide antibiotics)       ASSESSMENT:      Ms. Jadon Frausto is an 79 YO F admitted to the hospital with nausea, vomiting, increased confusion, and hypotension. Pt is supine in bed upon arrival and agreeable to PT assessment. A&O x4 and very pleasant, eager to know when she can go home. C/O minimal R shoulder pain d/t previous RCT. Pt sits to EOB with supervision and stands with SBA. AMB 15' with RW in/out of bathroom using good safety awareness and verbalizes planning process prior to mobility. CGA used d/t new movement of sideways walking to navigate bathroom space. Able to perform pericare with setup.  Returned to chair, again demonstrating good safety mechanics and walker management. Pt states, \"I've fallen before so I don't rush when I do anything. \" Posey alarm activated and all needs met. Advised to call for help prior to getting up. Pt will continue to benefit from skilled PT to improve strength, balance, and activity tolerance for functional activity. Pt would benefit from home health PT at D/C. This section established at most recent assessment   PROBLEM LIST (Impairments causing functional limitations):  1. Decreased Strength  2. Decreased ADL/Functional Activities  3. Decreased Transfer Abilities  4. Decreased Ambulation Ability/Technique  5. Decreased Balance  6. Increased Pain  7. Decreased Activity Tolerance    INTERVENTIONS PLANNED: (Benefits and precautions of physical therapy have been discussed with the patient.)  1. Balance Exercise  2. Bed Mobility  3. Family Education  4. Gait Training  5. Therapeutic Activites  6. Therapeutic Exercise/Strengthening  7. Transfer Training  8. Group Therapy      TREATMENT PLAN: Frequency/Duration: 3 times a week for duration of hospital stay  Rehabilitation Potential For Stated Goals: GOOD      RECOMMENDED REHABILITATION/EQUIPMENT: (at time of discharge pending progress): Continue Skilled Therapy and Home Health: Physical Therapy. HISTORY:   History of Present Injury/Illness (Reason for Referral):  Per H&P, \"Veronica Brito is a 80 y.o. female that presented to the ED with a 1 week history of dysuria and was started on empiric treatment with Macrobid as OP. She reports dysuria has improved but has 2 day history of fever, chills, confusion and N/V. She was seen by her PCP today and noted to have BP of 60/47 and subsequently sent to the ED. She denies difficulty breathing. She complains of arthritis pain in her left knee and hips, no other pain. No diarrhea. In the ED, UA with 3-5 WBC and 4+ bacteria, CXR detailed below. She is hypoxic on room air with O2 sat in the mid 80's, 94% on 3L NC.  She has chronic cough and has been coughing more recently while eating per family. \"  Past Medical History/Comorbidities:   Ms. Micaela Gaitan  has a past medical history of Acute myocardial infarction of other anterior wall, initial episode of care [facility]) (8/30/2012); Acute systolic congestive heart failure- in setting of anterior MI, EF 35-40% (8/30/2012); Acute systolic heart failure ([address] ARF (acute renal failure) ([address] (9/29/2012); Arthritis; CAD (coronary artery disease); Carotid artery stenosis without cerebral infarction; Chronic pain- chronic narcotic use for spinal stenosis (8/30/2012); Coronary atherosclerosis of native coronary artery (3/16/2013); Diarrhea (10/2/2012); Dyslipidemia (8/30/2012); Dyspnea; Essential hypertension, benign (8/30/2012); Heart failure ([address] Hyperlipidemia; Hypertension; Ischemic colitis ([facility] [address] Lower GI bleed (3/12/2015); Myocardial infarction, anterolateral wall, subsequent care ([address] N&V (nausea and vomiting) (3/16/2013); Sepsis ([address] (2/12/2014); Spinal stenosis (8/30/2012); ST elevation (STEMI) myocardial infarction involving left anterior descending coronary artery ([address] and Syncope and collapse (2/12/2014). She also has no past medical history of Unspecified sleep apnea. Ms. Micaela Gaitan  has a past surgical history that includes cholecystectomy; gyn; appendectomy; orthopaedic; other surgical; coronary stent placement; and cardiac surg procedure unlist.  Social History/Living Environment:   Home Environment: Private residence  # Steps to Enter: 0  Wheelchair Ramp: Yes  One/Two Story Residence: One story  Living Alone: No  Support Systems: Family member(s)  Patient Expects to be Discharged to[de-identified] Private residence  Current DME Used/Available at Home: Wheelchair, Elwanda Ghada, rolling  Prior Level of Function/Work/Activity:  Ms. Micaela Gaitan reports she primarily used [facility] for mobility and was able to transfer from bed<>WC without assistance. Has caregivers 2x week for bathing and home health PT/OT 3x week.  DIL prepares meals. States she will be going to adult  soon 2x week. Number of Personal Factors/Comorbidities that affect the Plan of Care:  Alone during the day  Chronic L knee pain 1-2: MODERATE COMPLEXITY   EXAMINATION:   Most Recent Physical Functioning:   Gross Assessment:  AROM: Within functional limits  Strength: Generally decreased, functional  Sensation: Intact               Posture:  Posture (WDL): Exceptions to WDL  Posture Assessment: Forward head, Rounded shoulders, Trunk flexion  Balance:  Sitting: Intact  Sitting - Static: Good (unsupported); Prop sitting  Sitting - Dynamic: Supported sitting  Standing: Impaired  Standing - Static: Fair  Standing - Dynamic : Fair Bed Mobility:  Rolling: Supervision  Supine to Sit: Supervision  Wheelchair Mobility:     Transfers:  Sit to Stand: Stand-by asssistance  Stand to Sit: Stand-by asssistance  Bed to Chair: Stand-by asssistance  Gait:     Base of Support: Widened  Speed/Mariel: Slow  Step Length: Left shortened;Right shortened  Distance (ft): 15 Feet (ft)  Ambulation - Level of Assistance: Contact guard assistance       Body Structures Involved:  1. Joints  2. Muscles Body Functions Affected:  1. Sensory/Pain  2. Neuromusculoskeletal  3. Movement Related Activities and Participation Affected:  1. General Tasks and Demands  2. Mobility  3. Self Care  4. Domestic Life   Number of elements that affect the Plan of Care: 4+: HIGH COMPLEXITY   CLINICAL PRESENTATION:   Presentation: Stable and uncomplicated: LOW COMPLEXITY   CLINICAL DECISION MAKIN \[facility]\"" [id number] AM-PAC 6 Clicks   Basic Mobility Inpatient Short Form  How much difficulty does the patient currently have. .. Unable A Lot A Little None   1. Turning over in bed (including adjusting bedclothes, sheets and blankets)? [ ] 1   [ ] 2   [ ] 3   [X] 4   2. Sitting down on and standing up from a chair with arms ( e.g., wheelchair, bedside commode, etc.)   [ ] 1   [ ] 2   [X] 3   [ ] 4   3. Moving from lying on back to sitting on the side of the bed? [ ] 1   [ ] 2   [ ] 3   [X] 4   How much help from another person does the patient currently need. .. Total A Lot A Little None   4. Moving to and from a bed to a chair (including a wheelchair)? [ ] 1   [ ] 2   [X] 3   [ ] 4   5. Need to walk in hospital room? [ ] 1   [X] 2   [ ] 3   [ ] 4   6. Climbing 3-5 steps with a railing? [X] 1   [ ] 2   [ ] 3   [ ] 4   © 2007, Trustees of [facility] [employer], under license to [employer]. All rights reserved    Score:  Initial: 17 Most Recent: X (Date: 2/18/17)     Interpretation of Tool:  Represents activities that are increasingly more difficult (i.e. Bed mobility, Transfers, Gait). Score 24 23 22-20 19-15 14-10 9-7 6       Modifier CH CI CJ CK CL CM CN         · Mobility - Walking and Moving Around:               - CURRENT STATUS:    CK - 40%-59% impaired, limited or restricted               - GOAL STATUS:           CJ - 20%-39% impaired, limited or restricted               - D/C STATUS:                       ---------------To be determined---------------  [place] / [place] / Product Type: Medicare /       Medical Necessity:     · Patient is expected to demonstrate progress in strength, balance and functional technique to improve safety during functional mobility. Reason for Services/Other Comments:  · Patient continues to require present interventions due to patient's inability to complete functional mobility safely and efficiently. Use of outcome tool(s) and clinical judgement create a POC that gives a: Clear prediction of patient's progress: LOW COMPLEXITY                 TREATMENT:   (In addition to Assessment/Re-Assessment sessions the following treatments were rendered)   Pre-treatment Symptoms/Complaints: \"Are you the one who says I can go home? \"  Pain: Initial:   Pain Intensity 1: 2  Pain Location 1: Shoulder  Pain Orientation 1: Right Post Session:  2/10      Therapeutic Activity: (    20 minutes): Therapeutic activities including Bed transfers, Chair transfers, Toilet transfers and Ambulation on level ground to improve mobility, strength and balance. Required MIN verbal cues   to promote dynamic balance in standing for planning/navigation of bathroom. Braces/Orthotics/Lines/Etc:   · None  Treatment/Session Assessment:    · Response to Treatment:  Pt demonstrates good safety awareness and able to increase functional activity and AMB distance today   · Interdisciplinary Collaboration:  · Physical Therapist, Registered Nurse and   · After treatment position/precautions:  · Up in chair, Bed alarm/tab alert on, Bed/Chair-wheels locked and Call light within reach  · Compliance with Program/Exercises: Will assess as treatment progresses. · Recommendations/Intent for next treatment session: \"Next visit will focus on advancements to more challenging activities and reduction in assistance provided\".   Total Treatment Duration:  PT Patient Time In/Time Out  Time In: 1007  Time Out: 1440 [facility]

## 2017-02-20 NOTE — PROGRESS NOTES
Pt confused at times. inquiring if this was her bed, and if this was her room. After instructed that it was, pt resting with out distress. Brief changed and SCD,s on.

## 2017-02-20 NOTE — PROGRESS NOTES
Care Management Interventions  PCP Verified by CM: Yes  Transition of Care Consult (CM Consult): [address]: No  Reason Outside [place]: Patient already serviced by other home care/hospice agency  Discharge Durable Medical Equipment: No  Physical Therapy Consult: Yes  Current Support Network: Own Home, Relative's Home (Lives with working daughter)  Confirm Follow Up Transport: Family  Plan discussed with Pt/Family/Caregiver: Yes  Freedom of Choice Offered: Yes  Discharge Location  Discharge Placement: Home     Spoke with daughter Domonique Nolasco this am and she was afraid to have her mother go home because she will be alone for most of the day. Referred and accepted to [address]. Called daughter to let her know and she now states she will take her mother home because when she goes to skilled her confusion increases and they \"change her meds all around\". Made sure daughter was sure of the plan. Will update MD.  Plan: DC today with [facility] and I told daughter I would give her a copy of the insurance card. Cassandria Lacer Jimmey Gowers

## 2017-02-20 NOTE — PROGRESS NOTES
PIV lost.pt reports she is being discharged and wants RN to confirm prior to re-starting PIV. Will comply and f/u with PCP.

## 2017-02-21 NOTE — PROGRESS NOTES
Call from [facility] and spoke with  in [facility]. Emilee Vasquez. She reports patient's local  has resigned mud February and that patient will be assigned a new worker March 1st. In the meantime, Ms. Vasquez will call daughter and arrange services in the home. Patient eligible for an aid and incontinent supplis. They will see about hand held shower and assess for other needs. Patient has [facility] in place. Penelope Jung

## 2017-02-22 LAB
BACTERIA SPEC CULT: NORMAL
BACTERIA SPEC CULT: NORMAL
SERVICE CMNT-IMP: NORMAL
SERVICE CMNT-IMP: NORMAL

## 2017-02-27 ENCOUNTER — APPOINTMENT (OUTPATIENT)
Dept: CT IMAGING | Age: 82
DRG: 189 | End: 2017-02-27
Attending: EMERGENCY MEDICINE
Payer: MEDICARE

## 2017-02-27 ENCOUNTER — HOSPITAL ENCOUNTER (INPATIENT)
Age: 82
LOS: 1 days | Discharge: HOME HEALTH | DRG: 189 | End: 2017-03-01
Attending: EMERGENCY MEDICINE | Admitting: INTERNAL MEDICINE
Payer: MEDICARE

## 2017-02-27 ENCOUNTER — APPOINTMENT (OUTPATIENT)
Dept: GENERAL RADIOLOGY | Age: 82
DRG: 189 | End: 2017-02-27
Attending: EMERGENCY MEDICINE
Payer: MEDICARE

## 2017-02-27 DIAGNOSIS — R09.02 HYPOXIA: Primary | ICD-10-CM

## 2017-02-27 LAB
ALBUMIN SERPL BCP-MCNC: 3.6 G/DL (ref 3.2–4.6)
ALBUMIN/GLOB SERPL: 0.9 {RATIO} (ref 1.2–3.5)
ALP SERPL-CCNC: 97 U/L (ref 50–136)
ALT SERPL-CCNC: 16 U/L (ref 12–65)
ANION GAP BLD CALC-SCNC: 7 MMOL/L (ref 7–16)
ARTERIAL PATENCY WRIST A: POSITIVE
AST SERPL W P-5'-P-CCNC: 20 U/L (ref 15–37)
ATRIAL RATE: 220 BPM
BASE DEFICIT BLDA-SCNC: 0.4 MMOL/L (ref 0–2)
BASOPHILS # BLD AUTO: 0 K/UL (ref 0–0.2)
BASOPHILS # BLD: 0 % (ref 0–2)
BDY SITE: ABNORMAL
BILIRUB SERPL-MCNC: 0.3 MG/DL (ref 0.2–1.1)
BNP SERPL-MCNC: 60 PG/ML
BUN SERPL-MCNC: 18 MG/DL (ref 8–23)
CALCIUM SERPL-MCNC: 9.7 MG/DL (ref 8.3–10.4)
CALCULATED P AXIS, ECG09: 74 DEGREES
CALCULATED R AXIS, ECG10: 93 DEGREES
CALCULATED T AXIS, ECG11: 73 DEGREES
CHLORIDE SERPL-SCNC: 103 MMOL/L (ref 98–107)
CO2 SERPL-SCNC: 27 MMOL/L (ref 21–32)
COHGB MFR BLD: 0.3 % (ref 0.5–1.5)
CREAT SERPL-MCNC: 1.03 MG/DL (ref 0.6–1)
DIAGNOSIS, 93000: NORMAL
DIASTOLIC BP, ECG02: NORMAL MMHG
DIFFERENTIAL METHOD BLD: ABNORMAL
DO-HGB BLD-MCNC: 9 % (ref 0–5)
EOSINOPHIL # BLD: 0.1 K/UL (ref 0–0.8)
EOSINOPHIL NFR BLD: 1 % (ref 0.5–7.8)
ERYTHROCYTE [DISTWIDTH] IN BLOOD BY AUTOMATED COUNT: 12.7 % (ref 11.9–14.6)
FIO2 ON VENT: 21 %
GLOBULIN SER CALC-MCNC: 4.1 G/DL (ref 2.3–3.5)
GLUCOSE SERPL-MCNC: 82 MG/DL (ref 65–100)
HCO3 BLDA-SCNC: 23 MMOL/L (ref 22–26)
HCT VFR BLD AUTO: 36.4 % (ref 35.8–46.3)
HGB BLD-MCNC: 12 G/DL (ref 11.7–15.4)
HGB BLDMV-MCNC: 11.8 GM/DL (ref 11.7–15)
IMM GRANULOCYTES # BLD: 0 K/UL (ref 0–0.5)
IMM GRANULOCYTES NFR BLD AUTO: 0.2 % (ref 0–5)
LACTATE BLD-SCNC: 1.2 MMOL/L (ref 0.5–1.9)
LYMPHOCYTES # BLD AUTO: 5 % (ref 13–44)
LYMPHOCYTES # BLD: 0.6 K/UL (ref 0.5–4.6)
MCH RBC QN AUTO: 30.8 PG (ref 26.1–32.9)
MCHC RBC AUTO-ENTMCNC: 33 G/DL (ref 31.4–35)
MCV RBC AUTO: 93.6 FL (ref 79.6–97.8)
METHGB MFR BLD: 0.3 % (ref 0–1.5)
MONOCYTES # BLD: 0.2 K/UL (ref 0.1–1.3)
MONOCYTES NFR BLD AUTO: 2 % (ref 4–12)
NEUTS SEG # BLD: 11.6 K/UL (ref 1.7–8.2)
NEUTS SEG NFR BLD AUTO: 92 % (ref 43–78)
OXYHGB MFR BLDA: 90.7 % (ref 94–97)
P-R INTERVAL, ECG05: NORMAL MS
PCO2 BLDA: 32 MMHG (ref 35–45)
PH BLDA: 7.47 [PH] (ref 7.35–7.45)
PLATELET # BLD AUTO: 407 K/UL (ref 150–450)
PMV BLD AUTO: 9.6 FL (ref 10.8–14.1)
PO2 BLDA: 58 MMHG (ref 75–100)
POTASSIUM SERPL-SCNC: 4.4 MMOL/L (ref 3.5–5.1)
PROCALCITONIN SERPL-MCNC: 0.2 NG/ML
PROT SERPL-MCNC: 7.7 G/DL (ref 6.3–8.2)
Q-T INTERVAL, ECG07: 306 MS
QRS DURATION, ECG06: 78 MS
QTC CALCULATION (BEZET), ECG08: 402 MS
RBC # BLD AUTO: 3.89 M/UL (ref 4.05–5.25)
SAO2 % BLD: 91 % (ref 92–98.5)
SERVICE CMNT-IMP: ABNORMAL
SODIUM SERPL-SCNC: 137 MMOL/L (ref 136–145)
SYSTOLIC BP, ECG01: NORMAL MMHG
TROPONIN I BLD-MCNC: 0.01 NG/ML (ref 0–0.08)
VENTILATION MODE VENT: ABNORMAL
VENTRICULAR RATE, ECG03: 104 BPM
WBC # BLD AUTO: 12.5 K/UL (ref 4.3–11.1)

## 2017-02-27 PROCEDURE — 74011250637 HC RX REV CODE- 250/637: Performed by: EMERGENCY MEDICINE

## 2017-02-27 PROCEDURE — 71020 XR CHEST PA LAT: CPT

## 2017-02-27 PROCEDURE — 93005 ELECTROCARDIOGRAM TRACING: CPT | Performed by: EMERGENCY MEDICINE

## 2017-02-27 PROCEDURE — 83605 ASSAY OF LACTIC ACID: CPT

## 2017-02-27 PROCEDURE — 36600 WITHDRAWAL OF ARTERIAL BLOOD: CPT

## 2017-02-27 PROCEDURE — 74011250636 HC RX REV CODE- 250/636: Performed by: EMERGENCY MEDICINE

## 2017-02-27 PROCEDURE — 96374 THER/PROPH/DIAG INJ IV PUSH: CPT | Performed by: EMERGENCY MEDICINE

## 2017-02-27 PROCEDURE — 83880 ASSAY OF NATRIURETIC PEPTIDE: CPT | Performed by: EMERGENCY MEDICINE

## 2017-02-27 PROCEDURE — 71260 CT THORAX DX C+: CPT

## 2017-02-27 PROCEDURE — 84145 PROCALCITONIN (PCT): CPT | Performed by: EMERGENCY MEDICINE

## 2017-02-27 PROCEDURE — 82803 BLOOD GASES ANY COMBINATION: CPT

## 2017-02-27 PROCEDURE — 85025 COMPLETE CBC W/AUTO DIFF WBC: CPT | Performed by: EMERGENCY MEDICINE

## 2017-02-27 PROCEDURE — 80053 COMPREHEN METABOLIC PANEL: CPT | Performed by: EMERGENCY MEDICINE

## 2017-02-27 PROCEDURE — 99285 EMERGENCY DEPT VISIT HI MDM: CPT | Performed by: EMERGENCY MEDICINE

## 2017-02-27 PROCEDURE — 84484 ASSAY OF TROPONIN QUANT: CPT

## 2017-02-27 RX ORDER — SODIUM CHLORIDE 0.9 % (FLUSH) 0.9 %
5-10 SYRINGE (ML) INJECTION EVERY 8 HOURS
Status: DISCONTINUED | OUTPATIENT
Start: 2017-02-27 | End: 2017-02-28

## 2017-02-27 RX ORDER — SODIUM CHLORIDE 0.9 % (FLUSH) 0.9 %
10 SYRINGE (ML) INJECTION
Status: COMPLETED | OUTPATIENT
Start: 2017-02-27 | End: 2017-02-28

## 2017-02-27 RX ORDER — SODIUM CHLORIDE 0.9 % (FLUSH) 0.9 %
5-10 SYRINGE (ML) INJECTION AS NEEDED
Status: DISCONTINUED | OUTPATIENT
Start: 2017-02-27 | End: 2017-02-28

## 2017-02-27 RX ORDER — OXYCODONE HYDROCHLORIDE 5 MG/1
10 TABLET ORAL
Status: COMPLETED | OUTPATIENT
Start: 2017-02-27 | End: 2017-02-27

## 2017-02-27 RX ORDER — FUROSEMIDE 10 MG/ML
20 INJECTION INTRAMUSCULAR; INTRAVENOUS
Status: COMPLETED | OUTPATIENT
Start: 2017-02-27 | End: 2017-02-27

## 2017-02-27 RX ORDER — ROPINIROLE 0.5 MG/1
0.5 TABLET, FILM COATED ORAL ONCE
Status: COMPLETED | OUTPATIENT
Start: 2017-02-27 | End: 2017-02-27

## 2017-02-27 RX ADMIN — ROPINIROLE HYDROCHLORIDE 0.5 MG: 0.5 TABLET, FILM COATED ORAL at 23:58

## 2017-02-27 RX ADMIN — OXYCODONE HYDROCHLORIDE 10 MG: 5 TABLET ORAL at 22:48

## 2017-02-27 RX ADMIN — FUROSEMIDE 20 MG: 10 INJECTION, SOLUTION INTRAMUSCULAR; INTRAVENOUS at 21:25

## 2017-02-27 NOTE — IP AVS SNAPSHOT
Shannan Henning 
 
 
 [address] [facility] [address] [place] [address] 
[phone] Patient: Eli Irving MRN: [id number] RUM:[birth year]32 You are allergic to the following Allergen Reactions Morphine Nausea and Vomiting Other Medication Other (comments) \"Took steroids this week and made my face red. \"  
    
 Sulfa (Sulfonamide Antibiotics) Rash Recent Documentation Height  
  
  
  
  
  
 1.499 m Emergency Contacts Name Discharge Info Relation Home Work Mobile Soheila Jones  Other Relative [6] [phone] CHIQUITA Patrick  Child [2] [phone] Guillermina Bullock  Other Relative [6] [phone] About your hospitalization You were admitted on:  February 28, 2017 [place] [address] [facility] You were discharged on:  March 1, 2017 Unit phone number:  [phone] Why you were hospitalized Your primary diagnosis was:  Acute Hypoxemic Respiratory Failure (Hcc) Your diagnoses also included:  Dnr (Do Not Resuscitate), Essential Hypertension, Benign, Lewy Body Dementia, Chronic Diastolic (Congestive) Heart Failure (Hcc) Providers Seen During Your Hospitalizations Provider Role Specialty Primary office phone Beto Acuña MD Attending Provider Emergency Medicine [phone] Christos Brown MD Attending Provider Internal Medicine [phone] Yayo Lee DO Attending Provider Internal Medicine [phone] Your Primary Care Physician (PCP) Primary Care Physician Office Phone Office Fax 6[facility], [facility] [phone] Follow-up Information Follow up With Details Comments Contact Info Ángel March MD On 3/6/2017 at 11:40 [facility] [address] [place] [address] 
[phone] [address] Wing   [address] [address] [phone] Current Discharge Medication List  
  
CONTINUE these medications which have CHANGED Dose & Instructions Dispensing Information Comments Morning Noon Evening Bedtime  
 carvedilol 3.125 mg tablet Commonly known as:  Harjit Avitia What changed:   
- how much to take - Another medication with the same name was removed. Continue taking this medication, and follow the directions you see here. Your next dose is: Today Dose:  3.125 mg Take 1 Tab by mouth two (2) times daily (with meals). Quantity:  60 Tab Refills:  0  
     
   
   
  
   
  
 lisinopril 10 mg tablet Commonly known as:  Krista Mccarty What changed:  Another medication with the same name was removed. Continue taking this medication, and follow the directions you see here. Your next dose is:  Tomorrow Dose:  10 mg Take 10 mg by mouth daily. Refills:  0  
     
   
   
   
  
 oxyCODONE IR 5 mg immediate release tablet Commonly known as:  Kaiden Ross What changed:  additional instructions Your next dose is: Today Dose:  5 mg Take 1 Tab by mouth every four (4) hours as needed for Pain. Max Daily Amount: 30 mg.  
 Quantity:  90 Tab Refills:  0 CONTINUE these medications which have NOT CHANGED Dose & Instructions Dispensing Information Comments Morning Noon Evening Bedtime  
 acetaminophen 500 mg tablet Commonly known as:  TYLENOL Your next dose is: Today Dose:  500 mg Take 500 mg by mouth every six (6) hours as needed for Pain. Indications: Pain Refills:  0  
     
   
   
   
  
 aspirin 81 mg chewable tablet Your next dose is:  Tomorrow Dose:  81 mg Take 1 Tab by mouth daily. Resume Aspirin on March 3rd (  Will complete 7 days off   ASA ) Quantity:  30 Tab Refills:  0  
     
   
   
   
  
 COLACE 100 mg capsule Generic drug:  docusate sodium Your next dose is: Today  Dose:  100 mg  
 Take 100 mg by mouth three (3) times daily. Indications: Constipation Refills:  0  
     
   
   
  
   
  
 CRESTOR 5 mg tablet Generic drug:  rosuvastatin Notes to Patient:  Aliyah Ng Dose:  5 mg Take 5 mg by mouth every Monday, Wednesday, Friday. Refills:  0  
     
   
   
   
  
 cyanocobalamin 1,000 mcg tablet Your next dose is:  Tomorrow Dose:  1000 mcg Take 1,000 mcg by mouth daily. Indications: PREVENTION OF VITAMIN B12 DEFICIENCY Refills:  0  
     
   
   
   
  
 diclofenac 1 % Gel Commonly known as:  VOLTAREN Dose:  2 g Apply 2 g to affected area four (4) times daily. Indications: OSTEOARTHRITIS Refills:  0 DULoxetine 60 mg capsule Commonly known as:  CYMBALTA Your next dose is: Today Dose:  60 mg Take 60 mg by mouth nightly. Refills:  0  
     
   
   
   
  
  
 ferrous sulfate 325 mg (65 mg iron) EC tablet Commonly known as:  IRON Your next dose is: Today Dose:  325 mg Take 1 Tab by mouth two (2) times a day. Quantity:  60 Tab Refills:  0 NexIUM 40 mg capsule Generic drug:  esomeprazole Your next dose is:  Tomorrow Dose:  40 mg Take 40 mg by mouth daily. Refills:  0  
     
   
   
   
  
 nitroglycerin 0.4 mg SL tablet Commonly known as:  NITROSTAT Notes to Patient:  As needed for chest pain Dose:  0.4 mg  
1 Tab by SubLINGual route every five (5) minutes as needed for Chest Pain. Quantity:  1 Bottle Refills:  3  
     
   
   
   
  
 oxybutynin 5 mg tablet Commonly known as:  SJNDWIRJ Your next dose is: Today Dose:  5 mg Take 5 mg by mouth two (2) times a day. Refills:  0 PROBIOTIC 4X 10-15 mg Tbec Generic drug:  B.infantis-B.ani-B.long-B.bifi Your next dose is:  Tomorrow Dose:  1 Tab Take 1 Tab by mouth daily. Refills:  0  
     
   
   
   
  
 rOPINIRole 5 mg tablet Commonly known as:  Joel Erika Your next dose is: Today Dose:  5 mg Take 5 mg by mouth nightly. Refills:  0 ZOFRAN (AS HYDROCHLORIDE) 4 mg tablet Generic drug:  ondansetron hcl Notes to Patient:  As needed for nausea Dose:  4 mg Take 4 mg by mouth every eight (8) hours as needed for Nausea. Refills:  0 ASK your doctor about these medications Dose & Instructions Dispensing Information Comments Morning Noon Evening Bedtime  
 ondansetron 4 mg disintegrating tablet Commonly known as:  ZOFRAN ODT Dose:  4 mg Take 1 Tab by mouth every eight (8) hours as needed for Nausea. Quantity:  10 Tab Refills:  0 Where to Get Your Medications These medications were sent to [address] [place] [address] [address] [facility] [address] Phone:  [phone]  
  carvedilol 3.125 mg tablet Discharge Instructions Fatigue: Care Instructions Your Care Instructions Fatigue is a feeling of tiredness, exhaustion, or lack of energy. You may feel fatigue because of too much or not enough activity. It can also come from stress, lack of sleep, boredom, and poor diet. Many medical problems, such as viral infections, can cause fatigue. Emotional problems, especially depression, are often the cause of fatigue. Fatigue is most often a symptom of another problem. Treatment for fatigue depends on the cause. For example, if you have fatigue because you have a certain health problem, treating this problem also treats your fatigue. If depression or anxiety is the cause, treatment may help. Follow-up care is a key part of your treatment and safety. Be sure to make and go to all appointments, and call your doctor if you are having problems.  It's also a good idea to know your test results and keep a list of the medicines you take. How can you care for yourself at home? · Get regular exercise. But don't overdo it. Go back and forth between rest and exercise. · Get plenty of rest. 
· Eat a healthy diet. Do not skip meals, especially breakfast. 
· Reduce your use of caffeine, tobacco, and alcohol. Caffeine is most often found in coffee, tea, cola drinks, and chocolate. · Limit medicines that can cause fatigue. This includes tranquilizers and cold and allergy medicines. When should you call for help? Watch closely for changes in your health, and be sure to contact your doctor if: 
· You have new symptoms such as fever or a rash. · Your fatigue gets worse. · You have been feeling down, depressed, or hopeless. Or you may have lost interest in things that you usually enjoy. · You are not getting better as expected. Where can you learn more? Go to http://stevenObviousideaprachi.info/. Enter B547 in the search box to learn more about \"Fatigue: Care Instructions. \" Current as of: May 27, 2016 Content Version: 11.1 © 3732-0776 [employer]shot. Care instructions adapted under license by [employer] (which disclaims liability or warranty for this information). If you have questions about a medical condition or this instruction, always ask your healthcare professional. [place] [address] any warranty or liability for your use of this information. DISCHARGE SUMMARY from Nurse The following personal items are in your possession at time of discharge: 
 
Dental Appliances: Partials, Uppers, With patient Visual Aid: Glasses Home Medications: None Jewelry: None Clothing: Pants, Shirt, Undergarments, With patient Other Valuables: None Personal Items Sent to Safe: none PATIENT INSTRUCTIONS: 
 
 
F-face looks uneven A-arms unable to move or move unevenly S-speech slurred or non-existent T-time-call 911 as soon as signs and symptoms begin-DO NOT go Back to bed or wait to see if you get better-TIME IS BRAIN. Warning Signs of HEART ATTACK Call 911 if you have these symptoms: 
? Chest discomfort. Most heart attacks involve discomfort in the center of the chest that lasts more than a few minutes, or that goes away and comes back. It can feel like uncomfortable pressure, squeezing, fullness, or pain. ? Discomfort in other areas of the upper body. Symptoms can include pain or discomfort in one or both arms, the back, neck, jaw, or stomach. ? Shortness of breath with or without chest discomfort. ? Other signs may include breaking out in a cold sweat, nausea, or lightheadedness. Don't wait more than five minutes to call [address]! Fast action can save your life. Calling 911 is almost always the fastest way to get lifesaving treatment. Emergency Medical Services staff can begin treatment when they arrive  up to an hour sooner than if someone gets to the hospital by car. The discharge information has been reviewed with the patient. The patient verbalized understanding. Discharge medications reviewed with the patient and appropriate educational materials and side effects teaching were provided. Discharge Orders None [place] Announcement We are excited to announce that we are making your provider's discharge notes available to you in AeternusLED. You will see these notes when they are completed and signed by the physician that discharged you from your recent hospital stay. If you have any questions or concerns about any information you see in AeternusLED, please call the Health Information Department where you were seen or reach out to your Primary Care Provider for more information about your plan of care. Introducing [facility]! Dear Italo Ac: Thank you for requesting a [email or web address] account. Our records indicate that you already have an active [email or web address] account. You can access your account anytime at [email or web address] Did you know that you can access your hospital and ER discharge instructions at any time in [email or web address]? You can also review all of your test results from your hospital stay or ER visit. Additional Information If you have questions, please visit the Frequently Asked Questions section of the [email or web address] website at [email or web address] Remember, [email or web address] is NOT to be used for urgent needs. For medical emergencies, dial 911. Now available from your iPhone and Android! General Information Please provide this summary of care documentation to your next provider. Patient Signature:  ____________________________________________________________ Date:  ____________________________________________________________  
  
[facility] Provider Signature:  ____________________________________________________________ Date:  ____________________________________________________________

## 2017-02-27 NOTE — IP AVS SNAPSHOT
Current Discharge Medication List  
  
Take these medications at their scheduled times Dose & Instructions Dispensing Information Comments Morning Noon Evening Bedtime  
 aspirin 81 mg chewable tablet Your next dose is:  Tomorrow Dose:  81 mg Take 1 Tab by mouth daily. Resume Aspirin on March 3rd (  Will complete 7 days off   ASA ) Quantity:  30 Tab Refills:  0  
     
   
   
   
  
 carvedilol 3.125 mg tablet Commonly known as:  Skyler Branch Your next dose is: Today Dose:  3.125 mg Take 1 Tab by mouth two (2) times daily (with meals). Quantity:  60 Tab Refills:  0  
     
   
   
  
   
  
 COLACE 100 mg capsule Generic drug:  docusate sodium Your next dose is: Today Dose:  100 mg Take 100 mg by mouth three (3) times daily. Indications: Constipation Refills:  0  
     
   
   
  
   
  
 CRESTOR 5 mg tablet Generic drug:  rosuvastatin Notes to Patient:  Aziza Ledezma Dose:  5 mg Take 5 mg by mouth every Monday, Wednesday, Friday. Refills:  0  
     
   
   
   
  
 cyanocobalamin 1,000 mcg tablet Your next dose is:  Tomorrow Dose:  1000 mcg Take 1,000 mcg by mouth daily. Indications: PREVENTION OF VITAMIN B12 DEFICIENCY Refills:  0  
     
   
   
   
  
 diclofenac 1 % Gel Commonly known as:  VOLTAREN Dose:  2 g Apply 2 g to affected area four (4) times daily. Indications: OSTEOARTHRITIS Refills:  0 DULoxetine 60 mg capsule Commonly known as:  CYMBALTA Your next dose is: Today Dose:  60 mg Take 60 mg by mouth nightly. Refills:  0  
     
   
   
   
  
  
 ferrous sulfate 325 mg (65 mg iron) EC tablet Commonly known as:  IRON Your next dose is: Today Dose:  325 mg Take 1 Tab by mouth two (2) times a day. Quantity:  60 Tab Refills:  0  
     
   
   
   
  
 lisinopril 10 mg tablet Commonly known as:  Alyce Ocasio Your next dose is:  Tomorrow Dose:  10 mg Take 10 mg by mouth daily. Refills:  0 NexIUM 40 mg capsule Generic drug:  esomeprazole Your next dose is:  Tomorrow Dose:  40 mg Take 40 mg by mouth daily. Refills:  0  
     
   
   
   
  
 oxybutynin 5 mg tablet Commonly known as:  WKKXXPAI Your next dose is: Today Dose:  5 mg Take 5 mg by mouth two (2) times a day. Refills:  0 PROBIOTIC 4X 10-15 mg Tbec Generic drug:  B.infantis-B.ani-B.long-B.bifi Your next dose is:  Tomorrow Dose:  1 Tab Take 1 Tab by mouth daily. Refills:  0  
     
   
   
   
  
 rOPINIRole 5 mg tablet Commonly known as:  Mallissa Poles Your next dose is: Today Dose:  5 mg Take 5 mg by mouth nightly. Refills:  0 Take these medications as needed Dose & Instructions Dispensing Information Comments Morning Noon Evening Bedtime  
 acetaminophen 500 mg tablet Commonly known as:  TYLENOL Your next dose is: Today Dose:  500 mg Take 500 mg by mouth every six (6) hours as needed for Pain. Indications: Pain Refills:  0  
     
   
   
   
  
 nitroglycerin 0.4 mg SL tablet Commonly known as:  NITROSTAT Notes to Patient:  As needed for chest pain Dose:  0.4 mg  
1 Tab by SubLINGual route every five (5) minutes as needed for Chest Pain. Quantity:  1 Bottle Refills:  3  
     
   
   
   
  
 oxyCODONE IR 5 mg immediate release tablet Commonly known as:  Chares Ariana Your next dose is: Today Dose:  5 mg Take 1 Tab by mouth every four (4) hours as needed for Pain. Max Daily Amount: 30 mg.  
 Quantity:  90 Tab Refills:  0 ZOFRAN (AS HYDROCHLORIDE) 4 mg tablet Generic drug:  ondansetron hcl Notes to Patient:  As needed for nausea Dose:  4 mg Take 4 mg by mouth every eight (8) hours as needed for Nausea. Refills:  0 ASK your doctor about these medications Dose & Instructions Dispensing Information Comments Morning Noon Evening Bedtime  
 ondansetron 4 mg disintegrating tablet Commonly known as:  ZOFRAN ODT Dose:  4 mg Take 1 Tab by mouth every eight (8) hours as needed for Nausea. Quantity:  10 Tab Refills:  0 Where to Get Your Medications These medications were sent to [facility] Omayra Mike  [facility] [address] [facility], [place] [address] [place] [address] Phone:  [phone]  
  carvedilol 3.125 mg tablet

## 2017-02-27 NOTE — ED TRIAGE NOTES
Reports recent UTI and not feeling well. Family reports that her saturations are are dropping. States having chills and shivering today.

## 2017-02-28 PROBLEM — I50.32 CHRONIC DIASTOLIC (CONGESTIVE) HEART FAILURE (HCC): Status: ACTIVE | Noted: 2017-02-17

## 2017-02-28 PROBLEM — J96.01 ACUTE HYPOXEMIC RESPIRATORY FAILURE (HCC): Status: ACTIVE | Noted: 2017-02-28

## 2017-02-28 LAB
FLUAV AG NPH QL IA: NEGATIVE
FLUBV AG NPH QL IA: NEGATIVE

## 2017-02-28 PROCEDURE — 74011000258 HC RX REV CODE- 258: Performed by: EMERGENCY MEDICINE

## 2017-02-28 PROCEDURE — 74011250637 HC RX REV CODE- 250/637: Performed by: INTERNAL MEDICINE

## 2017-02-28 PROCEDURE — 87804 INFLUENZA ASSAY W/OPTIC: CPT | Performed by: INTERNAL MEDICINE

## 2017-02-28 PROCEDURE — 74011636320 HC RX REV CODE- 636/320: Performed by: EMERGENCY MEDICINE

## 2017-02-28 PROCEDURE — 74011250636 HC RX REV CODE- 250/636: Performed by: EMERGENCY MEDICINE

## 2017-02-28 PROCEDURE — 65270000029 HC RM PRIVATE

## 2017-02-28 RX ORDER — ONDANSETRON 4 MG/1
4 TABLET, FILM COATED ORAL
COMMUNITY

## 2017-02-28 RX ORDER — ENOXAPARIN SODIUM 100 MG/ML
40 INJECTION SUBCUTANEOUS EVERY 24 HOURS
Status: DISCONTINUED | OUTPATIENT
Start: 2017-02-28 | End: 2017-02-28

## 2017-02-28 RX ORDER — ONDANSETRON 2 MG/ML
4 INJECTION INTRAMUSCULAR; INTRAVENOUS
Status: DISCONTINUED | OUTPATIENT
Start: 2017-02-28 | End: 2017-03-01 | Stop reason: HOSPADM

## 2017-02-28 RX ORDER — CALCIUM CARBONATE 200(500)MG
400 TABLET,CHEWABLE ORAL
Status: DISCONTINUED | OUTPATIENT
Start: 2017-02-28 | End: 2017-03-01 | Stop reason: HOSPADM

## 2017-02-28 RX ORDER — ROSUVASTATIN CALCIUM 5 MG/1
5 TABLET, COATED ORAL
Status: DISCONTINUED | OUTPATIENT
Start: 2017-03-01 | End: 2017-03-01 | Stop reason: HOSPADM

## 2017-02-28 RX ORDER — SODIUM CHLORIDE 0.9 % (FLUSH) 0.9 %
5-10 SYRINGE (ML) INJECTION EVERY 8 HOURS
Status: DISCONTINUED | OUTPATIENT
Start: 2017-02-28 | End: 2017-03-01 | Stop reason: HOSPADM

## 2017-02-28 RX ORDER — LISINOPRIL 5 MG/1
10 TABLET ORAL DAILY
Status: DISCONTINUED | OUTPATIENT
Start: 2017-02-28 | End: 2017-03-01 | Stop reason: HOSPADM

## 2017-02-28 RX ORDER — DULOXETIN HYDROCHLORIDE 60 MG/1
60 CAPSULE, DELAYED RELEASE ORAL
Status: DISCONTINUED | OUTPATIENT
Start: 2017-02-28 | End: 2017-03-01 | Stop reason: HOSPADM

## 2017-02-28 RX ORDER — ROPINIROLE 1 MG/1
0.5 TABLET, FILM COATED ORAL
Status: DISCONTINUED | OUTPATIENT
Start: 2017-02-28 | End: 2017-03-01 | Stop reason: HOSPADM

## 2017-02-28 RX ORDER — CARVEDILOL 3.12 MG/1
3.12 TABLET ORAL 2 TIMES DAILY WITH MEALS
Status: DISCONTINUED | OUTPATIENT
Start: 2017-02-28 | End: 2017-03-01 | Stop reason: HOSPADM

## 2017-02-28 RX ORDER — PANTOPRAZOLE SODIUM 40 MG/1
40 TABLET, DELAYED RELEASE ORAL
Status: DISCONTINUED | OUTPATIENT
Start: 2017-02-28 | End: 2017-03-01 | Stop reason: HOSPADM

## 2017-02-28 RX ORDER — ACETAMINOPHEN 325 MG/1
650 TABLET ORAL
Status: DISCONTINUED | OUTPATIENT
Start: 2017-02-28 | End: 2017-03-01 | Stop reason: HOSPADM

## 2017-02-28 RX ORDER — ALBUTEROL SULFATE 0.83 MG/ML
2.5 SOLUTION RESPIRATORY (INHALATION)
Status: DISCONTINUED | OUTPATIENT
Start: 2017-02-28 | End: 2017-03-01 | Stop reason: HOSPADM

## 2017-02-28 RX ORDER — DOCUSATE SODIUM 100 MG/1
100 CAPSULE, LIQUID FILLED ORAL 3 TIMES DAILY
Status: DISCONTINUED | OUTPATIENT
Start: 2017-02-28 | End: 2017-03-01 | Stop reason: HOSPADM

## 2017-02-28 RX ORDER — GUAIFENESIN 100 MG/5ML
81 LIQUID (ML) ORAL DAILY
Status: DISCONTINUED | OUTPATIENT
Start: 2017-02-28 | End: 2017-03-01 | Stop reason: HOSPADM

## 2017-02-28 RX ORDER — LISINOPRIL 10 MG/1
10 TABLET ORAL DAILY
COMMUNITY

## 2017-02-28 RX ORDER — ENOXAPARIN SODIUM 100 MG/ML
40 INJECTION SUBCUTANEOUS EVERY 24 HOURS
Status: DISCONTINUED | OUTPATIENT
Start: 2017-02-28 | End: 2017-03-01 | Stop reason: HOSPADM

## 2017-02-28 RX ORDER — SODIUM CHLORIDE 0.9 % (FLUSH) 0.9 %
5-10 SYRINGE (ML) INJECTION AS NEEDED
Status: DISCONTINUED | OUTPATIENT
Start: 2017-02-28 | End: 2017-03-01 | Stop reason: HOSPADM

## 2017-02-28 RX ORDER — CARVEDILOL 6.25 MG/1
6.25 TABLET ORAL 2 TIMES DAILY WITH MEALS
COMMUNITY
End: 2017-03-01

## 2017-02-28 RX ADMIN — ACETAMINOPHEN 650 MG: 325 TABLET, FILM COATED ORAL at 21:45

## 2017-02-28 RX ADMIN — DOCUSATE SODIUM 100 MG: 100 CAPSULE, LIQUID FILLED ORAL at 08:22

## 2017-02-28 RX ADMIN — DOCUSATE SODIUM 100 MG: 100 CAPSULE, LIQUID FILLED ORAL at 16:35

## 2017-02-28 RX ADMIN — IOVERSOL 100 ML: 741 INJECTION INTRA-ARTERIAL; INTRAVENOUS at 00:03

## 2017-02-28 RX ADMIN — ENOXAPARIN SODIUM 40 MG: 40 INJECTION SUBCUTANEOUS at 08:20

## 2017-02-28 RX ADMIN — Medication 10 ML: at 21:39

## 2017-02-28 RX ADMIN — DOCUSATE SODIUM 100 MG: 100 CAPSULE, LIQUID FILLED ORAL at 21:45

## 2017-02-28 RX ADMIN — ASPIRIN 81 MG 81 MG: 81 TABLET ORAL at 08:22

## 2017-02-28 RX ADMIN — SODIUM CHLORIDE 100 ML: 900 INJECTION, SOLUTION INTRAVENOUS at 00:03

## 2017-02-28 RX ADMIN — ROPINIROLE HYDROCHLORIDE 0.5 MG: 1 TABLET, FILM COATED ORAL at 21:45

## 2017-02-28 RX ADMIN — DULOXETINE HYDROCHLORIDE 60 MG: 60 CAPSULE, DELAYED RELEASE ORAL at 21:45

## 2017-02-28 RX ADMIN — CALCIUM CARBONATE (ANTACID) CHEW TAB 500 MG 400 MG: 500 CHEW TAB at 23:08

## 2017-02-28 RX ADMIN — LISINOPRIL 10 MG: 5 TABLET ORAL at 08:21

## 2017-02-28 RX ADMIN — PANTOPRAZOLE SODIUM 40 MG: 40 TABLET, DELAYED RELEASE ORAL at 08:21

## 2017-02-28 RX ADMIN — CARVEDILOL 3.12 MG: 3.12 TABLET, FILM COATED ORAL at 09:07

## 2017-02-28 RX ADMIN — Medication 10 ML: at 00:03

## 2017-02-28 RX ADMIN — CARVEDILOL 3.12 MG: 3.12 TABLET, FILM COATED ORAL at 16:35

## 2017-02-28 NOTE — PROGRESS NOTES
Admission database completed. Patient oriented to room and call button.  New patient packet with  medication side effects fact sheet  given to patient and family and reviewed No concerns voiced at this time Primary nurse  updated

## 2017-02-28 NOTE — ED PROVIDER NOTES
HPI Comments: 51-year-old white female recently admitted to the hospital secondary to urinary tract infection with sepsis. While she was in the hospital family reports that she required oxygen. She was discharged home without supplemental oxygen. This evening family noticed the patient will be extremely cold and shivering. The report that her O2 saturation 86% at best.  She has also had generalized weakness. She did have some nausea and \"dry heaves\" just prior to coming to the emergency department. Family treated her with 2 mg of oral Zofran. Antibiotics for urinary tract infection yesterday. Patient has no specific complaints at this time. She denies chest pain and shortness of breath. Denies abdominal pain. Patient is a 80 y.o. female presenting with fatigue and chills. The history is provided by the patient. Fatigue   Associated symptoms include nausea. Pertinent negatives include no shortness of breath, no chest pain, no vomiting and no headaches. Chills    Pertinent negatives include no chest pain, no vomiting, no congestion, no headaches, no cough, no shortness of breath, no neck pain and no rash.         Past Medical History:   Diagnosis Date    Acute myocardial infarction of other anterior wall, initial episode of care [facility]) 8/30/2012    MI - Had a stent placed August 84,0348    Acute systolic congestive heart failure- in setting of anterior MI, EF 35-40% 8/61/2322    Acute systolic heart failure (HCC)     resolved at discharge, EF 35% at time of MI, 55% by echo prior to discharge    ARF (acute renal failure) ([facility] [address] 9/29/2012    Arthritis     CAD (coronary artery disease)     MI    Carotid artery stenosis without cerebral infarction     Chronic pain- chronic narcotic use for spinal stenosis 8/30/2012    Coronary atherosclerosis of native coronary artery 3/16/2013    Diarrhea 10/2/2012    Dyslipidemia 8/30/2012    Dyspnea     unspecified    Essential hypertension, benign 8/30/2012  Heart failure (HCC)     denies heart failure    Hyperlipidemia     other unsp dyslipidemia    Hypertension     controlled, benign    Ischemic colitis ([facility] [address]     Lower GI bleed 3/12/2015    Myocardial infarction, anterolateral wall, subsequent care [facility])     s/p 2.5mm Xience stent to focal lesion in mid LAD 8/2012    N&V (nausea and vomiting) 3/16/2013    Sepsis ([facility] [address] 2/12/2014    Spinal stenosis 8/30/2012    ST elevation (STEMI) myocardial infarction involving left anterior descending coronary artery (HCC)     Syncope and collapse 2/12/2014       Past Surgical History:   Procedure Laterality Date    CARDIAC SURG PROCEDURE UNLIST      stent    HX APPENDECTOMY      HX CHOLECYSTECTOMY      HX CORONARY STENT PLACEMENT      2012 for MI    HX GYN      hysterectomy    HX ORTHOPAEDIC      moreno knees, ankle and spinal cord stimulator    HX OTHER SURGICAL      cord stimulator for pain control removed         Family History:   Problem Relation Age of Onset    Hypertension Mother     Stroke Mother     Hypertension Father        Social History     Social History    Marital status:      Spouse name: N/A    Number of children: N/A    Years of education: N/A     Occupational History    Not on file. Social History Main Topics    Smoking status: Never Smoker    Smokeless tobacco: Not on file    Alcohol use No    Drug use: No    Sexual activity: Not on file     Other Topics Concern    Not on file     Social History Narrative    Lives at home independently    Has 2 children        Has living will, DNR    Eugenia Najera DO                 ALLERGIES: Morphine; Other medication; and Sulfa (sulfonamide antibiotics)    Review of Systems   Constitutional: Positive for chills and fatigue. HENT: Negative for congestion. Respiratory: Negative for cough and shortness of breath. Cardiovascular: Negative for chest pain. Gastrointestinal: Positive for nausea.  Negative for abdominal pain and vomiting. Genitourinary: Negative for dysuria. Musculoskeletal: Negative for back pain and neck pain. Skin: Negative for rash. Neurological: Negative for headaches. Vitals:    02/27/17 1858 02/27/17 1903 02/27/17 1911 02/27/17 1919   BP: 155/72   161/80   Pulse:       Resp:       Temp:       SpO2:  93% (!) 89% 92%   Weight:       Height:                Physical Exam   Constitutional: She appears well-developed and well-nourished. No distress. HENT:   Head: Normocephalic and atraumatic. Mouth/Throat: Oropharynx is clear and moist.   Eyes: Conjunctivae are normal. Pupils are equal, round, and reactive to light. Neck: Normal range of motion. Neck supple. Cardiovascular: Normal rate and regular rhythm. No murmur heard. Pulmonary/Chest: Effort normal and breath sounds normal.   Abdominal: Soft. She exhibits no distension. There is no tenderness. Musculoskeletal: Normal range of motion. Neurological: She is alert. No cranial nerve deficit. She exhibits normal muscle tone. Coordination normal.   Oriented to person and place and recognizes family members. Skin: Skin is warm and dry. Psychiatric: She has a normal mood and affect. Nursing note and vitals reviewed. MDM  Number of Diagnoses or Management Options  Diagnosis management comments: Lab work reveals a mild leukocytosis 12.5 and hypoxia on ABG, but is otherwise unremarkable. Chest x-ray shows mild pulmonary vascular congestion. Patient did require supplemental oxygen to maintain saturation in the 90s. CT was obtained and shows no evidence of PE or other pronation for her hypoxia. Discussed with Hospitalist for admission.        Amount and/or Complexity of Data Reviewed  Clinical lab tests: ordered and reviewed  Tests in the radiology section of CPT®: ordered and reviewed  Tests in the medicine section of CPT®: ordered and reviewed  Discuss the patient with other providers: yes  Independent visualization of images, tracings, or specimens: yes    Risk of Complications, Morbidity, and/or Mortality  Presenting problems: moderate  Diagnostic procedures: moderate  Management options: moderate      ED Course       Procedures

## 2017-02-28 NOTE — ROUTINE PROCESS
TRANSFER - OUT REPORT:    Verbal report given to Naina Beckett 90 on Millie Farnsworth  being transferred to 80 for routine progression of care       Report consisted of patients Situation, Background, Assessment and   Recommendations(SBAR). Information from the following report(s) SBAR was reviewed with the receiving nurse. Lines:       Opportunity for questions and clarification was provided.       Patient transported with:   [employer]

## 2017-02-28 NOTE — PROGRESS NOTES
Dual skin assessment completed with [facility] RN. Old surgical scars to mid and lower back, scattered bruising and varicose veins. No other skin impairments noted at this time.

## 2017-02-28 NOTE — PROGRESS NOTES
Pt seen and examined. Admitted after midnight    Hypoxia improved on oxygen, No SOB    Will send Flu swab    Assess for home oxygen    DNR    DC hopefully in am with or without home O2,  if flu -ve.      No bill charged for this encounter

## 2017-02-28 NOTE — H&P
HOSPITALIST H&P    NAME:  Sindy Contreras   Age:  80 y.o.  :   1932   MRN:   [id number]  PCP: Riki Mohr MD  Chief complaint: fatigue    Subjective:     Patient is a 80 y.o. female who presents with fatigue. Pt was recently discharged from the hospital 17 for acute cystitis. She had hypoxia during that admission requiring oxygen during her stay. She had been given some lasix per DC summary with improvement of oxygenation and was discharged home. Family reports patient has history of CHUCKIE but doesn't want CPAP. She has been generally weak and family reported an O2 saturation at 86% while at home. Pt denies chest pain, dyspnea, fever, chills, abdominal pain, or sore throat. She has a chronic nonproductive cough. She was evaluated by speech therapy during her last admission without any abnormal findings.     Past Medical History:   Diagnosis Date    Acute myocardial infarction of other anterior wall, initial episode of care [facility]) 2012    MI - Had a stent placed     Acute systolic congestive heart failure- in setting of anterior MI, EF 35-40%     Acute systolic heart failure (HCC)     resolved at discharge, EF 35% at time of MI, 55% by echo prior to discharge    ARF (acute renal failure) ([address] 2012    Arthritis     CAD (coronary artery disease)     MI    Carotid artery stenosis without cerebral infarction     Chronic pain- chronic narcotic use for spinal stenosis 2012    Coronary atherosclerosis of native coronary artery 3/16/2013    Diarrhea 10/2/2012    Dyslipidemia 2012    Dyspnea     unspecified    Essential hypertension, benign 2012    Heart failure ([address]     denies heart failure    Hyperlipidemia     other unsp dyslipidemia    Hypertension     controlled, benign    Ischemic colitis ([address]     Lower GI bleed 3/12/2015    Myocardial infarction, anterolateral wall, subsequent care ([address]     s/p 2.5mm Xience stent to focal lesion in mid LAD 8/2012    N&V (nausea and vomiting) 3/16/2013    Sepsis ([address] 2/12/2014    Spinal stenosis 8/30/2012    ST elevation (STEMI) myocardial infarction involving left anterior descending coronary artery (HCC)     Syncope and collapse 2/12/2014       Past Surgical History:   Procedure Laterality Date    CARDIAC SURG PROCEDURE UNLIST      stent    HX APPENDECTOMY      HX CHOLECYSTECTOMY      HX CORONARY STENT PLACEMENT      2012 for MI    HX GYN      hysterectomy    HX ORTHOPAEDIC      moreno knees, ankle and spinal cord stimulator    HX OTHER SURGICAL      cord stimulator for pain control removed       No current facility-administered medications on file prior to encounter. Current Outpatient Prescriptions on File Prior to Encounter   Medication Sig Dispense Refill    acetaminophen (TYLENOL) 500 mg tablet Take 500 mg by mouth every six (6) hours as needed for Pain. Indications: Pain      docusate sodium (COLACE) 100 mg capsule Take 100 mg by mouth three (3) times daily. Indications: Constipation      DULoxetine (CYMBALTA) 60 mg capsule Take 60 mg by mouth nightly.  esomeprazole (NEXIUM) 40 mg capsule Take  by mouth daily.  rosuvastatin (CRESTOR) 5 mg tablet Take 5 mg by mouth every Monday, Wednesday, Friday.  oxybutynin (DITROPAN) 5 mg tablet Take 5 mg by mouth two (2) times a day.  ferrous sulfate (IRON) 325 mg (65 mg iron) EC tablet Take 1 Tab by mouth two (2) times a day. 60 Tab 0    oxyCODONE IR (ROXICODONE) 5 mg immediate release tablet Take 1 Tab by mouth every four (4) hours as needed for Pain. Max Daily Amount: 30 mg. (Patient taking differently: Take 5 mg by mouth every four (4) hours as needed for Pain. Takes 10mg in am, 5mg mid day, 10mg at HS, and 5mg if needed during the night.) 90 Tab 0    aspirin 81 mg chewable tablet Take 1 Tab by mouth daily.  Resume Aspirin on March 3rd (  Will complete 7 days off   ASA ) 30 Tab 0    carvedilol (COREG) 3.125 mg tablet Take 1 Tab by mouth two (2) times daily (with meals). (Patient taking differently: Take 6.25 mg by mouth two (2) times daily (with meals). Indications: Chronic Heart Failure, hypertension) 60 Tab 0    lisinopril (PRINIVIL, ZESTRIL) 20 mg tablet Take 1.5 Tabs by mouth daily. (Patient taking differently: Take 10 mg by mouth daily. Taking 10 mg daily  Indications: HYPERTENSION) 45 Tab 0    rOPINIRole (REQUIP) 5 mg tablet Take 0.5 mg by mouth nightly.  cyanocobalamin 1,000 mcg tablet Take 1,000 mcg by mouth daily. Indications: PREVENTION OF VITAMIN B12 DEFICIENCY      diclofenac (VOLTAREN) 1 % gel Apply 2 g to affected area four (4) times daily. Indications: OSTEOARTHRITIS      B.infantis-B.ani-B.long-B.bifi (PROBIOTIC 4X) 10-15 mg TbEC Take  by mouth.  potassium chloride (K-DUR, KLOR-CON) 10 mEq tablet Take 1 Tab by mouth daily as needed. Only take this med on days when she  takes Lasix the two go together 15 Tab 5    ondansetron (ZOFRAN ODT) 4 mg disintegrating tablet Take 1 Tab by mouth every eight (8) hours as needed for Nausea. (Patient taking differently: Take 8 mg by mouth every eight (8) hours as needed for Nausea.) 10 Tab 0    nitroglycerin (NITROSTAT) 0.4 mg SL tablet 1 Tab by SubLINGual route every five (5) minutes as needed for Chest Pain. 1 Bottle 3       Allergies   Allergen Reactions    Morphine Nausea and Vomiting    Other Medication Other (comments)     \"Took steroids this week and made my face red. \"    Sulfa (Sulfonamide Antibiotics) Rash       Social History   Substance Use Topics    Smoking status: Never Smoker    Smokeless tobacco: Not on file    Alcohol use No       Family History   Problem Relation Age of Onset    Hypertension Mother     Stroke Mother     Hypertension Father        I have personally reviewed and reconciled patients history. Review of Systems  A comprehensive 12 point review of systems is negative other than what is listed above.     Objective: Patient Vitals for the past 24 hrs:   BP Temp Pulse Resp SpO2 Height Weight   02/27/17 2219 129/72 - (!) 101 - 98 % - -   02/27/17 1919 161/80 - - - 92 % - -   02/27/17 1911 - - - - (!) 89 % - -   02/27/17 1903 - - - - 93 % - -   02/27/17 1858 155/72 - - - - - -   02/27/17 1819 143/70 97.4 °F (36.3 °C) 95 20 91 % 4' 11\" (1.499 m) 62.1 kg (137 lb)       Exam:  General: awake, alert, no apparent distress  Eyes: anicteric  Neck: Supple, trachea midline  Lungs: Clear to auscultation bilaterally. Minimal end-expiratory wheeze intermittently in left lung fields. Clear otherwise. Heart: Regular rate and rhythm. No appreciable murmur. Abdomen: Soft, nontender, nondistended. Bowel sounds normal.  No rebound tenderness, guarding, or rigidity. Extremities:  No LE edema. Skin: Warm/dry. No rashes or lesions. Neurologic: CN II-XII grossly intact bilaterally. Psych: AOx3. Normal mood and affect. ECG: NSR  I have personally reviewed and interpreted ECG. Data Review (Labs):   Recent Results (from the past 24 hour(s))   CBC WITH AUTOMATED DIFF    Collection Time: 02/27/17  6:21 PM   Result Value Ref Range    WBC 12.5 (H) 4.3 - 11.1 K/uL    RBC 3.89 (L) 4.05 - 5.25 M/uL    HGB 12.0 11.7 - 15.4 g/dL    HCT 36.4 35.8 - 46.3 %    MCV 93.6 79.6 - 97.8 FL    MCH 30.8 26.1 - 32.9 PG    MCHC 33.0 31.4 - 35.0 g/dL    RDW 12.7 11.9 - 14.6 %    PLATELET 784 946 - 221 K/uL    MPV 9.6 (L) 10.8 - 14.1 FL    DF AUTOMATED      NEUTROPHILS 92 (H) 43 - 78 %    LYMPHOCYTES 5 (L) 13 - 44 %    MONOCYTES 2 (L) 4.0 - 12.0 %    EOSINOPHILS 1 0.5 - 7.8 %    BASOPHILS 0 0.0 - 2.0 %    IMMATURE GRANULOCYTES 0.2 0.0 - 5.0 %    ABS. NEUTROPHILS 11.6 (H) 1.7 - 8.2 K/UL    ABS. LYMPHOCYTES 0.6 0.5 - 4.6 K/UL    ABS. MONOCYTES 0.2 0.1 - 1.3 K/UL    ABS. EOSINOPHILS 0.1 0.0 - 0.8 K/UL    ABS. BASOPHILS 0.0 0.0 - 0.2 K/UL    ABS. IMM.  GRANS. 0.0 0.0 - 0.5 K/UL   METABOLIC PANEL, COMPREHENSIVE    Collection Time: 02/27/17  6:21 PM   Result Value Ref Range    Sodium 137 136 - 145 mmol/L    Potassium 4.4 3.5 - 5.1 mmol/L    Chloride 103 98 - 107 mmol/L    CO2 27 21 - 32 mmol/L    Anion gap 7 7 - 16 mmol/L    Glucose 82 65 - 100 mg/dL    BUN 18 8 - 23 MG/DL    Creatinine 1.03 (H) 0.6 - 1.0 MG/DL    GFR est AA >60 >60 ml/min/1.73m2    GFR est non-AA 54 (L) >60 ml/min/1.73m2    Calcium 9.7 8.3 - 10.4 MG/DL    Bilirubin, total 0.3 0.2 - 1.1 MG/DL    ALT (SGPT) 16 12 - 65 U/L    AST (SGOT) 20 15 - 37 U/L    Alk. phosphatase 97 50 - 136 U/L    Protein, total 7.7 6.3 - 8.2 g/dL    Albumin 3.6 3.2 - 4.6 g/dL    Globulin 4.1 (H) 2.3 - 3.5 g/dL    A-G Ratio 0.9 (L) 1.2 - 3.5     BNP    Collection Time: 02/27/17  6:21 PM   Result Value Ref Range    BNP 60 pg/mL   PROCALCITONIN    Collection Time: 02/27/17  6:21 PM   Result Value Ref Range    Procalcitonin 0.2 ng/mL   BLOOD GAS, ARTERIAL    Collection Time: 02/27/17  8:15 PM   Result Value Ref Range    pH 7.47 (H) 7.35 - 7.45      PCO2 32 (L) 35.0 - 45.0 mmHg    PO2 58 (L) 75.0 - 100.0 mmHg    BICARBONATE 23 22.0 - 26.0 mmol/L    BASE DEFICIT 0.4 0 - 2 mmol/L    TOTAL HEMOGLOBIN 11.8 11.7 - 15.0 GM/DL    O2 SAT 91 (L) 92.0 - 98.5 %    ARTERIAL O2 HGB 90.7 (L) 94.0 - 97.0 %    CARBOXYHEMOGLOBIN 0.3 (L) 0.5 - 1.5 %    METHEMOGLOBIN 0.3 0.0 - 1.5 %    DEOXYHEMOGLOBIN 9 (H) 0.0 - 5.0 %    SITE RR     ALLENS TEST POSITIVE      MODE RA     FIO2 21.0 %    Respiratory comment: Dr Angel Carney at 2 27 2017 8 21 50 PM. Read back.     POC LACTIC ACID    Collection Time: 02/27/17  8:33 PM   Result Value Ref Range    Lactic Acid (POC) 1.2 0.5 - 1.9 mmol/L   POC TROPONIN-I    Collection Time: 02/27/17  8:37 PM   Result Value Ref Range    Troponin-I (POC) 0.01 0.0 - 0.08 ng/ml   EKG, 12 LEAD, INITIAL    Collection Time: 02/27/17  9:00 PM   Result Value Ref Range    Systolic BP  mmHg    Diastolic BP  mmHg    Ventricular Rate 104 BPM    Atrial Rate 220 BPM    P-R Interval  ms    QRS Duration 78 ms    Q-T Interval 306 ms    QTC Calculation (Bezet) 402 ms    Calculated P Axis 74 degrees    Calculated R Axis 93 degrees    Calculated T Axis 73 degrees    Diagnosis         Normal sinus rhythm  Rightward axis  Abnormal ECG  When compared with ECG of 17-FEB-2017 16:30,    Confirmed by Karalee Runner ([id number]) on 2/27/2017 11:18:00 PM         Assessment:   Principal Problem:    Acute hypoxemic respiratory failure ([facility] (2/28/2017)    Active Problems:    Essential hypertension, benign (8/30/2012)      Lewy body dementia (2/17/2017)      Chronic diastolic (congestive) heart failure ([facility] (2/17/2017)      DNR (do not resuscitate) (2/18/2017)        Plan:     Admit to medical floor  O2  PRN albuterol  No abnormalities seen on CT  Overnight oximetry study  Will have SLP re-evaluate for s/s aspiration    DVT prophylaxis: Lovenox  Code Status: DNR    Plan of care discussed with: patient/family  Time spent on patient care: 30 minutes  Anticipated date of discharge: 3 days    Jason Lazar DO

## 2017-02-28 NOTE — PROGRESS NOTES
TRANSFER - IN REPORT:    Verbal report received from [facility] on [facility]  being received from ER for routine progression of care      Report consisted of patients Situation, Background, Assessment and   Recommendations(SBAR). Information from the following report(s) ED Summary, Procedure Summary, Intake/Output and MAR was reviewed with the receiving nurse. Opportunity for questions and clarification was provided. Assessment completed upon patients arrival to unit and care assumed.

## 2017-03-01 VITALS
WEIGHT: 143 LBS | RESPIRATION RATE: 16 BRPM | DIASTOLIC BLOOD PRESSURE: 58 MMHG | SYSTOLIC BLOOD PRESSURE: 99 MMHG | BODY MASS INDEX: 28.83 KG/M2 | HEIGHT: 59 IN | HEART RATE: 67 BPM | OXYGEN SATURATION: 99 % | TEMPERATURE: 98 F

## 2017-03-01 LAB
ANION GAP BLD CALC-SCNC: 7 MMOL/L (ref 7–16)
BASOPHILS # BLD AUTO: 0 K/UL (ref 0–0.2)
BASOPHILS # BLD: 0 % (ref 0–2)
BUN SERPL-MCNC: 33 MG/DL (ref 8–23)
CALCIUM SERPL-MCNC: 9.1 MG/DL (ref 8.3–10.4)
CHLORIDE SERPL-SCNC: 102 MMOL/L (ref 98–107)
CO2 SERPL-SCNC: 30 MMOL/L (ref 21–32)
CREAT SERPL-MCNC: 1.26 MG/DL (ref 0.6–1)
DIFFERENTIAL METHOD BLD: ABNORMAL
EOSINOPHIL # BLD: 0.4 K/UL (ref 0–0.8)
EOSINOPHIL NFR BLD: 6 % (ref 0.5–7.8)
ERYTHROCYTE [DISTWIDTH] IN BLOOD BY AUTOMATED COUNT: 13 % (ref 11.9–14.6)
GLUCOSE SERPL-MCNC: 91 MG/DL (ref 65–100)
HCT VFR BLD AUTO: 28.2 % (ref 35.8–46.3)
HGB BLD-MCNC: 9.2 G/DL (ref 11.7–15.4)
IMM GRANULOCYTES # BLD: 0 K/UL (ref 0–0.5)
IMM GRANULOCYTES NFR BLD AUTO: 0.3 % (ref 0–5)
LYMPHOCYTES # BLD AUTO: 17 % (ref 13–44)
LYMPHOCYTES # BLD: 1.2 K/UL (ref 0.5–4.6)
MCH RBC QN AUTO: 30.3 PG (ref 26.1–32.9)
MCHC RBC AUTO-ENTMCNC: 32.6 G/DL (ref 31.4–35)
MCV RBC AUTO: 92.8 FL (ref 79.6–97.8)
MONOCYTES # BLD: 0.5 K/UL (ref 0.1–1.3)
MONOCYTES NFR BLD AUTO: 7 % (ref 4–12)
NEUTS SEG # BLD: 4.9 K/UL (ref 1.7–8.2)
NEUTS SEG NFR BLD AUTO: 70 % (ref 43–78)
PLATELET # BLD AUTO: 300 K/UL (ref 150–450)
PMV BLD AUTO: 9.3 FL (ref 10.8–14.1)
POTASSIUM SERPL-SCNC: 4.4 MMOL/L (ref 3.5–5.1)
RBC # BLD AUTO: 3.04 M/UL (ref 4.05–5.25)
SODIUM SERPL-SCNC: 139 MMOL/L (ref 136–145)
WBC # BLD AUTO: 7.1 K/UL (ref 4.3–11.1)

## 2017-03-01 PROCEDURE — 77010033678 HC OXYGEN DAILY

## 2017-03-01 PROCEDURE — 85025 COMPLETE CBC W/AUTO DIFF WBC: CPT | Performed by: INTERNAL MEDICINE

## 2017-03-01 PROCEDURE — 80048 BASIC METABOLIC PNL TOTAL CA: CPT | Performed by: INTERNAL MEDICINE

## 2017-03-01 PROCEDURE — 94761 N-INVAS EAR/PLS OXIMETRY MLT: CPT

## 2017-03-01 PROCEDURE — 74011250636 HC RX REV CODE- 250/636: Performed by: EMERGENCY MEDICINE

## 2017-03-01 PROCEDURE — 36415 COLL VENOUS BLD VENIPUNCTURE: CPT | Performed by: INTERNAL MEDICINE

## 2017-03-01 PROCEDURE — 74011250637 HC RX REV CODE- 250/637: Performed by: INTERNAL MEDICINE

## 2017-03-01 RX ORDER — CARVEDILOL 3.12 MG/1
3.12 TABLET ORAL 2 TIMES DAILY WITH MEALS
Qty: 60 TAB | Refills: 0 | Status: SHIPPED | OUTPATIENT
Start: 2017-03-01

## 2017-03-01 RX ADMIN — ASPIRIN 81 MG 81 MG: 81 TABLET ORAL at 09:02

## 2017-03-01 RX ADMIN — DOCUSATE SODIUM 100 MG: 100 CAPSULE, LIQUID FILLED ORAL at 09:00

## 2017-03-01 RX ADMIN — Medication 10 ML: at 04:54

## 2017-03-01 RX ADMIN — ACETAMINOPHEN 650 MG: 325 TABLET, FILM COATED ORAL at 09:45

## 2017-03-01 RX ADMIN — PANTOPRAZOLE SODIUM 40 MG: 40 TABLET, DELAYED RELEASE ORAL at 04:54

## 2017-03-01 RX ADMIN — ENOXAPARIN SODIUM 40 MG: 40 INJECTION SUBCUTANEOUS at 09:00

## 2017-03-01 RX ADMIN — CARVEDILOL 3.12 MG: 3.12 TABLET, FILM COATED ORAL at 09:02

## 2017-03-01 RX ADMIN — LISINOPRIL 10 MG: 5 TABLET ORAL at 09:02

## 2017-03-01 NOTE — PROGRESS NOTES
Met with pt at bedside pt alert and oriented x3, states lives with daughter n law [facility] [phone], states has WC and walker in home, current with [facility] with PT/OT and aide would like to have them resumed at [place], pt states she is independent with dressing and feeding self at home, Insurance confirmed medicare and [place], PCP confirmed. Order placed to resume [facility] with [facility]    Resp Therapy requested to complete home oxygen qualifier for home O2, will await results. Pt with pending DC today home with daughter n law. Care Management Interventions  PCP Verified by CM:  Yes (Dr. Nina Or)  Mode of Transport at Discharge:  (family)  Transition of Care Consult (CM Consult): Discharge Planning, [address]: No  Reason Outside [place]: Patient already serviced by other home care/hospice agency (will need to resume [facility] at [place])  Current Support Network:  (Lives wtih daughter n law)  Confirm Follow Up Transport: Family  Plan discussed with Pt/Family/Caregiver: Yes  Freedom of Choice Offered: Yes  Discharge Location  Discharge Placement: Home with home health

## 2017-03-01 NOTE — PROGRESS NOTES
Pt will not need O2 at home, Lio Tobar notifying daughter-in-law now. Will review discharge instructions with pt and daughter-in-law.

## 2017-03-01 NOTE — DISCHARGE SUMMARY
Hospitalist Discharge Summary     Admit Date:  2017  6:52 PM   Name:  Jose Buckley   Age:  80 y.o.  :  1932   MRN:  [id number]   PCP:  Fernanda Harvey MD  Treatment Team: Attending Provider: Rc Garcia DO; Charge Nurse: Iesha Collazo RN; Utilization Review: Michael Acosta    Problem List for this Hospitalization:  Hospital Problems as of 3/1/2017  Date Reviewed: 2016          Codes Class Noted - Resolved POA    * (Principal)Acute hypoxemic respiratory failure ([facility] [address] ICD-10-CM: J96.01  ICD-9-CM: 518.81  2017 - Present Yes        DNR (do not resuscitate) (Chronic) ICD-10-CM: R99  ICD-9-CM: V49.86  2017 - Present Yes        Lewy body dementia (Chronic) ICD-10-CM: M40.16, F02.80  ICD-9-CM: 331.82  2017 - Present Yes        CKD (chronic kidney disease) stage 3, GFR 30-59 ml/min (Chronic) ICD-10-CM: N18.3  ICD-9-CM: 585.3  2017 - Present         Chronic diastolic (congestive) heart failure ([facility] [address] ICD-10-CM: I50.32  ICD-9-CM: 428.32, 428.0  2017 - Present Yes        Essential hypertension, benign (Chronic) ICD-10-CM: I10  ICD-9-CM: 401.1  2012 - Present Yes                Admission HPI from 2017:    \"  Patient is a 80 y.o. female who presents with fatigue. Pt was recently discharged from the hospital 17 for acute cystitis. She had hypoxia during that admission requiring oxygen during her stay. She had been given some lasix per DC summary with improvement of oxygenation and was discharged home. Family reports patient has history of CHUCKIE but doesn't want CPAP. She has been generally weak and family reported an O2 saturation at 86% while at home. Pt denies chest pain, dyspnea, fever, chills, abdominal pain, or sore throat. She has a chronic nonproductive cough. She was evaluated by speech therapy during her last admission without any abnormal findings. \"    Hospital Course:  CTA chest was completely negative. No PE or PNA.   No effusion, edema, or other evidence of CHF, not even cardiomegaly. Flu test was negative. Labs at baseline,   She was not desatting on room air when I saw her today. She needs outpatient management. Follow up instructions below. Plan was discussed with pt. All questions answered. Patient was stable at time of discharge and was instructed to call or return if there are any concerns or recurrence of symptoms. Diagnostic Imaging/Tests:    CT CHEST W CONT (Final result) Result time: 02/28/17 01:19:05     Final result     Impression:     IMPRESSION:    1. No pulmonary embolus. 2. No acute pulmonary disease. 3. Large hiatal hernia.         Narrative:     CT Chest with contrast    INDICATION: Hypoxia. Evaluate for PE.     COMPARISON: No prior chest CT    TECHNIQUE: Contiguous axial images were obtained from the neck base through the  upper abdomen with intravenous contrast, 100 mL Isovue 370. Radiation dose  reduction techniques were used for this study:  Our CT scanners use one or all  of the following: Automated exposure control, adjustment of the mA and/or kVp  according to patient's size, iterative reconstruction. FINDINGS:    There is no pulmonary embolus. There is no endobronchial lesion. There is no focal pulmonary consolidation,  pleural effusion or pneumothorax. No pulmonary edema. There is left basilar  atelectasis. The heart is not enlarged. There is no pericardial effusion. There are coronary  artery calcifications. The thoracic aorta is normal in course and caliber with  some atherosclerotic calcifications. A stent is present at the ostium of the  superior mesenteric artery. There is no evidence of lymphadenopathy in the  chest.    Partially visualized upper cholecystectomy. Enlargement of the common bile duct  is likely due to the history of cholecystectomy. There is large hiatal hernia. There is apex left curvature of the thoracolumbar spine.  There are degenerative  changes of the spine.              XR CHEST PA LAT (Final result) Result time: 02/27/17 21:18:22     Final result     Impression:     IMPRESSION:    Mild increased interstitial prominence, likely vascular congestion.     Narrative:     Frontal and lateral views of the chest     COMPARISON: February 18, 2017    INDICATION: Hypoxia    FINDINGS:     Lungs are underinflated. There is mild increased interstitial prominence. No  well-defined consolidation. No pleural effusion or pneumothorax. Cardiac  silhouette appears mildly prominent. Mediastinal contour is within normal  limits. There is a large hiatal hernia. There are degenerative changes of the  spine. All Micro Results     Procedure Component Value Units Date/Time    INFLUENZA A & B AG (RAPID TEST) [[id number]] Collected:  02/28/17 1908    Order Status:  Completed Specimen:  Nasopharyngeal from Nasal washing Updated:  02/28/17 2015     Influenza A Ag NEGATIVE          NEGATIVE FOR THE PRESENCE OF INFLUENZA A ANTIGEN  INFECTION DUE TO INFLUENZA A CANNOT BE RULED OUT. BECAUSE THE ANTIGEN PRESENT IN THE SAMPLE MAY BE BELOW  THE DETECTION LIMIT OF THE TEST. A NEGATIVE TEST IS PRESUMPTIVE AND IT IS RECOMMENDED THAT THESE RESULTS BE CONFIRMED BY VIRAL CULTURE OR AN FDA-CLEARED INFLUENZA A AND B MOLECULAR ASSAY. Influenza B Ag NEGATIVE          NEGATIVE FOR THE PRESENCE OF INFLUENZA B ANTIGEN  INFECTION DUE TO INFLUENZA B CANNOT BE RULED OUT. BECAUSE THE ANTIGEN PRESENT IN THE SAMPLE MAY BE BELOW  THE DETECTION LIMIT OF THE TEST. A NEGATIVE TEST IS PRESUMPTIVE AND IT IS RECOMMENDED THAT THESE RESULTS BE CONFIRMED BY VIRAL CULTURE OR AN FDA-CLEARED INFLUENZA A AND B MOLECULAR ASSAY.                Labs: Results:       BMP, Mg, Phos Recent Labs      03/01/17   0556  02/27/17   1821   NA  139  137   K  4.4  4.4   CL  102  103   CO2  30  27   AGAP  7  7   BUN  33*  18   CREA  1.26*  1.03*   CA  9.1  9.7   GLU  91  82      CBC Recent Labs      03/01/17   0556  02/27/17   1821   WBC  7.1 12.5*   RBC  3.04*  3.89*   HGB  9.2*  12.0   HCT  28.2*  36.4   PLT  300  407   GRANS  70  92*   LYMPH  17  5*   EOS  6  1   MONOS  7  2*   BASOS  0  0   IG  0.3  0.2   ANEU  4.9  11.6*   ABL  1.2  0.6   PAM  0.4  0.1   ABM  0.5  0.2   ABB  0.0  0.0   AIG  0.0  0.0      LFT Recent Labs      02/27/17   1821   SGOT  20   ALT  16   AP  97   TP  7.7   ALB  3.6   GLOB  4.1*   AGRAT  0.9*      Cardiac Testing Lab Results   Component Value Date/Time    BNP 60 02/27/2017 06:21 PM    BNP 42 01/04/2011 09:51 PM    CK 89 09/28/2012 03:00 PM    CK 54 08/30/2012 07:25 AM    CK - MB 1.2 09/28/2012 03:00 PM    CK-MB Index 1.3 09/28/2012 03:00 PM    Troponin-I <0.05 01/04/2011 09:51 PM    Troponin-I, Qt. 0.07 02/17/2017 04:30 PM    Troponin-I, Qt. <0.02 12/01/2016 07:10 PM    Troponin-I, Qt. <0.02 03/25/2016 03:40 PM      Coagulation Tests Lab Results   Component Value Date/Time    Prothrombin time 10.3 03/05/2016 03:44 AM    Prothrombin time 10.8 02/24/2016 05:26 PM    Prothrombin time 10.7 04/08/2015 07:30 AM    INR 1.0 03/05/2016 03:44 AM    INR 1.0 02/24/2016 05:26 PM    INR 1.0 04/08/2015 07:30 AM    aPTT 25.8 03/05/2016 03:44 AM    aPTT 28.2 04/08/2015 07:30 AM    aPTT 77.6 09/28/2012 03:00 PM      A1c No results found for: HBA1C, HGBE8, PIO3WSHX   Lipid Panel No results found for: CHOL, CHOLPOCT, CHOLX, CHLST, CHOLV, [id number], HDL, LDL, NLDLCT, DLDL, LDLC, DLDLP, [id number], VLDLC, VLDL, TGL, [place], [id number], [place], [id number], [facility], [facility]   Thyroid Panel Lab Results   Component Value Date/Time    TSH 1.200 03/17/2013 05:39 AM        Most Recent UA Lab Results   Component Value Date/Time    Color YELLOW 03/05/2016 06:50 AM    Appearance CLEAR 03/05/2016 06:50 AM    Specific gravity 1.006 03/13/2015 04:00 AM    pH (UA) 8.0 03/05/2016 06:50 AM    Protein TRACE 03/05/2016 06:50 AM    Glucose NEGATIVE  03/05/2016 06:50 AM    Ketone NEGATIVE  03/05/2016 06:50 AM    Bilirubin NEGATIVE  03/05/2016 06:50 AM    Blood TRACE 03/05/2016 06:50 AM    Urobilinogen 0.2 03/05/2016 06:50 AM    Nitrites NEGATIVE  03/05/2016 06:50 AM    Leukocyte Esterase NEGATIVE  03/05/2016 06:50 AM        Allergies   Allergen Reactions    Morphine Nausea and Vomiting    Other Medication Other (comments)     \"Took steroids this week and made my face red. \"    Sulfa (Sulfonamide Antibiotics) Rash     Immunization History   Administered Date(s) Administered    Influenza High Dose Vaccine PF 08/23/2012, 09/21/2013, 12/12/2014, 11/13/2015    Influenza Vaccine 10/06/2009, 10/15/2015, 10/14/2016    Pneumococcal Conjugate (PCV-13) 11/13/2015    Pneumococcal Polysaccharide (PPSV-23) 08/14/2003    Pneumococcal Vaccine (Unspecified Type) 08/31/2010    TB Skin Test (PPD) Intradermal 02/13/2014, 03/12/2015, 02/25/2016, 03/05/2016, 05/23/2016, 02/17/2017    Td, Adsorbed PF 11/04/2008       All Labs from Last 24 Hrs:  Recent Results (from the past 24 hour(s))   INFLUENZA A & B AG (RAPID TEST)    Collection Time: 02/28/17  7:08 PM   Result Value Ref Range    Influenza A Ag NEGATIVE  NEG      Influenza B Ag NEGATIVE  NEG     METABOLIC PANEL, BASIC    Collection Time: 03/01/17  5:56 AM   Result Value Ref Range    Sodium 139 136 - 145 mmol/L    Potassium 4.4 3.5 - 5.1 mmol/L    Chloride 102 98 - 107 mmol/L    CO2 30 21 - 32 mmol/L    Anion gap 7 7 - 16 mmol/L    Glucose 91 65 - 100 mg/dL    BUN 33 (H) 8 - 23 MG/DL    Creatinine 1.26 (H) 0.6 - 1.0 MG/DL    GFR est AA 52 (L) >60 ml/min/1.73m2    GFR est non-AA 43 (L) >60 ml/min/1.73m2    Calcium 9.1 8.3 - 10.4 MG/DL   CBC WITH AUTOMATED DIFF    Collection Time: 03/01/17  5:56 AM   Result Value Ref Range    WBC 7.1 4.3 - 11.1 K/uL    RBC 3.04 (L) 4.05 - 5.25 M/uL    HGB 9.2 (L) 11.7 - 15.4 g/dL    HCT 28.2 (L) 35.8 - 46.3 %    MCV 92.8 79.6 - 97.8 FL    MCH 30.3 26.1 - 32.9 PG    MCHC 32.6 31.4 - 35.0 g/dL    RDW 13.0 11.9 - 14.6 %    PLATELET 001 825 - 408 K/uL    MPV 9.3 (L) 10.8 - 14.1 FL    DF AUTOMATED NEUTROPHILS 70 43 - 78 %    LYMPHOCYTES 17 13 - 44 %    MONOCYTES 7 4.0 - 12.0 %    EOSINOPHILS 6 0.5 - 7.8 %    BASOPHILS 0 0.0 - 2.0 %    IMMATURE GRANULOCYTES 0.3 0.0 - 5.0 %    ABS. NEUTROPHILS 4.9 1.7 - 8.2 K/UL    ABS. LYMPHOCYTES 1.2 0.5 - 4.6 K/UL    ABS. MONOCYTES 0.5 0.1 - 1.3 K/UL    ABS. EOSINOPHILS 0.4 0.0 - 0.8 K/UL    ABS. BASOPHILS 0.0 0.0 - 0.2 K/UL    ABS. IMM. GRANS. 0.0 0.0 - 0.5 K/UL       Discharge Exam:  Patient Vitals for the past 24 hrs:   Temp Pulse Resp BP SpO2   03/01/17 1106 98.4 °F (36.9 °C) 75 16 126/66 100 %   03/01/17 0719 97.5 °F (36.4 °C) 64 16 145/71 99 %   03/01/17 0429 98.3 °F (36.8 °C) 61 16 133/62 100 %   02/28/17 2320 98.3 °F (36.8 °C) 68 16 117/57 99 %   02/28/17 1946 98.3 °F (36.8 °C) 69 16 109/50 99 %   02/28/17 1457 98.3 °F (36.8 °C) 64 - 119/65 98 %   02/28/17 1400 - 60 (!) 43 103/52 100 %   02/28/17 1300 - 65 22 126/65 92 %     Oxygen Therapy  O2 Sat (%): 100 % (03/01/17 1106)  Pulse via Oximetry: 60 beats per minute (02/28/17 1400)  O2 Device: Room air (02/27/17 1819)  No intake or output data in the 24 hours ending 03/01/17 1259    General:    Well nourished. Alert. No distress. Eyes:   Normal sclera. Extraocular movements intact. ENT:  Normocephalic, atraumatic. Moist mucous membranes  CV:   Regular rate and rhythm. No murmur, rub, or gallop. Lungs:  Clear to auscultation bilaterally. No wheezing, rhonchi, or rales. Abdomen: Soft, nontender, nondistended. Bowel sounds normal.   Extremities: Warm and dry. No cyanosis or edema. Neurologic: CN II-XII grossly intact. Sensation intact. Skin:     No rashes or jaundice. No wounds. Psych:  Normal mood and affect. Discharge Info:   Current Discharge Medication List      CONTINUE these medications which have CHANGED    Details   carvedilol (COREG) 3.125 mg tablet Take 1 Tab by mouth two (2) times daily (with meals).   Qty: 60 Tab, Refills: 0         CONTINUE these medications which have NOT CHANGED Details   lisinopril (PRINIVIL, ZESTRIL) 10 mg tablet Take 10 mg by mouth daily. ondansetron hcl (ZOFRAN, AS HYDROCHLORIDE,) 4 mg tablet Take 4 mg by mouth every eight (8) hours as needed for Nausea. acetaminophen (TYLENOL) 500 mg tablet Take 500 mg by mouth every six (6) hours as needed for Pain. Indications: Pain      docusate sodium (COLACE) 100 mg capsule Take 100 mg by mouth three (3) times daily. Indications: Constipation      DULoxetine (CYMBALTA) 60 mg capsule Take 60 mg by mouth nightly. esomeprazole (NEXIUM) 40 mg capsule Take 40 mg by mouth daily. rosuvastatin (CRESTOR) 5 mg tablet Take 5 mg by mouth every Monday, Wednesday, Friday. oxybutynin (DITROPAN) 5 mg tablet Take 5 mg by mouth two (2) times a day. ferrous sulfate (IRON) 325 mg (65 mg iron) EC tablet Take 1 Tab by mouth two (2) times a day. Qty: 60 Tab, Refills: 0      oxyCODONE IR (ROXICODONE) 5 mg immediate release tablet Take 1 Tab by mouth every four (4) hours as needed for Pain. Max Daily Amount: 30 mg.  Qty: 90 Tab, Refills: 0      rOPINIRole (REQUIP) 5 mg tablet Take 5 mg by mouth nightly. cyanocobalamin 1,000 mcg tablet Take 1,000 mcg by mouth daily. Indications: PREVENTION OF VITAMIN B12 DEFICIENCY      diclofenac (VOLTAREN) 1 % gel Apply 2 g to affected area four (4) times daily. Indications: OSTEOARTHRITIS      B.infantis-B.ani-B.long-B.bifi (PROBIOTIC 4X) 10-15 mg TbEC Take 1 Tab by mouth daily. aspirin 81 mg chewable tablet Take 1 Tab by mouth daily. Resume Aspirin on March 3rd (  Will complete 7 days off   ASA )  Qty: 30 Tab, Refills: 0      nitroglycerin (NITROSTAT) 0.4 mg SL tablet 1 Tab by SubLINGual route every five (5) minutes as needed for Chest Pain.   Qty: 1 Bottle, Refills: 3         STOP taking these medications       ondansetron (ZOFRAN ODT) 4 mg disintegrating tablet Comments:   Reason for Stopping:                 Disposition: home  Activity: Activity as tolerated  Diet: Resume previous diet    Follow-up Information     Follow up With Details Comments 1615 Chris Menjivar MD In 3 days follow up [address] [facility],[address]  [phone]              Time spent in patient discharge planning and coordination 35 minutes.     Signed:  Emma Patel MD

## 2017-03-01 NOTE — PROGRESS NOTES
Continue to wait on respiratory therapist to complete home O2 qaulifier for home. 1651 home oxygen qualifier complete, printed and will fax with resume [employer] orders and documentation for [facility]. Called and left message for SHAKIRA Nelson Over[employer] that pt will not need home O2 and is ready for DC. Number left to call unit with questions. DC RN made aware as well.

## 2017-03-01 NOTE — PROGRESS NOTES
This RN was waiting on daughter-in-law to arrive to review discharge instructions. This RN was not informed that daughter-in-law had arrived to hospital. Pt was taken out by unit staff. Will call and review instructions over the phone.

## 2017-03-01 NOTE — DISCHARGE INSTRUCTIONS
Fatigue: Care Instructions  Your Care Instructions  Fatigue is a feeling of tiredness, exhaustion, or lack of energy. You may feel fatigue because of too much or not enough activity. It can also come from stress, lack of sleep, boredom, and poor diet. Many medical problems, such as viral infections, can cause fatigue. Emotional problems, especially depression, are often the cause of fatigue. Fatigue is most often a symptom of another problem. Treatment for fatigue depends on the cause. For example, if you have fatigue because you have a certain health problem, treating this problem also treats your fatigue. If depression or anxiety is the cause, treatment may help. Follow-up care is a key part of your treatment and safety. Be sure to make and go to all appointments, and call your doctor if you are having problems. It's also a good idea to know your test results and keep a list of the medicines you take. How can you care for yourself at home? · Get regular exercise. But don't overdo it. Go back and forth between rest and exercise. · Get plenty of rest.  · Eat a healthy diet. Do not skip meals, especially breakfast.  · Reduce your use of caffeine, tobacco, and alcohol. Caffeine is most often found in coffee, tea, cola drinks, and chocolate. · Limit medicines that can cause fatigue. This includes tranquilizers and cold and allergy medicines. When should you call for help? Watch closely for changes in your health, and be sure to contact your doctor if:  · You have new symptoms such as fever or a rash. · Your fatigue gets worse. · You have been feeling down, depressed, or hopeless. Or you may have lost interest in things that you usually enjoy. · You are not getting better as expected. Where can you learn more? Go to http://steven-prachi.info/. Enter Z220 in the search box to learn more about \"Fatigue: Care Instructions. \"  Current as of: May 27, 2016  Content Version: 11.1  © 2224-0852 [employer]wise, [employer]. Care instructions adapted under license by [employer] (which disclaims liability or warranty for this information). If you have questions about a medical condition or this instruction, always ask your healthcare professional. [address] any warranty or liability for your use of this information. DISCHARGE SUMMARY from Nurse    The following personal items are in your possession at time of discharge:    Dental Appliances: Partials, Uppers, With patient  Visual Aid: Glasses     Home Medications: None  Jewelry: None  Clothing: Pants, Shirt, Undergarments, With patient  Other Valuables: None  Personal Items Sent to Safe: none          PATIENT INSTRUCTIONS:    After general anesthesia or intravenous sedation, for 24 hours or while taking prescription Narcotics:  · Limit your activities  · Do not drive and operate hazardous machinery  · Do not make important personal or business decisions  · Do  not drink alcoholic beverages  · If you have not urinated within 8 hours after discharge, please contact your surgeon on call. Report the following to your surgeon:  · Excessive pain, swelling, redness or odor of or around the surgical area  · Temperature over 100.5  · Nausea and vomiting lasting longer than 4 hours or if unable to take medications  · Any signs of decreased circulation or nerve impairment to extremity: change in color, persistent  numbness, tingling, coldness or increase pain  · Any questions        What to do at Home:  Recommended activity: Activity as tolerated, diet as tolerated. *  Please give a list of your current medications to your Primary Care Provider. *  Please update this list whenever your medications are discontinued, doses are      changed, or new medications (including over-the-counter products) are added. *  Please carry medication information at all times in case of emergency situations.           These are general instructions for a healthy lifestyle:    No smoking/ No tobacco products/ Avoid exposure to second hand smoke    Surgeon General's Warning:  Quitting smoking now greatly reduces serious risk to your health. Obesity, smoking, and sedentary lifestyle greatly increases your risk for illness    A healthy diet, regular physical exercise & weight monitoring are important for maintaining a healthy lifestyle    You may be retaining fluid if you have a history of heart failure or if you experience any of the following symptoms:  Weight gain of 3 pounds or more overnight or 5 pounds in a week, increased swelling in our hands or feet or shortness of breath while lying flat in bed. Please call your doctor as soon as you notice any of these symptoms; do not wait until your next office visit. Recognize signs and symptoms of STROKE:    F-face looks uneven    A-arms unable to move or move unevenly    S-speech slurred or non-existent    T-time-call 911 as soon as signs and symptoms begin-DO NOT go       Back to bed or wait to see if you get better-TIME IS BRAIN. Warning Signs of HEART ATTACK     Call 911 if you have these symptoms:   Chest discomfort. Most heart attacks involve discomfort in the center of the chest that lasts more than a few minutes, or that goes away and comes back. It can feel like uncomfortable pressure, squeezing, fullness, or pain.  Discomfort in other areas of the upper body. Symptoms can include pain or discomfort in one or both arms, the back, neck, jaw, or stomach.  Shortness of breath with or without chest discomfort.  Other signs may include breaking out in a cold sweat, nausea, or lightheadedness. Don't wait more than five minutes to call 911 - MINUTES MATTER! Fast action can save your life. Calling 911 is almost always the fastest way to get lifesaving treatment.  Emergency Medical Services staff can begin treatment when they arrive -- up to an hour sooner than if someone gets to the hospital by car. The discharge information has been reviewed with the patient. The patient verbalized understanding. Discharge medications reviewed with the patient and appropriate educational materials and side effects teaching were provided.

## 2017-03-01 NOTE — PROGRESS NOTES
Pt needs an evaluation for home O2, will follow along and review discharge instructions once this evaluation is done.

## 2018-01-25 ENCOUNTER — APPOINTMENT (OUTPATIENT)
Dept: GENERAL RADIOLOGY | Age: 83
DRG: 481 | End: 2018-01-25
Attending: EMERGENCY MEDICINE
Payer: COMMERCIAL

## 2018-01-25 ENCOUNTER — APPOINTMENT (OUTPATIENT)
Dept: CT IMAGING | Age: 83
DRG: 481 | End: 2018-01-25
Attending: EMERGENCY MEDICINE
Payer: COMMERCIAL

## 2018-01-25 ENCOUNTER — HOSPITAL ENCOUNTER (INPATIENT)
Age: 83
LOS: 6 days | Discharge: NURSING, REHAB OR LONG-TERM CARE | DRG: 481 | End: 2018-01-31
Attending: EMERGENCY MEDICINE | Admitting: FAMILY MEDICINE
Payer: COMMERCIAL

## 2018-01-25 DIAGNOSIS — G89.29 OTHER CHRONIC PAIN: Chronic | ICD-10-CM

## 2018-01-25 DIAGNOSIS — S72.001A CLOSED FRACTURE OF RIGHT HIP, INITIAL ENCOUNTER (HCC): Primary | ICD-10-CM

## 2018-01-25 PROBLEM — J96.01 ACUTE HYPOXEMIC RESPIRATORY FAILURE (HCC): Status: RESOLVED | Noted: 2017-02-28 | Resolved: 2018-01-25

## 2018-01-25 PROBLEM — I50.32 CHRONIC DIASTOLIC CONGESTIVE HEART FAILURE (HCC): Chronic | Status: ACTIVE | Noted: 2017-02-17

## 2018-01-25 PROBLEM — N30.00 ACUTE CYSTITIS WITHOUT HEMATURIA: Status: RESOLVED | Noted: 2017-02-18 | Resolved: 2018-01-25

## 2018-01-25 LAB
ALBUMIN SERPL-MCNC: 3.3 G/DL (ref 3.2–4.6)
ALBUMIN/GLOB SERPL: 0.8 {RATIO} (ref 1.2–3.5)
ALP SERPL-CCNC: 105 U/L (ref 50–136)
ALT SERPL-CCNC: 21 U/L (ref 12–65)
ANION GAP SERPL CALC-SCNC: 13 MMOL/L (ref 7–16)
APPEARANCE UR: CLEAR
AST SERPL-CCNC: 22 U/L (ref 15–37)
BACTERIA URNS QL MICRO: 0 /HPF
BASOPHILS # BLD: 0 K/UL (ref 0–0.2)
BASOPHILS NFR BLD: 0 % (ref 0–2)
BILIRUB SERPL-MCNC: 0.4 MG/DL (ref 0.2–1.1)
BILIRUB UR QL: NEGATIVE
BUN SERPL-MCNC: 30 MG/DL (ref 8–23)
CALCIUM SERPL-MCNC: 9.8 MG/DL (ref 8.3–10.4)
CHLORIDE SERPL-SCNC: 101 MMOL/L (ref 98–107)
CO2 SERPL-SCNC: 23 MMOL/L (ref 21–32)
COLOR UR: YELLOW
CREAT SERPL-MCNC: 1.02 MG/DL (ref 0.6–1)
DIFFERENTIAL METHOD BLD: ABNORMAL
EOSINOPHIL # BLD: 0.1 K/UL (ref 0–0.8)
EOSINOPHIL NFR BLD: 1 % (ref 0.5–7.8)
ERYTHROCYTE [DISTWIDTH] IN BLOOD BY AUTOMATED COUNT: 13 % (ref 11.9–14.6)
GLOBULIN SER CALC-MCNC: 4.1 G/DL (ref 2.3–3.5)
GLUCOSE SERPL-MCNC: 121 MG/DL (ref 65–100)
GLUCOSE UR STRIP.AUTO-MCNC: NEGATIVE MG/DL
HCT VFR BLD AUTO: 36.1 % (ref 35.8–46.3)
HGB BLD-MCNC: 12.1 G/DL (ref 11.7–15.4)
HGB UR QL STRIP: NEGATIVE
IMM GRANULOCYTES # BLD: 0.1 K/UL (ref 0–0.5)
IMM GRANULOCYTES NFR BLD AUTO: 0 % (ref 0–5)
INR PPP: 1
KETONES UR QL STRIP.AUTO: ABNORMAL MG/DL
LEUKOCYTE ESTERASE UR QL STRIP.AUTO: NEGATIVE
LYMPHOCYTES # BLD: 1.2 K/UL (ref 0.5–4.6)
LYMPHOCYTES NFR BLD: 8 % (ref 13–44)
MCH RBC QN AUTO: 30.9 PG (ref 26.1–32.9)
MCHC RBC AUTO-ENTMCNC: 33.5 G/DL (ref 31.4–35)
MCV RBC AUTO: 92.3 FL (ref 79.6–97.8)
MONOCYTES # BLD: 0.9 K/UL (ref 0.1–1.3)
MONOCYTES NFR BLD: 6 % (ref 4–12)
NEUTS SEG # BLD: 11.5 K/UL (ref 1.7–8.2)
NEUTS SEG NFR BLD: 85 % (ref 43–78)
NITRITE UR QL STRIP.AUTO: NEGATIVE
PH UR STRIP: 8 [PH] (ref 5–9)
PLATELET # BLD AUTO: 353 K/UL (ref 150–450)
PMV BLD AUTO: 9.9 FL (ref 10.8–14.1)
POTASSIUM SERPL-SCNC: 4 MMOL/L (ref 3.5–5.1)
PROT SERPL-MCNC: 7.4 G/DL (ref 6.3–8.2)
PROT UR STRIP-MCNC: 30 MG/DL
PROTHROMBIN TIME: 12.6 SEC (ref 11.5–14.5)
RBC # BLD AUTO: 3.91 M/UL (ref 4.05–5.25)
SODIUM SERPL-SCNC: 137 MMOL/L (ref 136–145)
SP GR UR REFRACTOMETRY: 1.01 (ref 1–1.02)
UROBILINOGEN UR QL STRIP.AUTO: 0.2 EU/DL (ref 0.2–1)
WBC # BLD AUTO: 13.7 K/UL (ref 4.3–11.1)

## 2018-01-25 PROCEDURE — 85025 COMPLETE CBC W/AUTO DIFF WBC: CPT | Performed by: EMERGENCY MEDICINE

## 2018-01-25 PROCEDURE — 81003 URINALYSIS AUTO W/O SCOPE: CPT | Performed by: EMERGENCY MEDICINE

## 2018-01-25 PROCEDURE — 70450 CT HEAD/BRAIN W/O DYE: CPT

## 2018-01-25 PROCEDURE — 86900 BLOOD TYPING SEROLOGIC ABO: CPT | Performed by: EMERGENCY MEDICINE

## 2018-01-25 PROCEDURE — 72125 CT NECK SPINE W/O DYE: CPT

## 2018-01-25 PROCEDURE — 80053 COMPREHEN METABOLIC PANEL: CPT | Performed by: EMERGENCY MEDICINE

## 2018-01-25 PROCEDURE — 71045 X-RAY EXAM CHEST 1 VIEW: CPT

## 2018-01-25 PROCEDURE — 86923 COMPATIBILITY TEST ELECTRIC: CPT | Performed by: EMERGENCY MEDICINE

## 2018-01-25 PROCEDURE — 65270000029 HC RM PRIVATE

## 2018-01-25 PROCEDURE — 85610 PROTHROMBIN TIME: CPT | Performed by: EMERGENCY MEDICINE

## 2018-01-25 PROCEDURE — 93005 ELECTROCARDIOGRAM TRACING: CPT | Performed by: EMERGENCY MEDICINE

## 2018-01-25 PROCEDURE — 77030005518 HC CATH URETH FOL 2W BARD -B

## 2018-01-25 PROCEDURE — 99285 EMERGENCY DEPT VISIT HI MDM: CPT | Performed by: EMERGENCY MEDICINE

## 2018-01-25 PROCEDURE — 73502 X-RAY EXAM HIP UNI 2-3 VIEWS: CPT

## 2018-01-25 PROCEDURE — 96374 THER/PROPH/DIAG INJ IV PUSH: CPT | Performed by: EMERGENCY MEDICINE

## 2018-01-25 PROCEDURE — 96375 TX/PRO/DX INJ NEW DRUG ADDON: CPT | Performed by: EMERGENCY MEDICINE

## 2018-01-25 PROCEDURE — 73552 X-RAY EXAM OF FEMUR 2/>: CPT

## 2018-01-25 PROCEDURE — 74011250636 HC RX REV CODE- 250/636: Performed by: EMERGENCY MEDICINE

## 2018-01-25 RX ORDER — OXYBUTYNIN CHLORIDE 5 MG/1
5 TABLET ORAL 2 TIMES DAILY
Status: DISCONTINUED | OUTPATIENT
Start: 2018-01-26 | End: 2018-01-31 | Stop reason: HOSPADM

## 2018-01-25 RX ORDER — ONDANSETRON 2 MG/ML
4 INJECTION INTRAMUSCULAR; INTRAVENOUS
Status: DISCONTINUED | OUTPATIENT
Start: 2018-01-25 | End: 2018-01-26 | Stop reason: HOSPADM

## 2018-01-25 RX ORDER — SODIUM CHLORIDE 9 MG/ML
100 INJECTION, SOLUTION INTRAVENOUS CONTINUOUS
Status: DISCONTINUED | OUTPATIENT
Start: 2018-01-26 | End: 2018-01-26 | Stop reason: HOSPADM

## 2018-01-25 RX ORDER — CEFAZOLIN SODIUM/WATER 2 G/20 ML
2 SYRINGE (ML) INTRAVENOUS
Status: COMPLETED | OUTPATIENT
Start: 2018-01-26 | End: 2018-01-26

## 2018-01-25 RX ORDER — SODIUM CHLORIDE 0.9 % (FLUSH) 0.9 %
5-10 SYRINGE (ML) INJECTION EVERY 8 HOURS
Status: DISCONTINUED | OUTPATIENT
Start: 2018-01-26 | End: 2018-01-26 | Stop reason: HOSPADM

## 2018-01-25 RX ORDER — DOCUSATE SODIUM 100 MG/1
100 CAPSULE, LIQUID FILLED ORAL 3 TIMES DAILY
Status: DISCONTINUED | OUTPATIENT
Start: 2018-01-26 | End: 2018-01-26

## 2018-01-25 RX ORDER — HYDRALAZINE HYDROCHLORIDE 20 MG/ML
10 INJECTION INTRAMUSCULAR; INTRAVENOUS
Status: DISCONTINUED | OUTPATIENT
Start: 2018-01-25 | End: 2018-01-31 | Stop reason: HOSPADM

## 2018-01-25 RX ORDER — ROSUVASTATIN CALCIUM 5 MG/1
5 TABLET, COATED ORAL
Status: DISCONTINUED | OUTPATIENT
Start: 2018-01-26 | End: 2018-01-31 | Stop reason: HOSPADM

## 2018-01-25 RX ORDER — OXYCODONE HYDROCHLORIDE 5 MG/1
5 TABLET ORAL
Status: DISCONTINUED | OUTPATIENT
Start: 2018-01-25 | End: 2018-01-26

## 2018-01-25 RX ORDER — SODIUM CHLORIDE 9 MG/ML
2000 INJECTION, SOLUTION INTRAVENOUS CONTINUOUS
Status: DISCONTINUED | OUTPATIENT
Start: 2018-01-25 | End: 2018-01-25

## 2018-01-25 RX ORDER — HYDROMORPHONE HYDROCHLORIDE 2 MG/ML
0.75 INJECTION, SOLUTION INTRAMUSCULAR; INTRAVENOUS; SUBCUTANEOUS
Status: DISCONTINUED | OUTPATIENT
Start: 2018-01-25 | End: 2018-01-26

## 2018-01-25 RX ORDER — ACETAMINOPHEN 325 MG/1
650 TABLET ORAL EVERY 8 HOURS
Status: DISCONTINUED | OUTPATIENT
Start: 2018-01-26 | End: 2018-01-25 | Stop reason: SDUPTHER

## 2018-01-25 RX ORDER — CARVEDILOL 3.12 MG/1
3.12 TABLET ORAL 2 TIMES DAILY WITH MEALS
Status: DISCONTINUED | OUTPATIENT
Start: 2018-01-26 | End: 2018-01-31 | Stop reason: HOSPADM

## 2018-01-25 RX ORDER — LISINOPRIL 5 MG/1
10 TABLET ORAL DAILY
Status: DISCONTINUED | OUTPATIENT
Start: 2018-01-26 | End: 2018-01-31 | Stop reason: HOSPADM

## 2018-01-25 RX ORDER — HYDROMORPHONE HYDROCHLORIDE 2 MG/ML
0.5 INJECTION, SOLUTION INTRAMUSCULAR; INTRAVENOUS; SUBCUTANEOUS
Status: DISCONTINUED | OUTPATIENT
Start: 2018-01-25 | End: 2018-01-25 | Stop reason: ALTCHOICE

## 2018-01-25 RX ORDER — PANTOPRAZOLE SODIUM 40 MG/1
40 TABLET, DELAYED RELEASE ORAL
Status: DISCONTINUED | OUTPATIENT
Start: 2018-01-26 | End: 2018-01-31 | Stop reason: HOSPADM

## 2018-01-25 RX ORDER — ACETAMINOPHEN 325 MG/1
650 TABLET ORAL EVERY 8 HOURS
Status: DISCONTINUED | OUTPATIENT
Start: 2018-01-25 | End: 2018-01-26

## 2018-01-25 RX ORDER — ONDANSETRON 2 MG/ML
4 INJECTION INTRAMUSCULAR; INTRAVENOUS
Status: COMPLETED | OUTPATIENT
Start: 2018-01-25 | End: 2018-01-25

## 2018-01-25 RX ORDER — OXYCODONE HYDROCHLORIDE 5 MG/1
5 TABLET ORAL
Status: DISCONTINUED | OUTPATIENT
Start: 2018-01-25 | End: 2018-01-25 | Stop reason: SDUPTHER

## 2018-01-25 RX ORDER — SODIUM CHLORIDE, SODIUM LACTATE, POTASSIUM CHLORIDE, CALCIUM CHLORIDE 600; 310; 30; 20 MG/100ML; MG/100ML; MG/100ML; MG/100ML
75 INJECTION, SOLUTION INTRAVENOUS
Status: COMPLETED | OUTPATIENT
Start: 2018-01-25 | End: 2018-01-26

## 2018-01-25 RX ORDER — HYDROMORPHONE HYDROCHLORIDE 2 MG/ML
0.75 INJECTION, SOLUTION INTRAMUSCULAR; INTRAVENOUS; SUBCUTANEOUS
Status: COMPLETED | OUTPATIENT
Start: 2018-01-25 | End: 2018-01-25

## 2018-01-25 RX ORDER — TRAMADOL HYDROCHLORIDE 50 MG/1
50 TABLET ORAL
Status: DISCONTINUED | OUTPATIENT
Start: 2018-01-25 | End: 2018-01-25

## 2018-01-25 RX ORDER — DULOXETIN HYDROCHLORIDE 60 MG/1
60 CAPSULE, DELAYED RELEASE ORAL
Status: DISCONTINUED | OUTPATIENT
Start: 2018-01-26 | End: 2018-01-31 | Stop reason: HOSPADM

## 2018-01-25 RX ORDER — HYDROMORPHONE HYDROCHLORIDE 2 MG/ML
0.5 INJECTION, SOLUTION INTRAMUSCULAR; INTRAVENOUS; SUBCUTANEOUS ONCE
Status: COMPLETED | OUTPATIENT
Start: 2018-01-25 | End: 2018-01-25

## 2018-01-25 RX ORDER — SODIUM CHLORIDE 0.9 % (FLUSH) 0.9 %
5-10 SYRINGE (ML) INJECTION AS NEEDED
Status: DISCONTINUED | OUTPATIENT
Start: 2018-01-25 | End: 2018-01-26 | Stop reason: HOSPADM

## 2018-01-25 RX ORDER — CEFAZOLIN SODIUM/WATER 2 G/20 ML
2 SYRINGE (ML) INTRAVENOUS
Status: DISCONTINUED | OUTPATIENT
Start: 2018-01-26 | End: 2018-01-25 | Stop reason: SDUPTHER

## 2018-01-25 RX ADMIN — HYDROMORPHONE HYDROCHLORIDE 0.76 MG: 2 INJECTION, SOLUTION INTRAMUSCULAR; INTRAVENOUS; SUBCUTANEOUS at 21:46

## 2018-01-25 RX ADMIN — HYDROMORPHONE HYDROCHLORIDE 0.5 MG: 2 INJECTION, SOLUTION INTRAMUSCULAR; INTRAVENOUS; SUBCUTANEOUS at 19:21

## 2018-01-25 RX ADMIN — ONDANSETRON 4 MG: 2 INJECTION INTRAMUSCULAR; INTRAVENOUS at 19:21

## 2018-01-25 NOTE — Clinical Note
Status[de-identified] Inpatient [101] Type of Bed: Medical [8] Inpatient Hospitalization Certified Necessary for the Following Reasons: 3. Patient receiving treatment that can only be provided in an inpatient setting (further clarification in H&P documentation) Admitting Diagnosis: Closed right hip fracture ([facility] [address] [[id number]] Admitting Physician: Liang Schwartz [[id number]] Attending Physician: Liang Schwartz [[id number]] Estimated Length of Stay: 3-4 Midnights Discharge Plan[de-identified] Home with Office Follow-up

## 2018-01-25 NOTE — ED TRIAGE NOTES
Pt arrives via EMS from [facility] for an unwitness fall and was found next to her bed. Pt complaining of right hip pain. Pt has obvious shortening of right leg. EMS placed 20G in L Wrist and was told it will flush, but won't draw. EMS did not give Morphine since patient is allergic to Morphine.

## 2018-01-26 ENCOUNTER — ANESTHESIA (OUTPATIENT)
Dept: SURGERY | Age: 83
DRG: 481 | End: 2018-01-26
Payer: COMMERCIAL

## 2018-01-26 ENCOUNTER — ANESTHESIA EVENT (OUTPATIENT)
Dept: SURGERY | Age: 83
DRG: 481 | End: 2018-01-26
Payer: COMMERCIAL

## 2018-01-26 ENCOUNTER — APPOINTMENT (OUTPATIENT)
Dept: GENERAL RADIOLOGY | Age: 83
DRG: 481 | End: 2018-01-26
Attending: ORTHOPAEDIC SURGERY
Payer: COMMERCIAL

## 2018-01-26 LAB
ANION GAP SERPL CALC-SCNC: 9 MMOL/L (ref 7–16)
BACTERIA SPEC CULT: NORMAL
BASOPHILS # BLD: 0 K/UL (ref 0–0.2)
BASOPHILS NFR BLD: 0 % (ref 0–2)
BUN SERPL-MCNC: 33 MG/DL (ref 8–23)
CALCIUM SERPL-MCNC: 9.2 MG/DL (ref 8.3–10.4)
CALCIUM SERPL-MCNC: 9.2 MG/DL (ref 8.3–10.4)
CALCULATED P AXIS, ECG09: 97 DEGREES
CALCULATED R AXIS, ECG10: 79 DEGREES
CALCULATED T AXIS, ECG11: 70 DEGREES
CHLORIDE SERPL-SCNC: 102 MMOL/L (ref 98–107)
CO2 SERPL-SCNC: 27 MMOL/L (ref 21–32)
CREAT SERPL-MCNC: 1.29 MG/DL (ref 0.6–1)
DIAGNOSIS, 93000: NORMAL
DIFFERENTIAL METHOD BLD: ABNORMAL
EOSINOPHIL # BLD: 0 K/UL (ref 0–0.8)
EOSINOPHIL NFR BLD: 0 % (ref 0.5–7.8)
ERYTHROCYTE [DISTWIDTH] IN BLOOD BY AUTOMATED COUNT: 13.3 % (ref 11.9–14.6)
GLUCOSE SERPL-MCNC: 159 MG/DL (ref 65–100)
HCT VFR BLD AUTO: 31.8 % (ref 35.8–46.3)
HGB BLD-MCNC: 10.4 G/DL (ref 11.7–15.4)
IMM GRANULOCYTES # BLD: 0 K/UL (ref 0–0.5)
IMM GRANULOCYTES NFR BLD AUTO: 0 % (ref 0–5)
LYMPHOCYTES # BLD: 1.4 K/UL (ref 0.5–4.6)
LYMPHOCYTES NFR BLD: 15 % (ref 13–44)
MCH RBC QN AUTO: 30.3 PG (ref 26.1–32.9)
MCHC RBC AUTO-ENTMCNC: 32.7 G/DL (ref 31.4–35)
MCV RBC AUTO: 92.7 FL (ref 79.6–97.8)
MONOCYTES # BLD: 0.7 K/UL (ref 0.1–1.3)
MONOCYTES NFR BLD: 7 % (ref 4–12)
NEUTS SEG # BLD: 7.2 K/UL (ref 1.7–8.2)
NEUTS SEG NFR BLD: 78 % (ref 43–78)
P-R INTERVAL, ECG05: 142 MS
PLATELET # BLD AUTO: 320 K/UL (ref 150–450)
PMV BLD AUTO: 9.9 FL (ref 10.8–14.1)
POTASSIUM SERPL-SCNC: 4.4 MMOL/L (ref 3.5–5.1)
PREALB SERPL-MCNC: 24.1 MG/DL (ref 18–35.7)
PTH-INTACT SERPL-MCNC: 233.5 PG/ML (ref 14–72)
Q-T INTERVAL, ECG07: 342 MS
QRS DURATION, ECG06: 80 MS
QTC CALCULATION (BEZET), ECG08: 409 MS
RBC # BLD AUTO: 3.43 M/UL (ref 4.05–5.25)
SERVICE CMNT-IMP: NORMAL
SODIUM SERPL-SCNC: 138 MMOL/L (ref 136–145)
VENTRICULAR RATE, ECG03: 86 BPM
WBC # BLD AUTO: 9.3 K/UL (ref 4.3–11.1)

## 2018-01-26 PROCEDURE — 65270000029 HC RM PRIVATE

## 2018-01-26 PROCEDURE — 74011250636 HC RX REV CODE- 250/636: Performed by: ORTHOPAEDIC SURGERY

## 2018-01-26 PROCEDURE — 84134 ASSAY OF PREALBUMIN: CPT | Performed by: ORTHOPAEDIC SURGERY

## 2018-01-26 PROCEDURE — 80048 BASIC METABOLIC PNL TOTAL CA: CPT | Performed by: FAMILY MEDICINE

## 2018-01-26 PROCEDURE — 36415 COLL VENOUS BLD VENIPUNCTURE: CPT | Performed by: FAMILY MEDICINE

## 2018-01-26 PROCEDURE — 77030008467 HC STPLR SKN COVD -B: Performed by: ORTHOPAEDIC SURGERY

## 2018-01-26 PROCEDURE — 0QS636Z REPOSITION RIGHT UPPER FEMUR WITH INTRAMEDULLARY INTERNAL FIXATION DEVICE, PERCUTANEOUS APPROACH: ICD-10-PCS | Performed by: ORTHOPAEDIC SURGERY

## 2018-01-26 PROCEDURE — C1713 ANCHOR/SCREW BN/BN,TIS/BN: HCPCS | Performed by: ORTHOPAEDIC SURGERY

## 2018-01-26 PROCEDURE — 74011250636 HC RX REV CODE- 250/636: Performed by: NURSE PRACTITIONER

## 2018-01-26 PROCEDURE — 82306 VITAMIN D 25 HYDROXY: CPT | Performed by: ORTHOPAEDIC SURGERY

## 2018-01-26 PROCEDURE — 87641 MR-STAPH DNA AMP PROBE: CPT | Performed by: ORTHOPAEDIC SURGERY

## 2018-01-26 PROCEDURE — C1769 GUIDE WIRE: HCPCS | Performed by: ORTHOPAEDIC SURGERY

## 2018-01-26 PROCEDURE — 74011250636 HC RX REV CODE- 250/636

## 2018-01-26 PROCEDURE — 77030008477 HC STYL SATN SLP COVD -A: Performed by: ANESTHESIOLOGY

## 2018-01-26 PROCEDURE — 77030014405 HC GD ROD RMR SYNT -C: Performed by: ORTHOPAEDIC SURGERY

## 2018-01-26 PROCEDURE — 86580 TB INTRADERMAL TEST: CPT | Performed by: FAMILY MEDICINE

## 2018-01-26 PROCEDURE — 74011000250 HC RX REV CODE- 250: Performed by: FAMILY MEDICINE

## 2018-01-26 PROCEDURE — 77030019908 HC STETH ESOPH SIMS -A: Performed by: ANESTHESIOLOGY

## 2018-01-26 PROCEDURE — 77030018836 HC SOL IRR NACL ICUM -A: Performed by: ORTHOPAEDIC SURGERY

## 2018-01-26 PROCEDURE — 73552 X-RAY EXAM OF FEMUR 2/>: CPT

## 2018-01-26 PROCEDURE — 77030002933 HC SUT MCRYL J&J -A: Performed by: ORTHOPAEDIC SURGERY

## 2018-01-26 PROCEDURE — 76060000034 HC ANESTHESIA 1.5 TO 2 HR: Performed by: ORTHOPAEDIC SURGERY

## 2018-01-26 PROCEDURE — 76010000162 HC OR TIME 1.5 TO 2 HR INTENSV-TIER 1: Performed by: ORTHOPAEDIC SURGERY

## 2018-01-26 PROCEDURE — 77030020782 HC GWN BAIR PAWS FLX 3M -B: Performed by: ANESTHESIOLOGY

## 2018-01-26 PROCEDURE — 74011250637 HC RX REV CODE- 250/637: Performed by: FAMILY MEDICINE

## 2018-01-26 PROCEDURE — 74011250637 HC RX REV CODE- 250/637: Performed by: ORTHOPAEDIC SURGERY

## 2018-01-26 PROCEDURE — 74011000302 HC RX REV CODE- 302: Performed by: FAMILY MEDICINE

## 2018-01-26 PROCEDURE — 97162 PT EVAL MOD COMPLEX 30 MIN: CPT

## 2018-01-26 PROCEDURE — 74011000250 HC RX REV CODE- 250

## 2018-01-26 PROCEDURE — 85025 COMPLETE CBC W/AUTO DIFF WBC: CPT | Performed by: FAMILY MEDICINE

## 2018-01-26 PROCEDURE — 77030011640 HC PAD GRND REM COVD -A: Performed by: ORTHOPAEDIC SURGERY

## 2018-01-26 PROCEDURE — 77030035168: Performed by: ORTHOPAEDIC SURGERY

## 2018-01-26 PROCEDURE — 99221 1ST HOSP IP/OBS SF/LOW 40: CPT | Performed by: PHYSICAL MEDICINE & REHABILITATION

## 2018-01-26 PROCEDURE — 83970 ASSAY OF PARATHORMONE: CPT | Performed by: ORTHOPAEDIC SURGERY

## 2018-01-26 PROCEDURE — 77030008703 HC TU ET UNCUF COVD -A: Performed by: ANESTHESIOLOGY

## 2018-01-26 PROCEDURE — 74011250636 HC RX REV CODE- 250/636: Performed by: FAMILY MEDICINE

## 2018-01-26 PROCEDURE — 74011250637 HC RX REV CODE- 250/637: Performed by: NURSE PRACTITIONER

## 2018-01-26 DEVICE — NAIL IM L380MM DIA11MM 130DEG LNG R PROX FEM GRN TI CANN: Type: IMPLANTABLE DEVICE | Site: HIP | Status: FUNCTIONAL

## 2018-01-26 DEVICE — IMPLANTABLE DEVICE: Type: IMPLANTABLE DEVICE | Site: HIP | Status: FUNCTIONAL

## 2018-01-26 RX ORDER — PROPOFOL 10 MG/ML
INJECTION, EMULSION INTRAVENOUS AS NEEDED
Status: DISCONTINUED | OUTPATIENT
Start: 2018-01-26 | End: 2018-01-26 | Stop reason: HOSPADM

## 2018-01-26 RX ORDER — LIDOCAINE HYDROCHLORIDE 20 MG/ML
INJECTION, SOLUTION EPIDURAL; INFILTRATION; INTRACAUDAL; PERINEURAL AS NEEDED
Status: DISCONTINUED | OUTPATIENT
Start: 2018-01-26 | End: 2018-01-26 | Stop reason: HOSPADM

## 2018-01-26 RX ORDER — ONDANSETRON 2 MG/ML
INJECTION INTRAMUSCULAR; INTRAVENOUS AS NEEDED
Status: DISCONTINUED | OUTPATIENT
Start: 2018-01-26 | End: 2018-01-26 | Stop reason: HOSPADM

## 2018-01-26 RX ORDER — DEXAMETHASONE SODIUM PHOSPHATE 4 MG/ML
INJECTION, SOLUTION INTRA-ARTICULAR; INTRALESIONAL; INTRAMUSCULAR; INTRAVENOUS; SOFT TISSUE AS NEEDED
Status: DISCONTINUED | OUTPATIENT
Start: 2018-01-26 | End: 2018-01-26 | Stop reason: HOSPADM

## 2018-01-26 RX ORDER — MAG HYDROX/ALUMINUM HYD/SIMETH 200-200-20
30 SUSPENSION, ORAL (FINAL DOSE FORM) ORAL
Status: DISCONTINUED | OUTPATIENT
Start: 2018-01-26 | End: 2018-01-31 | Stop reason: HOSPADM

## 2018-01-26 RX ORDER — DOCUSATE SODIUM 100 MG/1
100 CAPSULE, LIQUID FILLED ORAL 2 TIMES DAILY
Status: DISCONTINUED | OUTPATIENT
Start: 2018-01-26 | End: 2018-01-31 | Stop reason: HOSPADM

## 2018-01-26 RX ORDER — FENTANYL CITRATE 50 UG/ML
INJECTION, SOLUTION INTRAMUSCULAR; INTRAVENOUS AS NEEDED
Status: DISCONTINUED | OUTPATIENT
Start: 2018-01-26 | End: 2018-01-26 | Stop reason: HOSPADM

## 2018-01-26 RX ORDER — SODIUM CHLORIDE 0.9 % (FLUSH) 0.9 %
5-10 SYRINGE (ML) INJECTION EVERY 8 HOURS
Status: DISCONTINUED | OUTPATIENT
Start: 2018-01-26 | End: 2018-01-31 | Stop reason: HOSPADM

## 2018-01-26 RX ORDER — SODIUM CHLORIDE 0.9 % (FLUSH) 0.9 %
5-10 SYRINGE (ML) INJECTION AS NEEDED
Status: DISCONTINUED | OUTPATIENT
Start: 2018-01-26 | End: 2018-01-31 | Stop reason: HOSPADM

## 2018-01-26 RX ORDER — GLYCOPYRROLATE 0.2 MG/ML
INJECTION INTRAMUSCULAR; INTRAVENOUS AS NEEDED
Status: DISCONTINUED | OUTPATIENT
Start: 2018-01-26 | End: 2018-01-26 | Stop reason: HOSPADM

## 2018-01-26 RX ORDER — ENOXAPARIN SODIUM 100 MG/ML
30 INJECTION SUBCUTANEOUS EVERY 24 HOURS
Status: DISCONTINUED | OUTPATIENT
Start: 2018-01-27 | End: 2018-01-31 | Stop reason: HOSPADM

## 2018-01-26 RX ORDER — ROCURONIUM BROMIDE 10 MG/ML
INJECTION, SOLUTION INTRAVENOUS AS NEEDED
Status: DISCONTINUED | OUTPATIENT
Start: 2018-01-26 | End: 2018-01-26 | Stop reason: HOSPADM

## 2018-01-26 RX ORDER — OXYCODONE HYDROCHLORIDE 5 MG/1
5 TABLET ORAL
Status: DISCONTINUED | OUTPATIENT
Start: 2018-01-26 | End: 2018-01-31 | Stop reason: HOSPADM

## 2018-01-26 RX ORDER — SODIUM CHLORIDE, SODIUM LACTATE, POTASSIUM CHLORIDE, CALCIUM CHLORIDE 600; 310; 30; 20 MG/100ML; MG/100ML; MG/100ML; MG/100ML
75 INJECTION, SOLUTION INTRAVENOUS CONTINUOUS
Status: DISCONTINUED | OUTPATIENT
Start: 2018-01-26 | End: 2018-01-27

## 2018-01-26 RX ORDER — SODIUM CHLORIDE, SODIUM LACTATE, POTASSIUM CHLORIDE, CALCIUM CHLORIDE 600; 310; 30; 20 MG/100ML; MG/100ML; MG/100ML; MG/100ML
INJECTION, SOLUTION INTRAVENOUS
Status: DISCONTINUED | OUTPATIENT
Start: 2018-01-26 | End: 2018-01-26 | Stop reason: HOSPADM

## 2018-01-26 RX ORDER — NEOSTIGMINE METHYLSULFATE 1 MG/ML
INJECTION INTRAVENOUS AS NEEDED
Status: DISCONTINUED | OUTPATIENT
Start: 2018-01-26 | End: 2018-01-26 | Stop reason: HOSPADM

## 2018-01-26 RX ORDER — HYDROMORPHONE HYDROCHLORIDE 2 MG/ML
0.5 INJECTION, SOLUTION INTRAMUSCULAR; INTRAVENOUS; SUBCUTANEOUS
Status: DISCONTINUED | OUTPATIENT
Start: 2018-01-26 | End: 2018-01-31 | Stop reason: HOSPADM

## 2018-01-26 RX ORDER — ACETAMINOPHEN 325 MG/1
650 TABLET ORAL EVERY 8 HOURS
Status: DISCONTINUED | OUTPATIENT
Start: 2018-01-26 | End: 2018-01-31 | Stop reason: HOSPADM

## 2018-01-26 RX ORDER — ONDANSETRON 2 MG/ML
4 INJECTION INTRAMUSCULAR; INTRAVENOUS
Status: DISCONTINUED | OUTPATIENT
Start: 2018-01-26 | End: 2018-01-31 | Stop reason: HOSPADM

## 2018-01-26 RX ORDER — FERROUS SULFATE, DRIED 160(50) MG
1 TABLET, EXTENDED RELEASE ORAL
Status: DISCONTINUED | OUTPATIENT
Start: 2018-01-26 | End: 2018-01-31 | Stop reason: HOSPADM

## 2018-01-26 RX ADMIN — ONDANSETRON 4 MG: 2 INJECTION INTRAMUSCULAR; INTRAVENOUS at 09:45

## 2018-01-26 RX ADMIN — ONDANSETRON 4 MG: 2 INJECTION INTRAMUSCULAR; INTRAVENOUS at 10:45

## 2018-01-26 RX ADMIN — DULOXETINE HYDROCHLORIDE 60 MG: 60 CAPSULE, DELAYED RELEASE ORAL at 00:18

## 2018-01-26 RX ADMIN — ROCURONIUM BROMIDE 30 MG: 10 INJECTION, SOLUTION INTRAVENOUS at 09:05

## 2018-01-26 RX ADMIN — PANTOPRAZOLE SODIUM 40 MG: 40 TABLET, DELAYED RELEASE ORAL at 06:13

## 2018-01-26 RX ADMIN — Medication 2 G: at 09:10

## 2018-01-26 RX ADMIN — SODIUM CHLORIDE, SODIUM LACTATE, POTASSIUM CHLORIDE, CALCIUM CHLORIDE: 600; 310; 30; 20 INJECTION, SOLUTION INTRAVENOUS at 08:58

## 2018-01-26 RX ADMIN — DOCUSATE SODIUM 100 MG: 100 CAPSULE, LIQUID FILLED ORAL at 17:17

## 2018-01-26 RX ADMIN — DULOXETINE HYDROCHLORIDE 60 MG: 60 CAPSULE, DELAYED RELEASE ORAL at 20:45

## 2018-01-26 RX ADMIN — FENTANYL CITRATE 25 MCG: 50 INJECTION, SOLUTION INTRAMUSCULAR; INTRAVENOUS at 09:15

## 2018-01-26 RX ADMIN — SODIUM CHLORIDE, SODIUM LACTATE, POTASSIUM CHLORIDE, AND CALCIUM CHLORIDE 75 ML/HR: 600; 310; 30; 20 INJECTION, SOLUTION INTRAVENOUS at 07:28

## 2018-01-26 RX ADMIN — ROPINIROLE HYDROCHLORIDE 5 MG: 2 TABLET, FILM COATED ORAL at 00:18

## 2018-01-26 RX ADMIN — Medication 10 ML: at 00:26

## 2018-01-26 RX ADMIN — CARVEDILOL 3.12 MG: 3.12 TABLET, FILM COATED ORAL at 17:17

## 2018-01-26 RX ADMIN — CARVEDILOL 3.12 MG: 3.12 TABLET, FILM COATED ORAL at 06:24

## 2018-01-26 RX ADMIN — FENTANYL CITRATE 12.5 MCG: 50 INJECTION, SOLUTION INTRAMUSCULAR; INTRAVENOUS at 09:43

## 2018-01-26 RX ADMIN — ROPINIROLE HYDROCHLORIDE 5 MG: 2 TABLET, FILM COATED ORAL at 20:57

## 2018-01-26 RX ADMIN — ACETAMINOPHEN 650 MG: 325 TABLET, FILM COATED ORAL at 00:19

## 2018-01-26 RX ADMIN — Medication 10 ML: at 20:45

## 2018-01-26 RX ADMIN — OXYBUTYNIN CHLORIDE 5 MG: 5 TABLET ORAL at 20:45

## 2018-01-26 RX ADMIN — SODIUM CHLORIDE, SODIUM LACTATE, POTASSIUM CHLORIDE, AND CALCIUM CHLORIDE 75 ML/HR: 600; 310; 30; 20 INJECTION, SOLUTION INTRAVENOUS at 11:00

## 2018-01-26 RX ADMIN — LIDOCAINE HYDROCHLORIDE 60 MG: 20 INJECTION, SOLUTION EPIDURAL; INFILTRATION; INTRACAUDAL; PERINEURAL at 09:05

## 2018-01-26 RX ADMIN — OXYCODONE HYDROCHLORIDE 5 MG: 5 TABLET ORAL at 18:00

## 2018-01-26 RX ADMIN — FENTANYL CITRATE 12.5 MCG: 50 INJECTION, SOLUTION INTRAMUSCULAR; INTRAVENOUS at 09:40

## 2018-01-26 RX ADMIN — PROPOFOL 110 MG: 10 INJECTION, EMULSION INTRAVENOUS at 09:05

## 2018-01-26 RX ADMIN — NEOSTIGMINE METHYLSULFATE 2 MG: 1 INJECTION INTRAVENOUS at 09:45

## 2018-01-26 RX ADMIN — GLYCOPYRROLATE 0.2 MG: 0.2 INJECTION INTRAMUSCULAR; INTRAVENOUS at 09:45

## 2018-01-26 RX ADMIN — TUBERCULIN PURIFIED PROTEIN DERIVATIVE 5 UNITS: 5 INJECTION INTRADERMAL at 00:24

## 2018-01-26 RX ADMIN — CALCIUM CARBONATE 500 MG (1,250 MG)-VITAMIN D3 200 UNIT TABLET 1 TABLET: at 17:17

## 2018-01-26 RX ADMIN — Medication 10 ML: at 06:14

## 2018-01-26 RX ADMIN — OXYBUTYNIN CHLORIDE 5 MG: 5 TABLET ORAL at 00:19

## 2018-01-26 RX ADMIN — DEXAMETHASONE SODIUM PHOSPHATE 10 MG: 4 INJECTION, SOLUTION INTRA-ARTICULAR; INTRALESIONAL; INTRAMUSCULAR; INTRAVENOUS; SOFT TISSUE at 09:10

## 2018-01-26 RX ADMIN — ACETAMINOPHEN 650 MG: 325 TABLET, FILM COATED ORAL at 06:13

## 2018-01-26 RX ADMIN — OXYCODONE HYDROCHLORIDE 5 MG: 5 TABLET ORAL at 00:19

## 2018-01-26 RX ADMIN — DOCUSATE SODIUM 100 MG: 100 CAPSULE, LIQUID FILLED ORAL at 00:00

## 2018-01-26 RX ADMIN — SODIUM CHLORIDE 100 ML/HR: 900 INJECTION, SOLUTION INTRAVENOUS at 00:23

## 2018-01-26 RX ADMIN — WATER 1 G: 1 INJECTION INTRAMUSCULAR; INTRAVENOUS; SUBCUTANEOUS at 17:17

## 2018-01-26 RX ADMIN — ACETAMINOPHEN 650 MG: 325 TABLET, FILM COATED ORAL at 20:45

## 2018-01-26 NOTE — ANESTHESIA PREPROCEDURE EVALUATION
Anesthetic History     PONV          Review of Systems / Medical History  Patient summary reviewed and pertinent labs reviewed    Pulmonary  Within defined limits                 Neuro/Psych         Dementia (Lewy body)     Cardiovascular    Hypertension: well controlled      CHF    Past MI, CAD, cardiac stents (2013) and hyperlipidemia    Exercise tolerance: <4 METS  Comments: Most recent ECHO shows EF 60-65%  Denies CP  Bedbound   GI/Hepatic/Renal                Endo/Other        Arthritis     Other Findings              Physical Exam    Airway  Mallampati: II  TM Distance: 4 - 6 cm  Neck ROM: normal range of motion   Mouth opening: Normal     Cardiovascular    Rhythm: regular  Rate: normal         Dental    Dentition: Poor dentition, Upper partial plate and Lower partial plate     Pulmonary  Breath sounds clear to auscultation               Abdominal  GI exam deferred       Other Findings            Anesthetic Plan    ASA: 3  Anesthesia type: general          Induction: Intravenous  Anesthetic plan and risks discussed with: Patient and Son / Daughter      Severe dementia.  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

## 2018-01-26 NOTE — PROGRESS NOTES
ORTHO:    PATIENT TO BE ADMITTED TO ROOM 720 FOR RIGHT HIP FRACTURE. SURGERY PLANNED FOR TOMORROW WITH DR. LEBRON.    NPO AFTER MIDNIGHT.

## 2018-01-26 NOTE — PROGRESS NOTES
Primary Nurse Bravo Caldwell RN and Armond Martinez RN performed a dual skin assessment on this patient Impairment noted- see wound doc flow sheet

## 2018-01-26 NOTE — ED PROVIDER NOTES
HPI Comments: Patient is an 80-year-old female who is coming in after having called the nursing home. She has dementia and cannot clearly coming In. He thought was unwitnessed. The patient has been complaining of a lot of pain in her right hip. She also complaining of a little bit of pain in the front of her head. Family states that she has fallen and broken her left hip previously and she has never had any problems with the right. Patient is a 80 y.o. female presenting with fall. The history is provided by the patient, the EMS personnel and medical records.    Fall          Past Medical History:   Diagnosis Date    Acute myocardial infarction of other anterior wall, initial episode of care [facility]) 8/30/2012    MI - Had a stent placed August 37,0483    Acute systolic congestive heart failure- in setting of anterior MI, EF 35-40% 2/71/7113    Acute systolic heart failure (HCC)     resolved at discharge, EF 35% at time of MI, 55% by echo prior to discharge    ARF (acute renal failure) ([address] 9/29/2012    Arthritis     CAD (coronary artery disease)     MI    Carotid artery stenosis without cerebral infarction     Chronic pain- chronic narcotic use for spinal stenosis 8/30/2012    Coronary atherosclerosis of native coronary artery 3/16/2013    Diarrhea 10/2/2012    Dyslipidemia 8/30/2012    Dyspnea     unspecified    Essential hypertension, benign 8/30/2012    Heart failure (HCC)     denies heart failure    Hyperlipidemia     other unsp dyslipidemia    Hypertension     controlled, benign    Ischemic colitis ([address]     Lower GI bleed 3/12/2015    Myocardial infarction, anterolateral wall, subsequent care ([address]     s/p 2.5mm Xience stent to focal lesion in mid LAD 8/2012    N&V (nausea and vomiting) 3/16/2013    Sepsis ([address] 2/12/2014    Spinal stenosis 8/30/2012    ST elevation (STEMI) myocardial infarction involving left anterior descending coronary artery (HCC)     Syncope and collapse 2/12/2014       Past Surgical History:   Procedure Laterality Date    CARDIAC SURG PROCEDURE UNLIST      stent    HX APPENDECTOMY      HX CHOLECYSTECTOMY      HX CORONARY STENT PLACEMENT      2012 for MI    HX GYN      hysterectomy    HX ORTHOPAEDIC      moreno knees, ankle and spinal cord stimulator    HX OTHER SURGICAL      cord stimulator for pain control removed         Family History:   Problem Relation Age of Onset    Hypertension Mother     Stroke Mother     Hypertension Father        Social History     Social History    Marital status:      Spouse name: N/A    Number of children: N/A    Years of education: N/A     Occupational History    Not on file. Social History Main Topics    Smoking status: Never Smoker    Smokeless tobacco: Never Used    Alcohol use No    Drug use: No    Sexual activity: Not on file     Other Topics Concern    Not on file     Social History Narrative    Lives at home independently    Has 2 children        Has living will, DNR    [place],                  ALLERGIES: Morphine; Other medication; and Sulfa (sulfonamide antibiotics)    Review of Systems   Unable to perform ROS: Dementia       Vitals:    01/25/18 1900   BP: (!) 199/93   Pulse: 87   Resp: 18   Temp: 98.4 °F (36.9 °C)   SpO2: 99%   Weight: 54.9 kg (121 lb)   Height: 4' 8\" (1.422 m)            Physical Exam   Constitutional: She is oriented to person, place, and time. She appears well-developed and well-nourished. No distress. HENT:   Head: Normocephalic. Mild swelling to the front of the head. Eyes: Conjunctivae are normal. Right eye exhibits no discharge. Left eye exhibits no discharge. Neck: Neck supple. Pulmonary/Chest: Effort normal. No stridor. No respiratory distress. She has no wheezes. She has no rales. She exhibits no tenderness. Abdominal: Soft. Bowel sounds are normal. She exhibits no distension and no mass. There is no tenderness.  There is no rebound and no guarding. Musculoskeletal:   Right leg is shorter than the left pain with any movement towards the hip. Neurological: She is alert and oriented to person, place, and time. No cranial nerve deficit. Coordination normal.   No focal weakness speech normal   Skin: Skin is warm and dry. No rash noted. No erythema. No pallor. Psychiatric: She has a normal mood and affect. Her behavior is normal.   Nursing note and vitals reviewed. MDM  Number of Diagnoses or Management Options  Closed fracture of right hip, initial encounter [facility]):   Diagnosis management comments: Patient has a right intertrochanteric hip fracture I have called orthopedics and hospitalist for admission and will continue treatment patient's pain. Lin Aleln MD; 1/25/2018 @8:26 PM Voice dictation software was used during the making of this note. This software is not perfect and grammatical and other typographical errors may be present.   This note has not been proofread for errors.  ===================================================================        Amount and/or Complexity of Data Reviewed  Clinical lab tests: ordered and reviewed (Results for orders placed or performed during the hospital encounter of 01/25/18  -CBC WITH AUTOMATED DIFF       Result                                            Value                         Ref Range                       WBC                                               13.7 (H)                      4.3 - 11.1 K/uL                 RBC                                               3.91 (L)                      4.05 - 5.25 M/uL                HGB                                               12.1                          11.7 - 15.4 g/dL                HCT                                               36.1                          35.8 - 46.3 %                   MCV                                               92.3                          79.6 - 97.8 FL                  MCH 30.9                          26.1 - 32.9 PG                  MCHC                                              33.5                          31.4 - 35.0 g/dL                RDW                                               13.0                          11.9 - 14.6 %                   PLATELET                                          353                           150 - 450 K/uL                  MPV                                               9.9 (L)                       10.8 - 14.1 FL                  DF                                                AUTOMATED                                                     NEUTROPHILS                                       85 (H)                        43 - 78 %                       LYMPHOCYTES                                       8 (L)                         13 - 44 %                       MONOCYTES                                         6                             4.0 - 12.0 %                    EOSINOPHILS                                       1                             0.5 - 7.8 %                     BASOPHILS                                         0                             0.0 - 2.0 %                     IMMATURE GRANULOCYTES                             0                             0.0 - 5.0 %                     ABS. NEUTROPHILS                                  11.5 (H)                      1.7 - 8.2 K/UL                  ABS. LYMPHOCYTES                                  1.2                           0.5 - 4.6 K/UL                  ABS. MONOCYTES                                    0.9                           0.1 - 1.3 K/UL                  ABS. EOSINOPHILS                                  0.1                           0.0 - 0.8 K/UL                  ABS. BASOPHILS                                    0.0                           0.0 - 0.2 K/UL                  ABS. IMM.  GRANS.                                  0.1 0.0 - 0.5 K/UL             -METABOLIC PANEL, COMPREHENSIVE       Result                                            Value                         Ref Range                       Sodium                                            137                           136 - 145 mmol/L                Potassium                                         4.0                           3.5 - 5.1 mmol/L                Chloride                                          101                           98 - 107 mmol/L                 CO2                                               23                            21 - 32 mmol/L                  Anion gap                                         13                            7 - 16 mmol/L                   Glucose                                           121 (H)                       65 - 100 mg/dL                  BUN                                               30 (H)                        8 - 23 MG/DL                    Creatinine                                        1.02 (H)                      0.6 - 1.0 MG/DL                 GFR est AA                                        >60                           >60 ml/min/1.73m2               GFR est non-AA                                    55 (L)                        >60 ml/min/1.73m2               Calcium                                           9.8                           8.3 - 10.4 MG/DL                Bilirubin, total                                  0.4                           0.2 - 1.1 MG/DL                 ALT (SGPT)                                        21                            12 - 65 U/L                     AST (SGOT)                                        22                            15 - 37 U/L                     Alk. phosphatase                                  105                           50 - 136 U/L                    Protein, total                                    7.4 6.3 - 8.2 g/dL                  Albumin                                           3.3                           3.2 - 4.6 g/dL                  Globulin                                          4.1 (H)                       2.3 - 3.5 g/dL                  A-G Ratio                                         0.8 (L)                       1.2 - 3.5                  -PROTHROMBIN TIME + INR       Result                                            Value                         Ref Range                       Prothrombin time                                  12.6                          11.5 - 14.5 sec                 INR                                               1.0                                                     )  Tests in the radiology section of CPT®: ordered and reviewed (Xr Chest Sngl V    Result Date: 1/25/2018  AP chest radiograph History: fall, right leg shortening, 86 years Female Comparison: Chest radiograph February 27, 2017 Findings:   Normal cardiomediastinal silhouette. Mild diffuse interstitial prominence unchanged, nonspecific. Mild subsegmental atelectasis bilateral lung bases. No evidence of pneumothorax, pleural effusion, or air space consolidation. Visualized soft tissue and osseous structures otherwise unremarkable. Impression:  No significant interval change. Xr Hip Rt W Or Wo Pelv 2-3 Vws    Result Date: 1/25/2018  Exam:  AP pelvis and right hip, right femur radiographs History:  pain, fall, right leg shortening, 86 years Female Comparison: None available Findings: There is an acute mildly displaced and angulated right proximal femoral intertrochanteric fracture. The right femoral head is otherwise well seated within the acetabulum. Intramedullary emily fixation of the left proximal femur appears in anatomic alignment. The visualized pelvis appears intact. Evidence of right knee total arthroplasty. The visualized distal right femur appears intact.   There appears to be an enthesophyte arising from the right ischial tuberosity. Mild diffuse osteopenia. Visualized soft tissues otherwise unremarkable. Impression: Acute mildly displaced right proximal femoral intertrochanteric fracture. Xr Femur Rt 2 Vs    Result Date: 1/25/2018  Exam:  AP pelvis and right hip, right femur radiographs History:  pain, fall, right leg shortening, 86 years Female Comparison: None available Findings: There is an acute mildly displaced and angulated right proximal femoral intertrochanteric fracture. The right femoral head is otherwise well seated within the acetabulum. Intramedullary emily fixation of the left proximal femur appears in anatomic alignment. The visualized pelvis appears intact. Evidence of right knee total arthroplasty. The visualized distal right femur appears intact. There appears to be an enthesophyte arising from the right ischial tuberosity. Mild diffuse osteopenia. Visualized soft tissues otherwise unremarkable. Impression: Acute mildly displaced right proximal femoral intertrochanteric fracture. Ct Head Wo Cont    Result Date: 1/25/2018  Examination: CT scan of the brain without contrast. History: fall head injury, 80 years Female Pt arrives via EMS from [facility] for an unwitnessed fall and was found next to her bed Technique: 5 mm axial imaging of the brain from the posterior fossa to the vertex. Radiation dose reduction techniques were used for this study:  Our CT scanners use one or all of the following: Automated exposure control, adjustment of the mA and/or kVp according to patient's size, iterative reconstruction. Comparison:  CT brain April 18, 2015 Findings: Persistent probable moderate microvascular disease with senescent related atrophy. The ventricles, sulci are age-appropriate. No intracranial hemorrhage or extra-axial collection is identified. No evidence of acute infarct. No mass effect or midline shift is present.  Basal cisterns are intact. The visualized paranasal sinuses and mastoid air cells are clear. The orbits, bones, and soft tissues are normal in appearance. Image quality somewhat degraded by motion artifact. IMPRESSION:  No acute intracranial abnormality. Ct Spine Cerv Wo Cont    Result Date: 1/25/2018  Exam:  CT cervical spine without contrast History: fall, 80 years Female Pt arrives via EMS from [facility] for an unwitnessed fall and was found next to her bed Technique: Standard departmental protocol CT of the cervical spine was performed without intravenous contrast administration. Coronal and sagittal reformats were obtained. Radiation dose reduction techniques were used for this study: Our CT scanners use one or all of the following: Automated exposure control, adjustment of the mA and/or kVp according to patient's size, iterative reconstruction. Comparison: CT cervical spine March 16, 2013 Findings: Persistent mild grade 1 anterolisthesis of the C7 vertebral body on T1. Mild anterior loss of vertebral body height of C6 and C7 and significant loss of disc space height C6-T1 with small anterior and posterior endplate osteophytes appear progressed since prior. .  No evidence of acute fracture. No evidence of significant spinal canal stenosis. Mild diffuse osteopenia. Visualized prevertebral and paravertebral soft tissues unremarkable. Image quality slightly degraded by motion artifact. Impression:  No definite evidence of acute injury.   Interval progression of mild lower cervical degenerative disc disease as above.   )      ED Course       Procedures

## 2018-01-26 NOTE — OP NOTES
Operative Report     Patient: Hernandez Johnson MRN: [id number]  SSN: [id number]    YOB: 1932  Age: 80 y.o. Sex: female      Indications: Hernandez Johnson was admitted to the hospital with a brief history of right intertrochanteric hip fracture. The patient now presents for ORIF. Date of Surgery: 1/26/2018     Preoperative Diagnosis:  right hip fracture    Postoperative Diagnosis: Right intertrochanteric femur fracture    Surgeon(s) and Role:     * Giuseppe Ennis MD - Primary     Anesthesia: Spinal    Procedures: Procedure(s): FEMUR INSERTION INTRA MEDULLARY NAIL       Procedure in Detail:  The patient was brought to the operative suite and placed in supine position. After adequate anesthesia was achieved, the patient was placed upon the fracture table. Peroneal post and foot holders were well padded. The patient underwent a closed reduction of the right intertrochanteric hip fracture. This was achieved by longitudinal traction, internal rotation, and adduction. AP and lateral fluoroscopic images confirmed the fracture was now reduced anatomically. right hip was then prepped and draped in the usual sterile fashion. A 3 cm incision was made over the tip of the greater trochanter. Hemostasis was achieved with Bovie cautery. Guide pin for a Synthes trochanteric fixation nail was inserted through the tip of the trochanter, across the fracture site, and into the canal of the femur. Its position was confirmed by fluoroscopy. The proximal reamer was then passed by hand over this guide pin in order to open up a proximal portal.  Guide pin and reamer were removed a ball-tip guide wire was inserted through this portal, across the fracture site, and down to the epiphyseal scar of the knee. The position of the guide wire was confirmed by AP and lateral fluoroscopic images. The 11.5 mm reamer was passed as a sound, and reaming was only performed by power if necessary.   The Synthes trochanteric fixation nail was then passed over the guide wire without difficulty. The guide wire was removed and the targeting guide was inserted through a stab wound in the lateral aspect of the proximal femur. Guide pin was then inserted through the lateral cortex into the neck and head. It stopped just short of the subchondral bone. AP and lateral fluoroscopic images confirmed that the position of the guide pin was centered in the head. Depth gauge was used to determine the appropriate length helical blade. The reamers were passed over the guide pin in order to open up the lateral cortex neck and head. The appropriate length helical blade was then passed over the guide wire without difficulty. The set screw was passed from above with a spring wrench. Traction was removed. The position of the helical blade was confirmed by fluoroscopy. The fracture was then compressed to a stable position, using the proximal targeting guide. At this point, the targeting guides were removed. AP and lateral fluoroscopic images confirmed the fracture was reduced anatomically. Wounds were then irrigated with normal saline and closed in layers. Sterile, compressive dressings were applied. The patient was then removed from the fracture table and transferred to the postanesthesia care unit, alert and oriented, under the care of anesthesia. Estimated Blood Loss: 100 CC    Specimens: * No specimens in log *      Implants:   Implant Name Type Inv.  Item Serial No.  Lot No. LRB No. Used Action   NAIL PATRICIA 130D [id number] RT -- TFNA STRL - [id number]  NAIL PATRICIA 130D [id number] RT -- [place]  [facility] [id number] Right 1 Implanted   BLADE HELCL ADV TFNA 100MM -- STRL - [id number]   BLADE HELCL ADV TFNA 100MM -- STRL   [employer] [place] [id number] Right [id number] Implanted       Tourniquet Time: * No tourniquets in log *     Closure: Primary    Complications: None     Signed By: Shaye Palacios MD     January 26, 2018

## 2018-01-26 NOTE — PROGRESS NOTES
Initial visit by  to convey care and concern and encourage patient that  services are available if desired. No needs were voiced during the visit. Provided business card for future reference.      [address]  Board Certified

## 2018-01-26 NOTE — PROGRESS NOTES
Problem: Falls - Risk of  Goal: *Absence of Falls  Document Cali Fall Risk and appropriate interventions in the flowsheet.    Outcome: Progressing Towards Goal  Fall Risk Interventions:  Mobility Interventions: Bed/chair exit alarm, Communicate number of staff needed for ambulation/transfer, Patient to call before getting OOB, OT consult for ADLs, PT Consult for mobility concerns, Strengthening exercises (ROM-active/passive), PT Consult for assist device competence, Utilize walker, cane, or other assitive device, Utilize gait belt for transfers/ambulation    Mentation Interventions: Adequate sleep, hydration, pain control, Bed/chair exit alarm, Door open when patient unattended, Evaluate medications/consider consulting pharmacy, Eyeglasses and hearing aids, Familiar objects from home, Family/sitter at bedside, Increase mobility, More frequent rounding, Reorient patient, Room close to nurse's station, Self-releasing belt, Toileting rounds, Update white board    Medication Interventions: Assess postural VS orthostatic hypotension, Bed/chair exit alarm, Evaluate medications/consider consulting pharmacy, Patient to call before getting OOB, Utilize gait belt for transfers/ambulation, Teach patient to arise slowly    Elimination Interventions: Bed/chair exit alarm, Call light in reach, Elevated toilet seat, Patient to call for help with toileting needs, Toileting schedule/hourly rounds, Toilet paper/wipes in reach    History of Falls Interventions: Bed/chair exit alarm, Consult care management for discharge planning, Door open when patient unattended, Evaluate medications/consider consulting pharmacy, Investigate reason for fall, Room close to nurse's station, Utilize gait belt for transfer/ambulation

## 2018-01-26 NOTE — PROGRESS NOTES
TRANSFER - IN REPORT:    Verbal report received from Trey Ruelas RN(name) on Hina Mood  being received from ED(unit) for routine progression of care      Report consisted of patients Situation, Background, Assessment and   Recommendations(SBAR). Information from the following report(s) SBAR, Kardex and ED Summary was reviewed with the receiving nurse. Opportunity for questions and clarification was provided. Assessment completed upon patients arrival to unit (720) and care assumed.

## 2018-01-26 NOTE — H&P
HOSPITALIST H&P/CONSULT  NAME:  Thomas Merida   Age:  80 y.o.  :   1932   MRN:   [id number]  PCP: Arturo Payton MD  Treatment Team: Attending Provider: Juan C Antunez MD; Primary Nurse: Harmony Allen RN    Prior     CC: Reason for admission is: rt hip fx     HPI:   Patient history was obtained from the ER provider prior to seeing the patient. Patient is a 80 y.o. female who presents to the ER after having a fall at her nursing home. She has lewy body dementia and cannot provide history about what happened. Family is here and states that she has actually fallen a few times. She was recently moved into a memory care unit for closer observation. She has had pain in rt hip since fall, and unable to bear weight. ER workup shows a hip fx. No known LOC. ROS:  All systems have been reviewed and are negative except as stated in HPI or elsewhere.       Past Medical History:   Diagnosis Date    Acute myocardial infarction of other anterior wall, initial episode of care [facility]) 2012    MI - Had a stent placed     Acute systolic congestive heart failure- in setting of anterior MI, EF 35-40%     Acute systolic heart failure (HCC)     resolved at discharge, EF 35% at time of MI, 55% by echo prior to discharge    ARF (acute renal failure) ([facility] [address] 2012    Arthritis     CAD (coronary artery disease)     MI    Carotid artery stenosis without cerebral infarction     Chronic pain- chronic narcotic use for spinal stenosis 2012    Coronary atherosclerosis of native coronary artery 3/16/2013    Diarrhea 10/2/2012    Dyslipidemia 2012    Dyspnea     unspecified    Essential hypertension, benign 2012    Heart failure ([address]     denies heart failure    Hyperlipidemia     other unsp dyslipidemia    Hypertension     controlled, benign    Ischemic colitis ([address]     Lower GI bleed 3/12/2015    Myocardial infarction, anterolateral wall, subsequent care [facility])     s/p 2.5mm Xience stent to focal lesion in mid LAD 8/2012    N&V (nausea and vomiting) 3/16/2013    Sepsis ([address] 2/12/2014    Spinal stenosis 8/30/2012    ST elevation (STEMI) myocardial infarction involving left anterior descending coronary artery (HCC)     Syncope and collapse 2/12/2014      Past Surgical History:   Procedure Laterality Date    CARDIAC SURG PROCEDURE UNLIST      stent    HX APPENDECTOMY      HX CHOLECYSTECTOMY      HX CORONARY STENT PLACEMENT      2012 for MI    HX GYN      hysterectomy    HX ORTHOPAEDIC      moreno knees, ankle and spinal cord stimulator    HX OTHER SURGICAL      cord stimulator for pain control removed      Social History   Substance Use Topics    Smoking status: Never Smoker    Smokeless tobacco: Never Used    Alcohol use No      Family History   Problem Relation Age of Onset    Hypertension Mother     Stroke Mother     Hypertension Father        FH Reviewed and non-contributory to admitting diagnosis    Allergies   Allergen Reactions    Morphine Nausea and Vomiting    Other Medication Other (comments)     \"Took steroids this week and made my face red. \"    Sulfa (Sulfonamide Antibiotics) Rash      Prior to Admission Medications   Prescriptions Last Dose Informant Patient Reported? Taking? B.infantis-B.ani-B.long-B.bifi (PROBIOTIC 4X) 10-15 mg TbEC   Yes No   Sig: Take 1 Tab by mouth daily. DULoxetine (CYMBALTA) 60 mg capsule   Yes No   Sig: Take 60 mg by mouth nightly. acetaminophen (TYLENOL) 500 mg tablet   Yes No   Sig: Take 500 mg by mouth every six (6) hours as needed for Pain. Indications: Pain   aspirin 81 mg chewable tablet   No No   Sig: Take 1 Tab by mouth daily. Resume Aspirin on March 3rd (  Will complete 7 days off   ASA )   carvedilol (COREG) 3.125 mg tablet   No No   Sig: Take 1 Tab by mouth two (2) times daily (with meals). cyanocobalamin 1,000 mcg tablet   Yes No   Sig: Take 1,000 mcg by mouth daily.  Indications: PREVENTION OF VITAMIN B12 DEFICIENCY   diclofenac (VOLTAREN) 1 % gel   Yes No   Sig: Apply 2 g to affected area four (4) times daily. Indications: OSTEOARTHRITIS   docusate sodium (COLACE) 100 mg capsule   Yes No   Sig: Take 100 mg by mouth three (3) times daily. Indications: Constipation   esomeprazole (NEXIUM) 40 mg capsule   Yes No   Sig: Take 40 mg by mouth daily. ferrous sulfate (IRON) 325 mg (65 mg iron) EC tablet   No No   Sig: Take 1 Tab by mouth two (2) times a day. lisinopril (PRINIVIL, ZESTRIL) 10 mg tablet   Yes No   Sig: Take 10 mg by mouth daily. nitroglycerin (NITROSTAT) 0.4 mg SL tablet   No No   Si Tab by SubLINGual route every five (5) minutes as needed for Chest Pain. ondansetron hcl (ZOFRAN, AS HYDROCHLORIDE,) 4 mg tablet   Yes No   Sig: Take 4 mg by mouth every eight (8) hours as needed for Nausea. oxyCODONE IR (ROXICODONE) 5 mg immediate release tablet   No No   Sig: Take 1 Tab by mouth every four (4) hours as needed for Pain. Max Daily Amount: 30 mg. Patient taking differently: Take 5 mg by mouth every four (4) hours as needed for Pain. Takes 10mg in am, 5mg mid day, 10mg at HS, and 5mg if needed during the night. oxybutynin (DITROPAN) 5 mg tablet   Yes No   Sig: Take 5 mg by mouth two (2) times a day. rOPINIRole (REQUIP) 5 mg tablet   Yes No   Sig: Take 5 mg by mouth nightly. rosuvastatin (CRESTOR) 5 mg tablet   Yes No   Sig: Take 5 mg by mouth every Monday, Wednesday, Friday. Facility-Administered Medications: None         Objective:   Patient Vitals for the past 24 hrs:   Temp Pulse Resp BP SpO2   18 1900 98.4 °F (36.9 °C) 87 18 (!) 199/93 99 %     No intake or output data in the 24 hours ending 18   Temp (24hrs), Av.4 °F (36.9 °C), Min:98.4 °F (36.9 °C), Max:98.4 °F (36.9 °C)    Oxygen Therapy  O2 Sat (%): 99 % (18)  O2 Device: Room air (18)   Body mass index is 27.13 kg/(m^2).     Physical Exam:    General:    WD and WN, seems to still be in pain with moaning, just had pain meds about 10min ago   Head:   Normocephalic, without obvious abnormality, atraumatic. Eyes:  PERRL; EOMI; sclera normal/non-icteric  ENT:  Hearing is diminished. Oropharynx is clear with tacky mucous membranes   Resp:    Clear/diminished to auscultation bilaterally. No Wheezing or Rhonchi. Resp are even and unlabored  Heart[de-identified]  Regular rate and rhythm,  no murmur,   No LE edema  Abdomen:   Soft, non-tender. Not distended. Bowel sounds normal.  hepato-splenomegaly -none  Musc/SK: Muscle strength is fair and tone normal; No cyanosis. No clubbing  Skin:     Texture, turgor normal. No significant rashes or lesions. Neurologic: CN II - XII are grossly intact - other than Eye exam as noted above  Psych: Alert and partially oriented x 4;  Judgement and insight are impaired     Data Review:   Recent Results (from the past 24 hour(s))   CBC WITH AUTOMATED DIFF    Collection Time: 01/25/18  7:13 PM   Result Value Ref Range    WBC 13.7 (H) 4.3 - 11.1 K/uL    RBC 3.91 (L) 4.05 - 5.25 M/uL    HGB 12.1 11.7 - 15.4 g/dL    HCT 36.1 35.8 - 46.3 %    MCV 92.3 79.6 - 97.8 FL    MCH 30.9 26.1 - 32.9 PG    MCHC 33.5 31.4 - 35.0 g/dL    RDW 13.0 11.9 - 14.6 %    PLATELET 620 581 - 061 K/uL    MPV 9.9 (L) 10.8 - 14.1 FL    DF AUTOMATED      NEUTROPHILS 85 (H) 43 - 78 %    LYMPHOCYTES 8 (L) 13 - 44 %    MONOCYTES 6 4.0 - 12.0 %    EOSINOPHILS 1 0.5 - 7.8 %    BASOPHILS 0 0.0 - 2.0 %    IMMATURE GRANULOCYTES 0 0.0 - 5.0 %    ABS. NEUTROPHILS 11.5 (H) 1.7 - 8.2 K/UL    ABS. LYMPHOCYTES 1.2 0.5 - 4.6 K/UL    ABS. MONOCYTES 0.9 0.1 - 1.3 K/UL    ABS. EOSINOPHILS 0.1 0.0 - 0.8 K/UL    ABS. BASOPHILS 0.0 0.0 - 0.2 K/UL    ABS. IMM.  GRANS. 0.1 0.0 - 0.5 K/UL   METABOLIC PANEL, COMPREHENSIVE    Collection Time: 01/25/18  7:13 PM   Result Value Ref Range    Sodium 137 136 - 145 mmol/L    Potassium 4.0 3.5 - 5.1 mmol/L    Chloride 101 98 - 107 mmol/L    CO2 23 21 - 32 mmol/L    Anion gap 13 7 - 16 mmol/L    Glucose 121 (H) 65 - 100 mg/dL    BUN 30 (H) 8 - 23 MG/DL    Creatinine 1.02 (H) 0.6 - 1.0 MG/DL    GFR est AA >60 >60 ml/min/1.73m2    GFR est non-AA 55 (L) >60 ml/min/1.73m2    Calcium 9.8 8.3 - 10.4 MG/DL    Bilirubin, total 0.4 0.2 - 1.1 MG/DL    ALT (SGPT) 21 12 - 65 U/L    AST (SGOT) 22 15 - 37 U/L    Alk. phosphatase 105 50 - 136 U/L    Protein, total 7.4 6.3 - 8.2 g/dL    Albumin 3.3 3.2 - 4.6 g/dL    Globulin 4.1 (H) 2.3 - 3.5 g/dL    A-G Ratio 0.8 (L) 1.2 - 3.5     PROTHROMBIN TIME + INR    Collection Time: 01/25/18  7:13 PM   Result Value Ref Range    Prothrombin time 12.6 11.5 - 14.5 sec    INR 1.0       CXR Results  (Last 48 hours)               01/25/18 2011  XR CHEST SNGL V Final result    Impression:  Impression:  No significant interval change. Narrative:  AP chest radiograph       History: fall, right leg shortening, 86 years Female       Comparison: Chest radiograph February 27, 2017       Findings:   Normal cardiomediastinal silhouette. Mild diffuse interstitial   prominence unchanged, nonspecific. Mild subsegmental atelectasis bilateral lung   bases. No evidence of pneumothorax, pleural effusion, or air space   consolidation. Visualized soft tissue and osseous structures otherwise   unremarkable. CT Results  (Last 48 hours)               01/25/18 2007  CT HEAD WO CONT Final result    Impression:  IMPRESSION:  No acute intracranial abnormality. Narrative:  Examination: CT scan of the brain without contrast.       History: fall head injury, 80 years Female Pt [facility] via EMS [facility] for an unwitnessed fall and was found next to her bed       Technique: 5 mm axial imaging of the brain from the posterior fossa to the   vertex.   Radiation dose reduction techniques were used for this study:  Our CT   scanners use one or all of the following: Automated exposure control, adjustment   of the mA and/or kVp according to patient's size, iterative reconstruction. Comparison:  CT brain April 18, 2015       Findings: Persistent probable moderate microvascular disease with senescent   related atrophy. The ventricles, sulci are age-appropriate. No intracranial   hemorrhage or extra-axial collection is identified. No evidence of acute   infarct. No mass effect or midline shift is present. Basal cisterns are intact. The visualized paranasal sinuses and mastoid air cells are clear. The orbits,   bones, and soft tissues are normal in appearance. Image quality somewhat   degraded by motion artifact. 01/25/18 2007  CT SPINE CERV WO CONT Final result    Impression:  Impression:  No definite evidence of acute injury. Interval progression of mild   lower cervical degenerative disc disease as above. Narrative:  Exam:  CT cervical spine without contrast       History: fall, 86 years Female Pt arrives via EMS from [facility] for   an unwitnessed fall and was found next to her bed       Technique: Standard departmental protocol CT of the cervical spine was performed   without intravenous contrast administration. Coronal and sagittal reformats   were obtained. Radiation dose reduction techniques were used for this study:    Our CT scanners use one or all of the following: Automated exposure control,   adjustment of the mA and/or kVp according to patient's size, iterative   reconstruction. Comparison: CT cervical spine March 16, 2013       Findings: Persistent mild grade 1 anterolisthesis of the C7 vertebral body on   T1. Mild anterior loss of vertebral body height of C6 and C7 and significant   loss of disc space height C6-T1 with small anterior and posterior endplate   osteophytes appear progressed since prior. .  No evidence of acute fracture. No   evidence of significant spinal canal stenosis. Mild diffuse osteopenia. Visualized prevertebral and paravertebral soft tissues unremarkable.   Image   quality slightly degraded by motion artifact. Assessment and Plan: Active Hospital Problems    Diagnosis Date Noted    Closed right hip fracture ([facility] [address] 01/25/2018    DNR (do not resuscitate) 02/18/2017    Lewy body dementia 02/17/2017    CKD (chronic kidney disease) stage 3, GFR 30-59 ml/min 02/17/2017    Chronic diastolic congestive heart failure ([facility] [address] 02/17/2017    Essential hypertension, benign 08/30/2012    Chronic pain- chronic narcotic use for spinal stenosis 08/30/2012     Pt is cleared for surgery as the benefits outweigh the risks. She is at risk for post-op delirium due to having lewy body dementia. Watch fluid status closely with [facility]. · PLAN   · Ortho to manage hip fx  · Cont appropriate home meds (see MAR)  · Control symptoms (pain, n/v, fever, etc)  · Monitor appropriate labs   · DVT prophylaxis: SCDs  · Code status: DNR  · Risk: high with IV narcotics  · Anticipated DC needs: rehab if able  · Estimated LOS:  Greater than 2 midnights  · Plans discussed with patient and/or caregiver; questions answered.       Med records reviewed if applicable; findings:     Critical care time if applicable:      Signed By: Damon Means MD     January 25, 2018

## 2018-01-26 NOTE — PROGRESS NOTES
TRANSFER - IN REPORT:    Verbal report received from Lenny Muse, [address] [facility] on Janjulius Mood  being received from OR for routine progression of care      Report consisted of patients Situation, Background, Assessment and   Recommendations(SBAR). Information from the following report(s) SBAR, Kardex, OR Summary and Procedure Summary was reviewed with the receiving nurse. Opportunity for questions and clarification was provided. Assessment completed upon patients arrival to unit and care assumed.

## 2018-01-26 NOTE — PROGRESS NOTES
Problem: Falls - Risk of  Goal: *Absence of Falls  Document Cali Fall Risk and appropriate interventions in the flowsheet.    Outcome: Progressing Towards Goal  Fall Risk Interventions:  Mobility Interventions: Bed/chair exit alarm    Mentation Interventions: Bed/chair exit alarm, Door open when patient unattended    Medication Interventions: Bed/chair exit alarm    Elimination Interventions: Bed/chair exit alarm, Call light in reach    History of Falls Interventions: Bed/chair exit alarm, Door open when patient unattended

## 2018-01-26 NOTE — CONSULTS
PM&R Rehab Consult    Subjective:     Date of Consultation:  January 26, 2018    Referring Physician: Dr. Andrei Francis    Patient is a 80 y.o. female admitted s/p mechanical fall sustaining hip fx    Principal Problem:    Closed right hip fracture ([facility] [address] (1/25/2018)    Active Problems:    Essential hypertension, benign (8/30/2012)      Chronic pain- chronic narcotic use for spinal stenosis (8/30/2012)      Lewy body dementia (2/17/2017)      CKD (chronic kidney disease) stage 3, GFR 30-59 ml/min (2/17/2017)      Chronic diastolic congestive heart failure ([facility] [address] (2/17/2017)      DNR (do not resuscitate) (2/18/2017)    Patient is a 80 y.o. female who presents to the ER after having a fall at her nursing home. She has lewy body dementia and cannot provide history about what happened. Family is here and states that she has actually fallen a few times. She was recently moved into a memory care unit for closer observation. She has had pain in rt hip since fall, and unable to bear weight. ER workup shows a hip fx.   No known LOC  Past Medical History:   Diagnosis Date    Acute myocardial infarction of other anterior wall, initial episode of care [facility]) 8/30/2012    MI - Had a stent placed August 28,5233    Acute systolic congestive heart failure- in setting of anterior MI, EF 35-40% 3/73/8600    Acute systolic heart failure (HCC)     resolved at discharge, EF 35% at time of MI, 55% by echo prior to discharge    ARF (acute renal failure) ([facility] [address] 9/29/2012    Arthritis     CAD (coronary artery disease)     MI    Carotid artery stenosis without cerebral infarction     Chronic pain- chronic narcotic use for spinal stenosis 8/30/2012    Coronary atherosclerosis of native coronary artery 3/16/2013    Diarrhea 10/2/2012    Dyslipidemia 8/30/2012    Dyspnea     unspecified    Essential hypertension, benign 8/30/2012    Heart failure (HCC)     denies heart failure    Hyperlipidemia     other unsp dyslipidemia    Hypertension controlled, benign    Ischemic colitis ([facility] [address]     Lower GI bleed 3/12/2015    Myocardial infarction, anterolateral wall, subsequent care [facility])     s/p 2.5mm Xience stent to focal lesion in mid LAD 8/2012    N&V (nausea and vomiting) 3/16/2013    Sepsis ([facility] [address] 2/12/2014    Spinal stenosis 8/30/2012    ST elevation (STEMI) myocardial infarction involving left anterior descending coronary artery (HCC)     Syncope and collapse 2/12/2014      Family History   Problem Relation Age of Onset    Hypertension Mother     Stroke Mother     Hypertension Father       Social History   Substance Use Topics    Smoking status: Never Smoker    Smokeless tobacco: Never Used    Alcohol use No     Past Surgical History:   Procedure Laterality Date    CARDIAC SURG PROCEDURE UNLIST      stent    HX APPENDECTOMY      HX CHOLECYSTECTOMY      HX CORONARY STENT PLACEMENT      2012 for MI    HX GYN      hysterectomy    HX ORTHOPAEDIC      moreno knees, ankle and spinal cord stimulator    HX OTHER SURGICAL      cord stimulator for pain control removed      Prior to Admission medications    Medication Sig Start Date End Date Taking? Authorizing Provider   carvedilol (COREG) 3.125 mg tablet Take 1 Tab by mouth two (2) times daily (with meals). 3/1/17   Сергей Leon MD   lisinopril (PRINIVIL, ZESTRIL) 10 mg tablet Take 10 mg by mouth daily. Historical Provider   ondansetron hcl (ZOFRAN, AS HYDROCHLORIDE,) 4 mg tablet Take 4 mg by mouth every eight (8) hours as needed for Nausea. Historical Provider   acetaminophen (TYLENOL) 500 mg tablet Take 500 mg by mouth every six (6) hours as needed for Pain. Indications: Pain    Phys Berry, MD   cyanocobalamin 1,000 mcg tablet Take 1,000 mcg by mouth daily. Indications: PREVENTION OF VITAMIN B12 DEFICIENCY    Phys Other, MD   diclofenac (VOLTAREN) 1 % gel Apply 2 g to affected area four (4) times daily.  Indications: OSTEOARTHRITIS    Phys Other, MD   docusate sodium (COLACE) 100 mg capsule Take 100 mg by mouth three (3) times daily. Indications: Constipation    Alisson Smart MD   DULoxetine (CYMBALTA) 60 mg capsule Take 60 mg by mouth nightly. Alisson Smart MD   esomeprazole (NEXIUM) 40 mg capsule Take 40 mg by mouth daily. MD DELPHINE Sandersinfantis-DELPHINEani-DELPHINElong-DELPHINEbifi (PROBIOTIC 4X) 10-15 mg TbEC Take 1 Tab by mouth daily. Alisson Smart MD   rosuvastatin (CRESTOR) 5 mg tablet Take 5 mg by mouth every Monday, Wednesday, Friday. Alisson Smart MD   oxybutynin (DITROPAN) 5 mg tablet Take 5 mg by mouth two (2) times a day. Historical Provider   ferrous sulfate (IRON) 325 mg (65 mg iron) EC tablet Take 1 Tab by mouth two (2) times a day. 3/25/16   Luis Deluna MD   oxyCODONE IR (ROXICODONE) 5 mg immediate release tablet Take 1 Tab by mouth every four (4) hours as needed for Pain. Max Daily Amount: 30 mg. Patient taking differently: Take 5 mg by mouth every four (4) hours as needed for Pain. Takes 10mg in am, 5mg mid day, 10mg at HS, and 5mg if needed during the night. 3/7/16   Jacobo Mustafa MD   aspirin 81 mg chewable tablet Take 1 Tab by mouth daily. Resume Aspirin on March 3rd (  Will complete 7 days off   ASA ) 2/28/16   Gavino Schwab MD   rOPINIRole (REQUIP) 5 mg tablet Take 5 mg by mouth nightly. Historical Provider   nitroglycerin (NITROSTAT) 0.4 mg SL tablet 1 Tab by SubLINGual route every five (5) minutes as needed for Chest Pain. 9/4/12   Rip Miss, NP     Allergies   Allergen Reactions    Tramadol Other (comments)     Causes increased confusion and halluicnations    Morphine Nausea and Vomiting    Other Medication Other (comments)     \"Took steroids this week and made my face red. \"    Sulfa (Sulfonamide Antibiotics) Rash        Review of Systems:  Review of systems not obtained due to patient factors. Objective:     Vitals:  Blood pressure 137/79, pulse 80, temperature 98.7 °F (37.1 °C), resp.  rate 20, height 4' 8\" (1.422 m), weight 121 lb (54.9 kg), SpO2 98 %. Temp (24hrs), Av.5 °F (36.9 °C), Min:98.4 °F (36.9 °C), Max:98.7 °F (37.1 °C)    PE; not performed    Labs/Studies:  Recent Results (from the past 72 hour(s))   CBC WITH AUTOMATED DIFF    Collection Time: 18  7:13 PM   Result Value Ref Range    WBC 13.7 (H) 4.3 - 11.1 K/uL    RBC 3.91 (L) 4.05 - 5.25 M/uL    HGB 12.1 11.7 - 15.4 g/dL    HCT 36.1 35.8 - 46.3 %    MCV 92.3 79.6 - 97.8 FL    MCH 30.9 26.1 - 32.9 PG    MCHC 33.5 31.4 - 35.0 g/dL    RDW 13.0 11.9 - 14.6 %    PLATELET 532 935 - 538 K/uL    MPV 9.9 (L) 10.8 - 14.1 FL    DF AUTOMATED      NEUTROPHILS 85 (H) 43 - 78 %    LYMPHOCYTES 8 (L) 13 - 44 %    MONOCYTES 6 4.0 - 12.0 %    EOSINOPHILS 1 0.5 - 7.8 %    BASOPHILS 0 0.0 - 2.0 %    IMMATURE GRANULOCYTES 0 0.0 - 5.0 %    ABS. NEUTROPHILS 11.5 (H) 1.7 - 8.2 K/UL    ABS. LYMPHOCYTES 1.2 0.5 - 4.6 K/UL    ABS. MONOCYTES 0.9 0.1 - 1.3 K/UL    ABS. EOSINOPHILS 0.1 0.0 - 0.8 K/UL    ABS. BASOPHILS 0.0 0.0 - 0.2 K/UL    ABS. IMM. GRANS. 0.1 0.0 - 0.5 K/UL   METABOLIC PANEL, COMPREHENSIVE    Collection Time: 18  7:13 PM   Result Value Ref Range    Sodium 137 136 - 145 mmol/L    Potassium 4.0 3.5 - 5.1 mmol/L    Chloride 101 98 - 107 mmol/L    CO2 23 21 - 32 mmol/L    Anion gap 13 7 - 16 mmol/L    Glucose 121 (H) 65 - 100 mg/dL    BUN 30 (H) 8 - 23 MG/DL    Creatinine 1.02 (H) 0.6 - 1.0 MG/DL    GFR est AA >60 >60 ml/min/1.73m2    GFR est non-AA 55 (L) >60 ml/min/1.73m2    Calcium 9.8 8.3 - 10.4 MG/DL    Bilirubin, total 0.4 0.2 - 1.1 MG/DL    ALT (SGPT) 21 12 - 65 U/L    AST (SGOT) 22 15 - 37 U/L    Alk.  phosphatase 105 50 - 136 U/L    Protein, total 7.4 6.3 - 8.2 g/dL    Albumin 3.3 3.2 - 4.6 g/dL    Globulin 4.1 (H) 2.3 - 3.5 g/dL    A-G Ratio 0.8 (L) 1.2 - 3.5     TYPE & SCREEN    Collection Time: 18  7:13 PM   Result Value Ref Range    Crossmatch Expiration 2018     ABO/Rh(D) O POSITIVE     Antibody screen NEG    PROTHROMBIN TIME + INR    Collection Time: 01/25/18  7:13 PM   Result Value Ref Range    Prothrombin time 12.6 11.5 - 14.5 sec    INR 1.0     URINALYSIS W/ RFLX MICROSCOPIC    Collection Time: 01/25/18 10:15 PM   Result Value Ref Range    Color YELLOW      Appearance CLEAR      Specific gravity 1.015 1.001 - 1.023      pH (UA) 8.0 5.0 - 9.0      Protein 30 (A) NEG mg/dL    Glucose NEGATIVE  NEG mg/dL    Ketone TRACE (A) NEG mg/dL    Bilirubin NEGATIVE  NEG      Blood NEGATIVE  NEG      Urobilinogen 0.2 0.2 - 1.0 EU/dL    Nitrites NEGATIVE  NEG      Leukocyte Esterase NEGATIVE  NEG      Bacteria 0 0 /hpf   MSSA/MRSA SC BY PCR, NASAL SWAB    Collection Time: 01/26/18 12:35 AM   Result Value Ref Range    Special Requests: NO SPECIAL REQUESTS      Culture result:        SA target not detected. A MRSA NEGATIVE, SA NEGATIVE test result does not preclude MRSA or SA nasal colonization.          Functional Assessment:  Functional Assessment  Decline in Gait/Transfer/Balance: Yes (comment)  Decline in Capacity to Feed/Dress/Bathe: Yes (comment)  Developmental Delay: No  Chewing/Swallowing Problems: No  Difficulty with Secretions: No  Speech Slurred/Thick/Garbled: No         Ambulation:  Activity and Safety  Activity Level: Bed Rest  Ambulate: No (Comment) (right hip fx)  Activity: In bed, Resting quietly, Sleeping  Activity Assistance: Other (comment)  Weight Bearing Status: NWB (Non Weight Bearing) (right )  NWB (Non Weight Bearing extremities: RLE (Right Lower Extremity)  Repositioned: Head of bed elevated (degrees)  Patient Turned: Turns self  Head of Bed Elevated: HOB 30  Assistive Device: Fall prevention device  Safety Measures: Bed/Chair alarm on, Bed/Chair-Wheels locked, Bed in low position, Call light within reach, Fall prevention (comment), Side rails X2     Impression/Plan:     Principal Problem:    Closed right hip fracture ([address] (1/25/2018)    Active Problems:    Essential hypertension, benign (8/30/2012)      Chronic pain- chronic narcotic use for spinal stenosis (8/30/2012)      Lewy body dementia (2/17/2017)      CKD (chronic kidney disease) stage 3, GFR 30-59 ml/min (2/17/2017)      Chronic diastolic congestive heart failure ([facility] [address] (2/17/2017)      DNR (do not resuscitate) (2/18/2017)     s/p right proximal femoral IT fx; pending surgical intervention    Recommendations: Continue Acute Rehab Program  Coordination of rehab/medical care  Counseling of PM & R care issues management  Monitoring and management of medical conditions per plan of care/orders   -Pt is a NH resident with lewy body dementia with chronic debility. Not approp for IRC due to residing at a NH, dementia, performance status. There is no medical necessity for [facility]. Will need to return to NH.  Poor rehab potential.    Reviewed Therapies/Labs/Meds/Records    Signed By:  Tyler Vidal MD     January 26, 2018

## 2018-01-26 NOTE — CONSULTS
Geriatric Fracture Consult  Patient: Angelic Alfaro  YOB: 1932   MRN: [id number]      Consult Date: 1/26/2018     Consulting Physician: DR Airam Clarke    Chief Complaint: RIGHT INTERTROCHANTERIC HIP FRACTURE; OSTEOPOROSIS  History of Present Illness:   Review of Systems: A comprehensive review of systems was negative except for that written in the History of Present Illness. ED Presentation Time: < 8 hours  Mechanism of Injury: Fall from standing  Ambulatory Status: Santos Pennington  Past Medical History:   Past Medical History:   Diagnosis Date    Acute myocardial infarction of other anterior wall, initial episode of care ([facility] [address] 8/30/2012    MI - Had a stent placed August 92,3052    Acute systolic congestive heart failure- in setting of anterior MI, EF 35-40% 3/52/4299    Acute systolic heart failure (HCC)     resolved at discharge, EF 35% at time of MI, 55% by echo prior to discharge    ARF (acute renal failure) ([facility] [address] 9/29/2012    Arthritis     CAD (coronary artery disease)     MI    Carotid artery stenosis without cerebral infarction     Chronic pain- chronic narcotic use for spinal stenosis 8/30/2012    Coronary atherosclerosis of native coronary artery 3/16/2013    Diarrhea 10/2/2012    Dyslipidemia 8/30/2012    Dyspnea     unspecified    Essential hypertension, benign 8/30/2012    Heart failure (HCC)     denies heart failure    Hyperlipidemia     other unsp dyslipidemia    Hypertension     controlled, benign    Ischemic colitis ([address]     Lower GI bleed 3/12/2015    Myocardial infarction, anterolateral wall, subsequent care ([facility] [address]     s/p 2.5mm Xience stent to focal lesion in mid LAD 8/2012    N&V (nausea and vomiting) 3/16/2013    Sepsis ([address] 2/12/2014    Spinal stenosis 8/30/2012    ST elevation (STEMI) myocardial infarction involving left anterior descending coronary artery (HCC)     Syncope and collapse 2/12/2014       Allergies:    Allergies   Allergen Reactions    Tramadol Other (comments)     Causes increased confusion and halluicnations    Morphine Nausea and Vomiting    Other Medication Other (comments)     \"Took steroids this week and made my face red. \"    Sulfa (Sulfonamide Antibiotics) Rash      Past Surgical History:   Past Surgical History:   Procedure Laterality Date    CARDIAC SURG PROCEDURE UNLIST      stent    HX APPENDECTOMY      HX CHOLECYSTECTOMY      HX CORONARY STENT PLACEMENT      2012 for MI    HX GYN      hysterectomy    HX ORTHOPAEDIC      moreno knees, ankle and spinal cord stimulator    HX OTHER SURGICAL      cord stimulator for pain control removed      Social History:   Social History     Social History    Marital status:      Spouse name: N/A    Number of children: N/A    Years of education: N/A     Occupational History    Not on file. Social History Main Topics    Smoking status: Never Smoker    Smokeless tobacco: Never Used    Alcohol use No    Drug use: No    Sexual activity: Not on file     Other Topics Concern    Not on file     Social History Narrative    Lives at home independently    Has 2 children        Has living will, DNR    Clementina Najera DO              FAMILY HISTORY - REVIEWED - NO PERTINENT FAMILY HISTORY  Dwelling Status: [facility]   Current Anticoagulant Medications: Aspirin 81 MG/DAY  History of falls: YES  Prior Fractures: DENIES  Osteoporosis Medications: none  Bone Density Tests: UNKNOWN  X-RAYS: Right Intertrochanteric Fracture  Physical Exam:   PATIENT COMPLAINING OF RIGHT HIP PAIN AFTER A FALL AT HOME. FOCUSED MUSCULOSKELETAL EXAM REVEALS DECREASED ROM TO RIGHT LE. THERE IS SHORTENING AND EXTERNAL ROTATION NOTED TO RIGHT LE. PATIENT IS TENDER TO PALPATION OVER RIGHT HIP AND GROIN. PATIENT HAS PAIN WITH LOG ROLLING. N/V INTACT. DENIES OTHER INJURIES.     Assessment / Plan: ORIF RIGHT IT FRACTURE WITH IM NAIL; CONSULT PT/OT - WBAT RIGHT LE; STR  RISKS AND BENEFITS WERE ADDRESSED WITH PATIENT AND FAMILY  Labs:    Lab Results   Component Value Date/Time    HGB 10.4 01/26/2018 06:26 AM    WBC 9.3 01/26/2018 06:26 AM    INR 1.0 01/25/2018 07:13 PM    Albumin 3.3 01/25/2018 07:13 PM      Preoperative Clearance: YES by Hospitalist          Signed by: Tracy Armendariz NP   Today's Date: January 26, 2018

## 2018-01-26 NOTE — ANESTHESIA POSTPROCEDURE EVALUATION
Post-Anesthesia Evaluation and Assessment    Patient: Estel Severance MRN: [id number]  SSN: [id number]    YOB: 1932  Age: 80 y.o. Sex: female       Cardiovascular Function/Vital Signs  Visit Vitals    /73    Pulse 68    Temp 36.6 °C (97.9 °F)    Resp 16    Ht 4' 8\" (1.422 m)    Wt 54.9 kg (121 lb)    SpO2 98%    BMI 27.13 kg/m2       Patient is status post general anesthesia for Procedure(s): FEMUR INSERTION INTRA MEDULLARY NAIL. Nausea/Vomiting: None    Postoperative hydration reviewed and adequate. Pain:  Pain Scale 1: Numeric (0 - 10) (01/26/18 0719)  Pain Intensity 1: 0 (01/26/18 0719)   Managed    Neurological Status:   Neuro  Neurologic State: Confused (01/25/18 2350)  Orientation Level: Disoriented to place; Disoriented to situation;Disoriented to time;Oriented to person (01/25/18 2350)  Cognition: Impaired decision making;Decreased command following (01/25/18 2350)  LLE Motor Response: Weak (01/25/18 2350)  RLE Motor Response: Weak (01/25/18 2350)   At baseline    Mental Status and Level of Consciousness: Arousable    Pulmonary Status:   O2 Device: Nasal cannula (01/26/18 1048)   Adequate oxygenation and airway patent    Complications related to anesthesia: None    Post-anesthesia assessment completed. No concerns. Awake and able to follow commands. Decision to transfer back to floor bed after recovery in OR due to PACU delays.      Signed By: Mike Gonzales MD     January 26, 2018

## 2018-01-26 NOTE — BRIEF OP NOTE
BRIEF OPERATIVE NOTE    Date of Procedure: 1/26/2018   Preoperative Diagnosis: right hip fracture  Postoperative Diagnosis: Right intertrochanteric femur fracture    Procedure(s):  RIGHT FEMUR INSERTION INTRA MEDULLARY NAIL  Surgeon(s) and Role:     * Jameson Mendosa MD - Primary         Assistant Staff: None      Surgical Staff:  Circ-1: Laurence Aguirre RN  Scrub Tech-1: Arlette Bingham  Scrub Tech-2: Myranda Guzmán  Scrub Tech-3: Jared Nascimento  Event Time In   Incision Start 1433   Incision Close 8574     Anesthesia: Spinal   Estimated Blood Loss: 100 CC  Specimens: * No specimens in log *   Findings: NONE   Complications: NONE  Implants:   Implant Name Type Inv.  Item Serial No.  Lot No. LRB No. Used Action   NAIL PATRICIA 130D [id number] RT -- TFNA STRL - [id number]  NAIL PATRICIA 130D [id number] RT -- [facility] [id number] Right 1 Implanted   BLADE HELCL ADV TFNA 100MM -- STRL - [id number]   BLADE HELCL ADV TFNA 100MM -- [id number] [id number] Right 1 Implanted

## 2018-01-26 NOTE — PROGRESS NOTES
TRANSFER - OUT REPORT:    Verbal report given to Pamela Morelos on Jason Hendrickson  being transferred to Pre-op for ordered procedure       Report consisted of patients Situation, Background, Assessment and   Recommendations(SBAR). Information from the following report(s) SBAR, Kardex, ED Summary, Procedure Summary, MAR and Recent Results was reviewed with the receiving nurse. Lines:   Peripheral IV 02/28/17 Left Hand (Active)       Peripheral IV 02/28/17 Left Arm (Active)       Peripheral IV 01/25/18 Left Hand (Active)   Site Assessment Clean, dry, & intact 1/25/2018 11:50 PM   Phlebitis Assessment 0 1/25/2018 11:50 PM   Infiltration Assessment 0 1/25/2018 11:50 PM   Dressing Status Clean, dry, & intact 1/25/2018 11:50 PM   Dressing Type Transparent 1/25/2018 11:50 PM   Hub Color/Line Status Flushed;Patent; Infusing 1/25/2018 11:50 PM        Opportunity for questions and clarification was provided.     Sending Ancef to Pre-op with patient

## 2018-01-26 NOTE — PERIOP NOTES
TRANSFER - IN REPORT:    [facility] on Estel Severance  being received from 7th floor for ordered procedure      Report consisted of patients Situation, Background, Assessment and   Recommendations(SBAR). Information from the following report(s) Kardex, MAR and Recent Results was reviewed with the receiving nurse. Opportunity for questions and clarification was provided. Assessment completed upon patients arrival to unit and care assumed.

## 2018-01-26 NOTE — PROGRESS NOTES
Pt received on the unit to 720 from ED accompanied by daughter. Skin assessment completed by this nurse and Pete Abdalla RN. Reddened, blanchable sacrum noted and Allevyn applied for protection. Nurse noted both heels reddened, blanchable, and slightly boggy. Right hip red and warm due to fall.

## 2018-01-26 NOTE — PROGRESS NOTES
Problem: Mobility Impaired (Adult and Pediatric)  Goal: *Acute Goals and Plan of Care (Insert Text)  STG:  (1.)Ms. Ramos Franco will move from supine to sit and sit to supine , scoot up and down and roll side to side with MAXIMAL ASSIST within 3 day(s). (2.)Ms. Ramos Franco will transfer from bed to chair and chair to bed with MAXIMAL ASSIST using the least restrictive device within 3 day(s). (3.)Ms. Ramos Franco will participate in therapeutic activity/exerices x 12 minutes for increased strength within 3 days. (4.)Ms. Ramos Franco will propel wheelchair 15 ft with STAND BY ASSIST for increased functional independence within 3 days. LTG:  (1.)Ms. Ramos Franco will move from supine to sit and sit to supine , scoot up and down and roll side to side in bed with MODERATE ASSIST within 7 day(s). (2.)Ms. Ramos Franco will transfer from bed to chair and chair to bed with MODERATE ASSIST using the least restrictive device within 7 day(s). (3.)Ms. Ramos Franco will participate in therapeutic activity/exerices x 23 minutes for increased strength within 7 days. (4.)Ms. Ramos Franco will propel wheelchair 50 ft with STAND BY ASSIST for increased functional independence within 7 days.   ________________________________________________________________________________________________      PHYSICAL THERAPY: Initial Assessment, PM 1/26/2018  INPATIENT: Hospital Day: 2  [place] / [place] / Product Type: Medicare /    R SUNDAY WBAT     NAME/AGE/GENDER: Azucena Welsh is a 80 y.o. female   PRIMARY DIAGNOSIS: Closed right hip fracture (HCC)  right hip fracture Closed right hip fracture (HCC) Closed right hip fracture (HCC)  Procedure(s) (LRB):  FEMUR INSERTION INTRA MEDULLARY NAIL (Right)  Day of Surgery  ICD-10: Treatment Diagnosis:   · Generalized Muscle Weakness (M62.81)  · Difficulty in walking, Not elsewhere classified (R26.2)  · Other abnormalities of gait and mobility (R26.89)  · History of falling (Z91.81) Precaution/Allergies:  Tramadol; Morphine; Other medication; and Sulfa (sulfonamide antibiotics)      ASSESSMENT:     Ms. Nicky Chin is an 80 y.o. female in the hospital for the above who was laying in bed with increased left trunk flexion and pallor observed. Her daughter-in-law was present and reports that pt has a history of scoliosis. Family also provided all of history given pt was very lethargic and said very little throughout evaluation. Per family pt is wheelchair bound due to high incidence of falls and resides in a memory care facility. Pt is assisted for all ADLs and with bed mobility/transfers as well though no mention of francisco lift. Ms. Nicky Chin presents to PT with grossly decreased strength and AROM in B LEs. Pt observed to have some L facial drooping but family reported this is not new. She required max-total assist for bed mobility and total assist for transfer to chair. Pt will likely need to be transferred via sheet in future given debility and cognitive status. She also was observed to lag R LE behind during pivoting making it unsafe to continue to transfer that way. Pt could benefit from skilled PT given her level of debility. This section established at most recent assessment   PROBLEM LIST (Impairments causing functional limitations):  1. Decreased Strength  2. Decreased ADL/Functional Activities  3. Decreased Transfer Abilities  4. Decreased Ambulation Ability/Technique  5. Decreased Balance  6. Increased Pain  7. Decreased Activity Tolerance  8. Decreased Cognition   INTERVENTIONS PLANNED: (Benefits and precautions of physical therapy have been discussed with the patient.)  1. Balance Exercise  2. Bed Mobility  3. Family Education  4. Gait Training  5. Neuromuscular Re-education/Strengthening  6. Therapeutic Activites  7. Therapeutic Exercise/Strengthening  8. Transfer Training  9.  Group Therapy     TREATMENT PLAN: Frequency/Duration: Twice times a week for duration of hospital stay  Rehabilitation Potential For Stated Goals: Guarded     RECOMMENDED REHABILITATION/EQUIPMENT: (at time of discharge pending progress): Due to the probability of continued deficits (see above) this patient will likely need continued skilled physical therapy after discharge. Equipment:    None at this time              HISTORY:   History of Present Injury/Illness (Reason for Referral):  Per H&P: \"  HPI:   Patient history was obtained from the ER provider prior to seeing the patient.     Patient is a 80 y.o. female who presents to the ER after having a fall at her nursing home. She has lewy body dementia and cannot provide history about what happened. Family is here and states that she has actually fallen a few times. She was recently moved into a memory care unit for closer observation. She has had pain in rt hip since fall, and unable to bear weight. ER workup shows a hip fx. No known LOC.        ROS:  All systems have been reviewed and are negative except as stated in HPI or elsewhere. \"  Past Medical History/Comorbidities:   Ms. Patrice Man  has a past medical history of Acute myocardial infarction of other anterior wall, initial episode of care [facility]) (8/30/2012); Acute systolic congestive heart failure- in setting of anterior MI, EF 35-40% (8/30/2012); Acute systolic heart failure ([address] ARF (acute renal failure) ([address] (9/29/2012); Arthritis; CAD (coronary artery disease); Carotid artery stenosis without cerebral infarction; Chronic pain- chronic narcotic use for spinal stenosis (8/30/2012); Coronary atherosclerosis of native coronary artery (3/16/2013); Diarrhea (10/2/2012); Dyslipidemia (8/30/2012); Dyspnea; Essential hypertension, benign (8/30/2012); Heart failure ([address] Hyperlipidemia; Hypertension; Ischemic colitis ([address] Lower GI bleed (3/12/2015); Myocardial infarction, anterolateral wall, subsequent care ([address] N&V (nausea and vomiting) (3/16/2013); Sepsis ([address] (2/12/2014);  Spinal stenosis (8/30/2012); ST elevation (STEMI) myocardial infarction involving left anterior descending coronary artery [address] and Syncope and collapse (2/12/2014). She also has no past medical history of Unspecified sleep apnea. Ms. Emeka Bowden  has a past surgical history that includes hx cholecystectomy; hx gyn; hx appendectomy; hx orthopaedic; hx other surgical; hx coronary stent placement; and pr cardiac surg procedure unlist.  Social History/Living Environment:   Home Environment: [address] [place] [address] Name: Jhon   One/Two Story Residence: One story  Living Alone: No  Support Systems: Family member(s), Skilled nursing facility  Patient Expects to be Discharged to[de-identified] Skilled nursing facility  Current DME Used/Available at Home: Wheelchair  Prior Level of Function/Work/Activity:  Lives in memory care facility. Gets assistance for ADLs and is wheelchair bound. Recent falls reported. Number of Personal Factors/Comorbidities that affect the Plan of Care:  Dementia  Falls 3+: HIGH COMPLEXITY   EXAMINATION:   Most Recent Physical Functioning:   Gross Assessment:  AROM: Grossly decreased, non-functional  Strength: Grossly decreased, non-functional               Posture:  Posture (WDL): Exceptions to WDL  Posture Assessment: Scoliosis right, Increased  Balance:  Sitting: Impaired  Sitting - Static: Poor (constant support)  Sitting - Dynamic: Poor (constant support)  Standing: Impaired  Standing - Static: Poor  Standing - Dynamic : Poor Bed Mobility:  Supine to Sit: Total assistance  Scooting: Total assistance  Wheelchair Mobility:     Transfers:  Sit to Stand: Maximum assistance; Total assistance  Stand to Sit: Total assistance;Assist x2  Bed to Chair: Total assistance  Gait:            Body Structures Involved:  1. Bones  2. Joints  3. Muscles Body Functions Affected:  1. Sensory/Pain  2. Neuromusculoskeletal  3. Movement Related Activities and Participation Affected:  1.  Learning and Applying Knowledge  2. General Tasks and Demands  3. Communication  4. Mobility  5. Self Care  6. Domestic Life  7. Interpersonal Interactions and Relationships  8. Community, Social and [place]   Number of elements that affect the Plan of Care: 4+: HIGH COMPLEXITY   CLINICAL PRESENTATION:   Presentation: Evolving clinical presentation with changing clinical characteristics: MODERATE COMPLEXITY   CLINICAL DECISION MAKIN [facility] Mobility Inpatient Short Form  How much difficulty does the patient currently have. .. Unable A Lot A Little None   1. Turning over in bed (including adjusting bedclothes, sheets and blankets)? [] 1   [x] 2   [] 3   [] 4   2. Sitting down on and standing up from a chair with arms ( e.g., wheelchair, bedside commode, etc.)   [x] 1   [] 2   [] 3   [] 4   3. Moving from lying on back to sitting on the side of the bed? [x] 1   [] 2   [] 3   [] 4   How much help from another person does the patient currently need. .. Total A Lot A Little None   4. Moving to and from a bed to a chair (including a wheelchair)? [x] 1   [] 2   [] 3   [] 4   5. Need to walk in hospital room? [x] 1   [] 2   [] 3   [] 4   6. Climbing 3-5 steps with a railing? [x] 1   [] 2   [] 3   [] 4   © , Trustees of [address] [place] [address], under license to [employer]. All rights reserved      Score:  Initial: 7 Most Recent: X (Date: -- )    Interpretation of Tool:  Represents activities that are increasingly more difficult (i.e. Bed mobility, Transfers, Gait). Score 24 23 22-20 19-15 14-10 9-7 6     Modifier CH CI CJ CK CL CM CN      ?  Mobility - Walking and Moving Around:     - CURRENT STATUS: CM - 80%-99% impaired, limited or restricted    - GOAL STATUS: CL - 60%-79% impaired, limited or restricted    - D/C STATUS:  ---------------To be determined---------------  [place] / [place] / Product Type: Medicare /      Medical Necessity: · Patient demonstrates GUARDED rehab potential due to higher previous functional level. Reason for Services/Other Comments:  · Patient continues to require skilled intervention due to decreased functional mobility. Use of outcome tool(s) and clinical judgement create a POC that gives a: Questionable prediction of patient's progress: MODERATE COMPLEXITY            TREATMENT:   (In addition to Assessment/Re-Assessment sessions the following treatments were rendered)   Pre-treatment Symptoms/Complaints:  Pain observed with movement; lethargy  Pain: Initial:   Pain Intensity 1: 2  Post Session:  2     Assessment/Reassessment only, no treatment provided today    Braces/Orthotics/Lines/Etc:   · IV  · watts catheter  · O2 Device: Nasal cannula  Treatment/Session Assessment:    · Response to Treatment:  Pt tolerated poorly given lethargy and decreased command following. · Interdisciplinary Collaboration:   o Physical Therapist  o Registered Nurse  o Rehabilitation Attendant  o Certified Nursing Assistant/Patient Care Technician  · After treatment position/precautions:   o Up in chair  o Bed alarm/tab alert on  o Bed/Chair-wheels locked  o Call light within reach  o RN notified  o Family at bedside   · Compliance with Program/Exercises: Will assess as treatment progresses. · Recommendations/Intent for next treatment session: \"Next visit will focus on advancements to more challenging activities and reduction in assistance provided\".   Total Treatment Duration:  PT Patient Time In/Time Out  Time In: 1409  Time Out: [address], PT, DPT

## 2018-01-26 NOTE — ED NOTES
TRANSFER - OUT REPORT:    Verbal report given to Yulia RN(name) on Asia Moise  being transferred to [facility](unit) for routine progression of care       Report consisted of patients Situation, Background, Assessment and   Recommendations(SBAR). Information from the following report(s) SBAR, ED Summary, [facility] and Recent Results was reviewed with the receiving nurse. Lines:   Peripheral IV 02/28/17 Left Hand (Active)       Peripheral IV 02/28/17 Left Arm (Active)       Peripheral IV 01/25/18 Left Hand (Active)        Opportunity for questions and clarification was provided.

## 2018-01-26 NOTE — PROGRESS NOTES
Problem: Nutrition Deficit  Goal: *Optimize nutritional status  Nutrition  Reason for assessment: Referral received from nursing admission Malnutrition Screening Tool for recently lost 14-23# without trying and eating poorly due to decreased appetite. Assessment:   Diet order(s): Regular  Food/Nutrition Patient History:  Pt lethargic. Seen in company of daughter and . Daughter reports pt weighed 142# when she went to live in a NH about 7-8 months ago and has lost down to 121# the last she knew but she has been stable at that weight for the last month. Pt has a preference for sweets and is supposed to receive a nutritional supplement of either Ensure pudding or something else once a day. Tom Loaiza has seen the supplements on meals but her daughter has not. Daughter says pt will eat whatever you give her, and that the main issue is that she can't feed herself and she suspects that sometimes she is not fed. Daughter also thinks that pt also sometimes will try to not eat too much because she thinks she doesn't need to eat much to help watch her weight. Pt has not been alert enough to eat anything today and was NPO this AM for surgery for hip fracture. Pamela Perez Anthropometrics:Height: 4' 8\" (142.2 cm),Note maximum height listed in chart is 5'0\"  Weight: 54.9 kg (121 lb)-unknown source, BMI 23.7 (for height of 5'0\") c/w acceptable weight for age >71. Weight hx per EMR ( Based on [employer] care functionality, RD cannot know if these weight are actual versus stated):   WT / BMI WEIGHT   2/28/2017 143 lb   2/17/2017 137 lb    15% change in wt within ~7-8 months c/w severe change. Macronutrient needs:  EER:  8209-4073 kcal /day (25-30 kcal/kg listed BW)  EPR:  45-55 grams protein/day (1-1.2 grams/kg IBW)  Intake/Comparative Standards: Defer until more intake data is available.     Nutrition Diagnosis: Unintentional weight loss r/t intake < needs and self feeding deficit as evidenced by weight loss and inability to feed self as noted above    Intervention:  Order placed for feeding assistance  Meals and snacks: Continue current diet. Offer mechanical soft consistency as needed  Nutrition Supplement Therapy: Ensure Enlive tid. Discharge nutrition plan: Continue nutritional supplement at [facility].     Vanessa Portillo, [address]

## 2018-01-27 LAB
25(OH)D3+25(OH)D2 SERPL-MCNC: 47.5 NG/ML (ref 30–100)
ANION GAP SERPL CALC-SCNC: 9 MMOL/L (ref 7–16)
BASOPHILS # BLD: 0 K/UL (ref 0–0.2)
BASOPHILS NFR BLD: 0 % (ref 0–2)
BUN SERPL-MCNC: 40 MG/DL (ref 8–23)
CALCIUM SERPL-MCNC: 9.3 MG/DL (ref 8.3–10.4)
CHLORIDE SERPL-SCNC: 102 MMOL/L (ref 98–107)
CO2 SERPL-SCNC: 27 MMOL/L (ref 21–32)
CREAT SERPL-MCNC: 1.18 MG/DL (ref 0.6–1)
DIFFERENTIAL METHOD BLD: ABNORMAL
EOSINOPHIL # BLD: 0 K/UL (ref 0–0.8)
EOSINOPHIL NFR BLD: 0 % (ref 0.5–7.8)
ERYTHROCYTE [DISTWIDTH] IN BLOOD BY AUTOMATED COUNT: 13.3 % (ref 11.9–14.6)
GLUCOSE SERPL-MCNC: 119 MG/DL (ref 65–100)
HCT VFR BLD AUTO: 23.5 % (ref 35.8–46.3)
HGB BLD-MCNC: 7.7 G/DL (ref 11.7–15.4)
IMM GRANULOCYTES # BLD: 0 K/UL (ref 0–0.5)
IMM GRANULOCYTES NFR BLD AUTO: 0 % (ref 0–5)
LYMPHOCYTES # BLD: 1.4 K/UL (ref 0.5–4.6)
LYMPHOCYTES NFR BLD: 16 % (ref 13–44)
MCH RBC QN AUTO: 30.4 PG (ref 26.1–32.9)
MCHC RBC AUTO-ENTMCNC: 32.8 G/DL (ref 31.4–35)
MCV RBC AUTO: 92.9 FL (ref 79.6–97.8)
MM INDURATION POC: 0 MM (ref 0–5)
MONOCYTES # BLD: 1 K/UL (ref 0.1–1.3)
MONOCYTES NFR BLD: 11 % (ref 4–12)
NEUTS SEG # BLD: 6.7 K/UL (ref 1.7–8.2)
NEUTS SEG NFR BLD: 73 % (ref 43–78)
PLATELET # BLD AUTO: 256 K/UL (ref 150–450)
PMV BLD AUTO: 10 FL (ref 10.8–14.1)
POTASSIUM SERPL-SCNC: 4.5 MMOL/L (ref 3.5–5.1)
PPD POC: NEGATIVE NEGATIVE
RBC # BLD AUTO: 2.53 M/UL (ref 4.05–5.25)
SODIUM SERPL-SCNC: 138 MMOL/L (ref 136–145)
WBC # BLD AUTO: 9.2 K/UL (ref 4.3–11.1)

## 2018-01-27 PROCEDURE — 74011250637 HC RX REV CODE- 250/637: Performed by: NURSE PRACTITIONER

## 2018-01-27 PROCEDURE — 74011250637 HC RX REV CODE- 250/637: Performed by: FAMILY MEDICINE

## 2018-01-27 PROCEDURE — 97530 THERAPEUTIC ACTIVITIES: CPT

## 2018-01-27 PROCEDURE — 94760 N-INVAS EAR/PLS OXIMETRY 1: CPT

## 2018-01-27 PROCEDURE — 74011000250 HC RX REV CODE- 250: Performed by: FAMILY MEDICINE

## 2018-01-27 PROCEDURE — 97535 SELF CARE MNGMENT TRAINING: CPT

## 2018-01-27 PROCEDURE — 74011250636 HC RX REV CODE- 250/636: Performed by: NURSE PRACTITIONER

## 2018-01-27 PROCEDURE — 80048 BASIC METABOLIC PNL TOTAL CA: CPT | Performed by: NURSE PRACTITIONER

## 2018-01-27 PROCEDURE — 77010033678 HC OXYGEN DAILY

## 2018-01-27 PROCEDURE — 85025 COMPLETE CBC W/AUTO DIFF WBC: CPT | Performed by: NURSE PRACTITIONER

## 2018-01-27 PROCEDURE — 77030013131 HC IV BLD ST ICUM -A

## 2018-01-27 PROCEDURE — 97166 OT EVAL MOD COMPLEX 45 MIN: CPT

## 2018-01-27 PROCEDURE — 36430 TRANSFUSION BLD/BLD COMPNT: CPT

## 2018-01-27 PROCEDURE — 65270000029 HC RM PRIVATE

## 2018-01-27 PROCEDURE — P9016 RBC LEUKOCYTES REDUCED: HCPCS | Performed by: EMERGENCY MEDICINE

## 2018-01-27 PROCEDURE — 36415 COLL VENOUS BLD VENIPUNCTURE: CPT | Performed by: NURSE PRACTITIONER

## 2018-01-27 PROCEDURE — 74011250636 HC RX REV CODE- 250/636: Performed by: FAMILY MEDICINE

## 2018-01-27 RX ORDER — SODIUM CHLORIDE 9 MG/ML
250 INJECTION, SOLUTION INTRAVENOUS AS NEEDED
Status: DISCONTINUED | OUTPATIENT
Start: 2018-01-27 | End: 2018-01-29 | Stop reason: SDUPTHER

## 2018-01-27 RX ADMIN — PANTOPRAZOLE SODIUM 40 MG: 40 TABLET, DELAYED RELEASE ORAL at 05:33

## 2018-01-27 RX ADMIN — OXYBUTYNIN CHLORIDE 5 MG: 5 TABLET ORAL at 20:43

## 2018-01-27 RX ADMIN — OXYBUTYNIN CHLORIDE 5 MG: 5 TABLET ORAL at 09:08

## 2018-01-27 RX ADMIN — DOCUSATE SODIUM 100 MG: 100 CAPSULE, LIQUID FILLED ORAL at 17:24

## 2018-01-27 RX ADMIN — Medication 5 ML: at 14:42

## 2018-01-27 RX ADMIN — ROPINIROLE HYDROCHLORIDE 5 MG: 2 TABLET, FILM COATED ORAL at 20:43

## 2018-01-27 RX ADMIN — LISINOPRIL 10 MG: 5 TABLET ORAL at 09:08

## 2018-01-27 RX ADMIN — CARVEDILOL 3.12 MG: 3.12 TABLET, FILM COATED ORAL at 17:00

## 2018-01-27 RX ADMIN — Medication 10 ML: at 20:44

## 2018-01-27 RX ADMIN — DULOXETINE HYDROCHLORIDE 60 MG: 60 CAPSULE, DELAYED RELEASE ORAL at 20:43

## 2018-01-27 RX ADMIN — OXYCODONE HYDROCHLORIDE 5 MG: 5 TABLET ORAL at 14:42

## 2018-01-27 RX ADMIN — SODIUM CHLORIDE, SODIUM LACTATE, POTASSIUM CHLORIDE, AND CALCIUM CHLORIDE 75 ML/HR: 600; 310; 30; 20 INJECTION, SOLUTION INTRAVENOUS at 04:43

## 2018-01-27 RX ADMIN — CALCIUM CARBONATE 500 MG (1,250 MG)-VITAMIN D3 200 UNIT TABLET 1 TABLET: at 11:01

## 2018-01-27 RX ADMIN — CARVEDILOL 3.12 MG: 3.12 TABLET, FILM COATED ORAL at 09:08

## 2018-01-27 RX ADMIN — OXYCODONE HYDROCHLORIDE 5 MG: 5 TABLET ORAL at 20:43

## 2018-01-27 RX ADMIN — ACETAMINOPHEN 650 MG: 325 TABLET, FILM COATED ORAL at 14:41

## 2018-01-27 RX ADMIN — OXYCODONE HYDROCHLORIDE 5 MG: 5 TABLET ORAL at 05:37

## 2018-01-27 RX ADMIN — ACETAMINOPHEN 650 MG: 325 TABLET, FILM COATED ORAL at 20:43

## 2018-01-27 RX ADMIN — WATER 1 G: 1 INJECTION INTRAMUSCULAR; INTRAVENOUS; SUBCUTANEOUS at 04:43

## 2018-01-27 RX ADMIN — CALCIUM CARBONATE 500 MG (1,250 MG)-VITAMIN D3 200 UNIT TABLET 1 TABLET: at 09:08

## 2018-01-27 RX ADMIN — Medication 10 ML: at 05:34

## 2018-01-27 RX ADMIN — CALCIUM CARBONATE 500 MG (1,250 MG)-VITAMIN D3 200 UNIT TABLET 1 TABLET: at 17:24

## 2018-01-27 RX ADMIN — ENOXAPARIN SODIUM 30 MG: 30 INJECTION SUBCUTANEOUS at 09:07

## 2018-01-27 RX ADMIN — ACETAMINOPHEN 650 MG: 325 TABLET, FILM COATED ORAL at 05:33

## 2018-01-27 RX ADMIN — DOCUSATE SODIUM 100 MG: 100 CAPSULE, LIQUID FILLED ORAL at 09:08

## 2018-01-27 RX ADMIN — OXYCODONE HYDROCHLORIDE 5 MG: 5 TABLET ORAL at 09:08

## 2018-01-27 NOTE — PROGRESS NOTES
Problem: Self Care Deficits Care Plan (Adult)  Goal: *Acute Goals and Plan of Care (Insert Text)  1. Aracelis Holden will complete self-feeding with minimal assistance or less. [address] will complete grooming/oral care with moderate assistance or less. [address] will complete upper body bathing/dressing with moderate assistance or less. [address] will complete toilet/bedside commode transfers with minimal assistance or less. Time frame: 4 - 7 visits    OCCUPATIONAL THERAPY: Initial Assessment and Treatment Day: 1st 1/27/2018  INPATIENT: Hospital Day: 3  [place] / Product Type: Medicare /      NAME/AGE/GENDER: Aracelis Holden is a 80 y.o. female   PRIMARY DIAGNOSIS:  Closed right hip fracture (HCC)  right hip fracture Closed right hip fracture (HCC) Closed right hip fracture (HCC)  Procedure(s) (LRB):  FEMUR INSERTION INTRA MEDULLARY NAIL (Right)  1 Day Post-Op  ICD-10: Treatment Diagnosis:    · Generalized Muscle Weakness (M62.81)  · Other lack of cordination (R27.8)  · History of falling (Z91.81)   Precautions/Allergies:    WBAT RLE; Tramadol; Morphine; Other medication; and Sulfa (sulfonamide antibiotics)      ASSESSMENT:     Ms. Michael Tobar presents s/p R hip fracture s/p ORIF and is typically a resident of [facility] with some function per friend. Ms. Michael Tobar presents with confusion requiring maximal assistance to feed herself. She required maximal assistance to stand twice. Aracelis Holden presents with the following problems and would benefit from occupational therapy to maximize independence with self-care,instrumental activities of daily living, and functional transfers/mobility for activities of daily living/instrumental activities of daily living via the stated goals. This section established at most recent assessment   PROBLEM LIST (Impairments causing functional limitations):  1. Decreased Strength  2.  Decreased ADL/Functional Activities  3. Decreased Transfer Abilities  4. Decreased Balance  5. Increased Pain  6. Decreased Activity Tolerance  7. Decreased Flexibility/Joint Mobility  8. Decreased Cognition   INTERVENTIONS PLANNED: (Benefits and precautions of occupational therapy have been discussed with the patient.)  1. Activities of daily living training  2. Cognitive training  3. Therapeutic activity  4. Therapeutic exercise     TREATMENT PLAN: Frequency/Duration: Follow patient 6 times/week to address above goals. Rehabilitation Potential For Stated Goals: Good     RECOMMENDED REHABILITATION/EQUIPMENT: (at time of discharge pending progress): Due to the probability of continued deficits (see above) this patient will likely need continued skilled occupational therapy after discharge. Equipment:    None at this time              OCCUPATIONAL PROFILE AND HISTORY:   History of Present Injury/Illness (Reason for Referral):  See H & P. Past Medical History/Comorbidities:   Ms. Starlyn Duverney  has a past medical history of Acute myocardial infarction of other anterior wall, initial episode of care [facility]) (8/30/2012); Acute systolic congestive heart failure- in setting of anterior MI, EF 35-40% (8/30/2012); Acute systolic heart failure ([address] ARF (acute renal failure) ([address] (9/29/2012); Arthritis; CAD (coronary artery disease); Carotid artery stenosis without cerebral infarction; Chronic pain- chronic narcotic use for spinal stenosis (8/30/2012); Coronary atherosclerosis of native coronary artery (3/16/2013); Diarrhea (10/2/2012); Dyslipidemia (8/30/2012); Dyspnea; Essential hypertension, benign (8/30/2012); Heart failure ([address] Hyperlipidemia; Hypertension; Ischemic colitis ([address] Lower GI bleed (3/12/2015); Myocardial infarction, anterolateral wall, subsequent care ([address] N&V (nausea and vomiting) (3/16/2013); Sepsis ([address] (2/12/2014);  Spinal stenosis (8/30/2012); ST elevation (STEMI) myocardial infarction involving left anterior descending coronary artery [address] and Syncope and collapse (2/12/2014). She also has no past medical history of Unspecified sleep apnea. Ms. Nicky Chin  has a past surgical history that includes hx cholecystectomy; hx gyn; hx appendectomy; hx orthopaedic; hx other surgical; hx coronary stent placement; and pr cardiac surg procedure unlist.  Social History/Living Environment:   Home Environment: [address] [place] [address] Name: Jhon Nolan/Sina [place] Residence: One story  Living Alone: No  Support Systems: Family member(s), Skilled nursing facility  Patient Expects to be Discharged to[de-identified] Skilled nursing facility  Current DME Used/Available at Home: Wheelchair  Prior Level of Function/Work/Activity:  Some function per visiting friend. Able to propel w/c and feed herself. Left alone for periods of time. Living at SNF for at least a year. Number of Personal Factors/Comorbidities that affect the Plan of Care: Expanded review of therapy/medical records (1-2):  MODERATE COMPLEXITY   ASSESSMENT OF OCCUPATIONAL PERFORMANCE[de-identified]   Activities of Daily Living:           Basic ADLs (From Assessment) Complex ADLs (From Assessment)   Basic ADL  Feeding: Maximum assistance  Oral Facial Hygiene/Grooming: Maximum assistance  Bathing: Total assistance  Upper Body Dressing: Total assistance  Lower Body Dressing: Total assistance  Toileting: Total assistance     Grooming/Bathing/Dressing Activities of Daily Living   Grooming  Brushing/Combing Hair: Maximum assistance Cognitive Retraining  Safety/Judgement: Fall prevention     Feeding  Feeding Assistance: Maximum assistance  Container Management: Total assistance (dependent)  Utensil Management: Moderate assistance  Food to Mouth: Moderate assistance  Drink to Mouth:  Moderate assistance  Cues: Tactile cues provided;Physical assistance;Verbal cues provided;Visual cues provided                 Bed/Mat Mobility  Supine to Sit: Maximum assistance  Sit to Stand: Maximum assistance  Bed to Chair: Total assistance;Maximum assistance  Scooting: Maximum assistance       Most Recent Physical Functioning:   Gross Assessment:  AROM: Generally decreased, functional (shoulders non-functional with limited elevation)  Strength: Generally decreased, functional (3+/5 elbow down; shoulders 2-/5)               Posture:  Posture (WDL): Exceptions to WDL  Posture Assessment: Scoliosis right, Increased  Balance:  Sitting: Impaired  Sitting - Static: Fair (occasional)  Sitting - Dynamic: Poor (constant support)  Standing: Impaired  Standing - Static: Poor  Standing - Dynamic : Poor Bed Mobility:  Supine to Sit: Maximum assistance  Scooting: Maximum assistance  Wheelchair Mobility:     Transfers:  Sit to Stand: Maximum assistance  Stand to Sit: Maximum assistance  Bed to Chair: Total assistance;Maximum assistance                Patient Vitals for the past 6 hrs:   BP BP Patient Position SpO2 Pulse   01/27/18 1034 (!) 86/54 Sitting 96 % 88   01/27/18 1056 106/57 Sitting 93 % 81   01/27/18 1130 99/64 At rest 95 % 82   01/27/18 1259 122/70 At rest 99 % 75   01/27/18 1348 122/74 At rest 98 % 75       Mental Status  Neurologic State: Confused  Orientation Level: Disoriented to situation, Disoriented to place, Oriented to person  Cognition: Decreased attention/concentration  Perception: Cues to maintain midline in standing  Perseveration: No perseveration noted  Safety/Judgement: Fall prevention                          Physical Skills Involved:  1. Range of Motion  2. Balance  3. Strength  4. Activity Tolerance  5. Pain (acute) Cognitive Skills Affected (resulting in the inability to perform in a timely and safe manner):  1. Perception  2. Executive Function  3. Immediate Memory  4. Short Term Recall  5. Long Term Memory  6. Sustained Attention  7. Divided Attention Psychosocial Skills Affected:  1. Habits/Routines  2. Environmental Adaptation  3. Social Interaction  4. Self-Awareness  5.  Awareness of Others  6. Social Roles   Number of elements that affect the Plan of Care: 3-5:  MODERATE COMPLEXITY   CLINICAL DECISION MAKING:   [employer] AM-[employer]AC[employer] 6 Clicks   Daily Activity Inpatient Short Form  How much help from another person does the patient currently need. .. Total A Lot A Little None   1. Putting on and taking off regular lower body clothing? [x] 1   [] 2   [] 3   [] 4   2. Bathing (including washing, rinsing, drying)? [x] 1   [] 2   [] 3   [] 4   3. Toileting, which includes using toilet, bedpan or urinal?   [x] 1   [] 2   [] 3   [] 4   4. Putting on and taking off regular upper body clothing? [x] 1   [] 2   [] 3   [] 4   5. Taking care of personal grooming such as brushing teeth? [x] 1   [] 2   [] 3   [] 4   6. Eating meals? [] 1   [x] 2   [] 3   [] 4   © 2007, Trustees of [facility] [employer], under license to [employer]. All rights reserved      Score:  Initial: 7 Most Recent: X (Date: -- )    Interpretation of Tool:  Represents activities that are increasingly more difficult (i.e. Bed mobility, Transfers, Gait). Score 24 23 22-20 19-15 14-10 9-7 6     Modifier CH CI CJ CK CL CM CN      ? Self Care:     - CURRENT STATUS: CM - 80%-99% impaired, limited or restricted    - GOAL STATUS: CK - 40%-59% impaired, limited or restricted    - D/C STATUS:  ---------------To be determined---------------  [place] / Product Type: Medicare /      Medical Necessity:     · Patient demonstrates fair rehab potential due to higher previous functional level. Reason for Services/Other Comments:  · Patient continues to require skilled intervention due to decreased independence with ADL and functional mobility for ADL.    Use of outcome tool(s) and clinical judgement create a POC that gives a: MODERATE COMPLEXITY         TREATMENT:   (In addition to Assessment/Re-Assessment sessions the following treatments were rendered)     Pre-treatment Symptoms/Complaints:    Pain: Initial:   Pain Intensity 1:  (complains of R hip pain - did not rate - RN aware )  Post Session:  same     Self Care: (25 minutes): Procedure(s) (per grid) utilized to improve and/or restore self-care/home management as related to grooming and self feeding. Required maximal visual, verbal, tactile and   cueing to facilitate activities of daily living skills and compensatory activities. Braces/Orthotics/Lines/Etc:   · IV  · O2 Device: Nasal cannula  Treatment/Session Assessment:    Response to Treatment:  Tolerated treatment well without complications. · Interdisciplinary Collaboration:   o Occupational Therapist  o Registered Nurse  · After treatment position/precautions:   o Up in chair  o Bed alarm/tab alert on  o Bed/Chair-wheels locked  o Call light within reach  o RN notified   · Compliance with Program/Exercises: Will assess as treatment progresses. · Recommendations/Intent for next treatment session: \"Next visit will focus on advancements to more challenging activities\".   Total Treatment Duration:  OT Patient Time In/Time Out  Time In: 1205 (1255)  Time Out: 1225 (1325)     CLAUDIA Jimenez, OTR/L

## 2018-01-27 NOTE — PROGRESS NOTES
Problem: Mobility Impaired (Adult and Pediatric)  Goal: *Acute Goals and Plan of Care (Insert Text)  STG:  (1.)Ms. Deepika Barraza will move from supine to sit and sit to supine , scoot up and down and roll side to side with MAXIMAL ASSIST within 3 day(s). (2.)Ms. Deepika Barraza will transfer from bed to chair and chair to bed with MAXIMAL ASSIST using the least restrictive device within 3 day(s). (3.)Ms. Deepika Barraza will participate in therapeutic activity/exerices x 12 minutes for increased strength within 3 days. (4.)Ms. Deepika Barraza will propel wheelchair 15 ft with STAND BY ASSIST for increased functional independence within 3 days. LTG:  (1.)Ms. Deepika Barraza will move from supine to sit and sit to supine , scoot up and down and roll side to side in bed with MODERATE ASSIST within 7 day(s). (2.)Ms. Deepika Barraza will transfer from bed to chair and chair to bed with MODERATE ASSIST using the least restrictive device within 7 day(s). (3.)Ms. Deepika Barraza will participate in therapeutic activity/exerices x 23 minutes for increased strength within 7 days. (4.)Ms. Deepika Barraza will propel wheelchair 50 ft with STAND BY ASSIST for increased functional independence within 7 days.   ________________________________________________________________________________________________      PHYSICAL THERAPY: Daily Note, Treatment Day: 1st, PM 1/27/2018  INPATIENT: Hospital Day: 3  [place] / Product Type: Medicare /    R SUNDAY WBAT     NAME/AGE/GENDER: Hernandez Johnson is a 80 y.o. female   PRIMARY DIAGNOSIS: Closed right hip fracture (HCC)  right hip fracture Closed right hip fracture (HCC) Closed right hip fracture (HCC)  Procedure(s) (LRB):  FEMUR INSERTION INTRA MEDULLARY NAIL (Right)  1 Day Post-Op  ICD-10: Treatment Diagnosis:   · Generalized Muscle Weakness (M62.81)  · Difficulty in walking, Not elsewhere classified (R26.2)  · Other abnormalities of gait and mobility (R26.89)  · History of falling (Z91.81) Precaution/Allergies:  Tramadol; Morphine; Other medication; and Sulfa (sulfonamide antibiotics)      ASSESSMENT:     Ms. Arielle Benjamin was sitting in chair at arrival with 2 children present. Pt reported she would like to stay in chair even though she has been in chair for 6hours. Once sitting at edge of chair, pt could hold self up, but needed prop sitting and 2 corrections before trnf to chair. Attempted is t-0 stand x3, but family members preferred to have pt stop. Nursing arrived requesting to change pt and needed her to be in bed. SPT to bed pt did not put any wt thru R LE and was trying to lift L,  MaxA to attempt hip ext while trnf took place. In bed after session propped up straight with pillows. Slow progression due to confusion and motivation and family members request not to participate. This section established at most recent assessment   PROBLEM LIST (Impairments causing functional limitations):  1. Decreased Strength  2. Decreased ADL/Functional Activities  3. Decreased Transfer Abilities  4. Decreased Ambulation Ability/Technique  5. Decreased Balance  6. Increased Pain  7. Decreased Activity Tolerance  8. Decreased Cognition   INTERVENTIONS PLANNED: (Benefits and precautions of physical therapy have been discussed with the patient.)  1. Balance Exercise  2. Bed Mobility  3. Family Education  4. Gait Training  5. Neuromuscular Re-education/Strengthening  6. Therapeutic Activites  7. Therapeutic Exercise/Strengthening  8. Transfer Training  9. Group Therapy     TREATMENT PLAN: Frequency/Duration: Twice times a week for duration of hospital stay  Rehabilitation Potential For Stated Goals: Guarded     RECOMMENDED REHABILITATION/EQUIPMENT: (at time of discharge pending progress): Due to the probability of continued deficits (see above) this patient will likely need continued skilled physical therapy after discharge.   Equipment:    None at this time              HISTORY:   History of Present Injury/Illness (Reason for Referral):  Per H&P: \"  HPI:   Patient history was obtained from the ER provider prior to seeing the patient.     Patient is a 80 y.o. female who presents to the ER after having a fall at her nursing home. She has lewy body dementia and cannot provide history about what happened. Family is here and states that she has actually fallen a few times. She was recently moved into a memory care unit for closer observation. She has had pain in rt hip since fall, and unable to bear weight. ER workup shows a hip fx. No known LOC.        ROS:  All systems have been reviewed and are negative except as stated in HPI or elsewhere. \"  Past Medical History/Comorbidities:   Ms. Desirae Randolph  has a past medical history of Acute myocardial infarction of other anterior wall, initial episode of care [facility]) (8/30/2012); Acute systolic congestive heart failure- in setting of anterior MI, EF 35-40% (8/30/2012); Acute systolic heart failure ([address] ARF (acute renal failure) ([address] (9/29/2012); Arthritis; CAD (coronary artery disease); Carotid artery stenosis without cerebral infarction; Chronic pain- chronic narcotic use for spinal stenosis (8/30/2012); Coronary atherosclerosis of native coronary artery (3/16/2013); Diarrhea (10/2/2012); Dyslipidemia (8/30/2012); Dyspnea; Essential hypertension, benign (8/30/2012); Heart failure ([address] Hyperlipidemia; Hypertension; Ischemic colitis ([address] Lower GI bleed (3/12/2015); Myocardial infarction, anterolateral wall, subsequent care ([address] N&V (nausea and vomiting) (3/16/2013); Sepsis ([address] (2/12/2014); Spinal stenosis (8/30/2012); ST elevation (STEMI) myocardial infarction involving left anterior descending coronary artery ([address] and Syncope and collapse (2/12/2014). She also has no past medical history of Unspecified sleep apnea.   Ms. Desirae Randolph  has a past surgical history that includes hx cholecystectomy; hx gyn; hx appendectomy; hx orthopaedic; hx other surgical; hx coronary stent placement; and pr cardiac surg procedure unlist.  Social History/Living Environment:   Home Environment: [address] [place] [address] Name: Jhon   [employer] [place] Residence: One story  Living Alone: No  Support Systems: Family member(s), Skilled nursing facility  Patient Expects to be Discharged to[de-identified] Skilled nursing facility  Current DME Used/Available at Home: Wheelchair  Prior Level of Function/Work/Activity:  Lives in memory care facility. Gets assistance for ADLs and is wheelchair bound. Recent falls reported. Number of Personal Factors/Comorbidities that affect the Plan of Care:  Dementia  Falls 3+: HIGH COMPLEXITY   EXAMINATION:   Most Recent Physical Functioning:   Gross Assessment:                  Posture:     Balance:  Sitting: Impaired  Sitting - Static: Fair (occasional)  Sitting - Dynamic: Poor (constant support)  Standing: Impaired  Standing - Static: Poor  Standing - Dynamic : Poor Bed Mobility:  Supine to Sit: Maximum assistance  Scooting: Total assistance  Wheelchair Mobility:     Transfers:  Sit to Stand: Maximum assistance  Stand to Sit: Moderate assistance  Bed to Chair: Maximum assistance  Gait:            Body Structures Involved:  1. Bones  2. Joints  3. Muscles Body Functions Affected:  1. Sensory/Pain  2. Neuromusculoskeletal  3. Movement Related Activities and Participation Affected:  1. Learning and Applying Knowledge  2. General Tasks and Demands  3. Communication  4. Mobility  5. Self Care  6. Domestic Life  7. Interpersonal Interactions and Relationships  8. Community, Social and [place]   Number of elements that affect the Plan of Care: 4+: HIGH COMPLEXITY   CLINICAL PRESENTATION:   Presentation: Evolving clinical presentation with changing clinical characteristics: MODERATE COMPLEXITY   CLINICAL DECISION MAKIN [facility] Inpatient Short Form  How much difficulty does the patient currently have. .. Unable A Lot A Little None   1. Turning over in bed (including adjusting bedclothes, sheets and blankets)? [] 1   [x] 2   [] 3   [] 4   2. Sitting down on and standing up from a chair with arms ( e.g., wheelchair, bedside commode, etc.)   [x] 1   [] 2   [] 3   [] 4   3. Moving from lying on back to sitting on the side of the bed? [x] 1   [] 2   [] 3   [] 4   How much help from another person does the patient currently need. .. Total A Lot A Little None   4. Moving to and from a bed to a chair (including a wheelchair)? [x] 1   [] 2   [] 3   [] 4   5. Need to walk in hospital room? [x] 1   [] 2   [] 3   [] 4   6. Climbing 3-5 steps with a railing? [x] 1   [] 2   [] 3   [] 4   © 2007, Trustees of [address] [place] [address], under license to [employer]. All rights reserved      Score:  Initial: 7 Most Recent: X (Date: -- )    Interpretation of Tool:  Represents activities that are increasingly more difficult (i.e. Bed mobility, Transfers, Gait). Score 24 23 22-20 19-15 14-10 9-7 6     Modifier CH CI CJ CK CL CM CN      ? Mobility - Walking and Moving Around:     - CURRENT STATUS: CM - 80%-99% impaired, limited or restricted    - GOAL STATUS: CL - 60%-79% impaired, limited or restricted    - D/C STATUS:  ---------------To be determined---------------  [place] / Product Type: Medicare /      Medical Necessity:     · Patient demonstrates GUARDED rehab potential due to higher previous functional level. Reason for Services/Other Comments:  · Patient continues to require skilled intervention due to decreased functional mobility.    Use of outcome tool(s) and clinical judgement create a POC that gives a: Questionable prediction of patient's progress: MODERATE COMPLEXITY            TREATMENT:   (In addition to Assessment/Re-Assessment sessions the following treatments were rendered)   Pre-treatment Symptoms/Complaints:  Pain observed with movement; lethargy  Pain: Initial:   Pain Intensity 1: 8  Pain Location 1: Hip  Post Session:  2     Therapeutic Activity: (    24min):  Therapeutic activities including Bed transfers and Chair transfers to improve mobility, strength, balance and coordination. Required maximal   to promote static and dynamic balance in standing. Braces/Orthotics/Lines/Etc:   · IV  · O2 Device: Nasal cannula  Treatment/Session Assessment:    · Response to Treatment:  Pt tolerated poorly given lethargy and decreased command following. · Interdisciplinary Collaboration:   o Physical Therapist  o Registered Nurse  · After treatment position/precautions:   o Supine in bed  o Bed alarm/tab alert on  o Bed/Chair-wheels locked  o Call light within reach  o RN notified  o Family at bedside   · Compliance with Program/Exercises: Will assess as treatment progresses. · Recommendations/Intent for next treatment session: \"Next visit will focus on advancements to more challenging activities and reduction in assistance provided\".   Total Treatment Duration:  PT Patient Time In/Time Out  Time In: 0873  Time Out: 98 Vikki Rothman, FERNANDEZ

## 2018-01-27 NOTE — PROGRESS NOTES
Due to pt low HCT hospitalist ordered 2 units PRBCs; fracture center nurse Dilip Hodges RN was notified and she requested that the order be modified to 1 unit of PRBCs.

## 2018-01-27 NOTE — PROGRESS NOTES
Hospitalist Progress Note     Admit Date:  2018  6:56 PM   Name:  Donnia Favre   Age:  80 y.o.  :  1932   MRN:  [id number]   PCP:  Rasheed Palmer MD  Treatment Team: Attending Provider: Annemarie Ward MD; Utilization Review: Gabby Nails; Unit Clerk: Araseli Velazco RN    Subjective: The patient denies any significant complaints. Her pain seems to be under control. Her Hb dropped to 7.7 and she is being transfused 2 units of PRBCs today. Objective:   Patient Vitals for the past 24 hrs:   Temp Pulse Resp BP SpO2   18 1348 98.9 °F (37.2 °C) 75 19 122/74 98 %   18 1259 98.3 °F (36.8 °C) 75 22 122/70 99 %   18 1130 97.8 °F (36.6 °C) 82 20 99/64 95 %   18 1056 98.2 °F (36.8 °C) 81 26 106/57 93 %   18 1034 98.8 °F (37.1 °C) 88 20 (!) 86/54 96 %   18 0800 - - - - 98 %   18 0705 98 °F (36.7 °C) 81 18 134/76 99 %   18 2237 98.2 °F (36.8 °C) 83 18 128/78 100 %   18 1925 97.1 °F (36.2 °C) (!) 103 18 113/54 93 %     Oxygen Therapy  O2 Sat (%): 98 % (18 1348)  Pulse via Oximetry: 73 beats per minute (18 0800)  O2 Device: Nasal cannula (18 08)  O2 Flow Rate (L/min): 2 l/min (decreased from 3) (18 0800)    Intake/Output Summary (Last 24 hours) at 18 1518  Last data filed at 18 1446   Gross per 24 hour   Intake             1712 ml   Output              350 ml   Net             1362 ml         General:    Well nourished. Alert. CV:   RRR. No murmur, rub, or gallop. Lungs:   Clear to auscultation bilaterally. No wheezing, rhonchi, or rales. Abdomen:   Soft, nontender, nondistended. Extremities: Warm and dry. No cyanosis or edema. Skin:     No rashes or jaundice. Data Review:  I have reviewed all labs, meds, telemetry events, and studies from the last 24 hours.     Recent Results (from the past 24 hour(s))   PLEASE READ & DOCUMENT PPD TEST IN 24 HRS    Collection Time: 18 12:24 AM Result Value Ref Range    PPD negative Negative    mm Induration 0 mm   CBC WITH AUTOMATED DIFF    Collection Time: 01/27/18  6:23 AM   Result Value Ref Range    WBC 9.2 4.3 - 11.1 K/uL    RBC 2.53 (L) 4.05 - 5.25 M/uL    HGB 7.7 (L) 11.7 - 15.4 g/dL    HCT 23.5 (L) 35.8 - 46.3 %    MCV 92.9 79.6 - 97.8 FL    MCH 30.4 26.1 - 32.9 PG    MCHC 32.8 31.4 - 35.0 g/dL    RDW 13.3 11.9 - 14.6 %    PLATELET 272 174 - 621 K/uL    MPV 10.0 (L) 10.8 - 14.1 FL    DF AUTOMATED      NEUTROPHILS 73 43 - 78 %    LYMPHOCYTES 16 13 - 44 %    MONOCYTES 11 4.0 - 12.0 %    EOSINOPHILS 0 (L) 0.5 - 7.8 %    BASOPHILS 0 0.0 - 2.0 %    IMMATURE GRANULOCYTES 0 0.0 - 5.0 %    ABS. NEUTROPHILS 6.7 1.7 - 8.2 K/UL    ABS. LYMPHOCYTES 1.4 0.5 - 4.6 K/UL    ABS. MONOCYTES 1.0 0.1 - 1.3 K/UL    ABS. EOSINOPHILS 0.0 0.0 - 0.8 K/UL    ABS. BASOPHILS 0.0 0.0 - 0.2 K/UL    ABS. IMM. GRANS. 0.0 0.0 - 0.5 K/UL   METABOLIC PANEL, BASIC    Collection Time: 01/27/18  6:23 AM   Result Value Ref Range    Sodium 138 136 - 145 mmol/L    Potassium 4.5 3.5 - 5.1 mmol/L    Chloride 102 98 - 107 mmol/L    CO2 27 21 - 32 mmol/L    Anion gap 9 7 - 16 mmol/L    Glucose 119 (H) 65 - 100 mg/dL    BUN 40 (H) 8 - 23 MG/DL    Creatinine 1.18 (H) 0.6 - 1.0 MG/DL    GFR est AA 56 (L) >60 ml/min/1.73m2    GFR est non-AA 46 (L) >60 ml/min/1.73m2    Calcium 9.3 8.3 - 10.4 MG/DL        All Micro Results     Procedure Component Value Units Date/Time    MSSA/MRSA SC BY PCR, NASAL SWAB [[id number]] Collected:  01/26/18 0035    Order Status:  Completed Specimen:  Swab Updated:  01/26/18 0414     Special Requests: NO SPECIAL REQUESTS        Culture result:         SA target not detected. A MRSA NEGATIVE, SA NEGATIVE test result does not preclude MRSA or SA nasal colonization.     MSSA/MRSA SC BY PCR, NASAL SWAB [[id number]]     Order Status:  Canceled Specimen:  Swab           Current Meds:  Current Facility-Administered Medications   Medication Dose Route Frequency    0.9% sodium chloride infusion 250 mL  250 mL IntraVENous PRN    HYDROmorphone (PF) (DILAUDID) injection 0.5 mg  0.5 mg IntraVENous Q4H PRN    sodium chloride (NS) flush 5-10 mL  5-10 mL IntraVENous Q8H    sodium chloride (NS) flush 5-10 mL  5-10 mL IntraVENous PRN    acetaminophen (TYLENOL) tablet 650 mg  650 mg Oral Q8H    oxyCODONE IR (ROXICODONE) tablet 5 mg  5 mg Oral Q4H PRN    ondansetron (ZOFRAN) injection 4 mg  4 mg IntraVENous Q4H PRN    docusate sodium (COLACE) capsule 100 mg  100 mg Oral BID    alum-mag hydroxide-simeth (MYLANTA) oral suspension 30 mL  30 mL Oral Q4H PRN    calcium-vitamin D (OS-YEIMY) 500 mg-200 unit tablet  1 Tab Oral TID WITH MEALS    enoxaparin (LOVENOX) injection 30 mg  30 mg SubCUTAneous Q24H    carvedilol (COREG) tablet 3.125 mg  3.125 mg Oral BID WITH MEALS    DULoxetine (CYMBALTA) capsule 60 mg  60 mg Oral QHS    pantoprazole (PROTONIX) tablet 40 mg  40 mg Oral ACB    lisinopril (PRINIVIL, ZESTRIL) tablet 10 mg  10 mg Oral DAILY    oxybutynin (DITROPAN) tablet 5 mg  5 mg Oral BID    rOPINIRole (REQUIP) tablet 5 mg  5 mg Oral QHS    rosuvastatin (CRESTOR) tablet 5 mg  5 mg Oral Q MON, WED & FRI    hydrALAZINE (APRESOLINE) 20 mg/mL injection 10 mg  10 mg IntraVENous Q6H PRN       Other Studies (last 24 hours):  No results found.     Assessment and Plan:     Hospital Problems as of 1/27/2018  Date Reviewed: 1/26/2018          Codes Class Noted - Resolved POA    * (Principal)Closed right hip fracture ([facility] [address] ICD-10-CM: S72.001A  ICD-9-CM: 820.8  1/25/2018 - Present Yes        DNR (do not resuscitate) (Chronic) ICD-10-CM: Z66  ICD-9-CM: V49.86  2/18/2017 - Present Yes        Lewy body dementia (Chronic) ICD-10-CM: G31.83, F02.80  ICD-9-CM: 331.82  2/17/2017 - Present Yes        CKD (chronic kidney disease) stage 3, GFR 30-59 ml/min (Chronic) ICD-10-CM: N18.3  ICD-9-CM: 585.3  2/17/2017 - Present Yes        Chronic diastolic congestive heart failure [facility]) (Chronic) ICD-10-CM: I50.32  ICD-9-CM: 428.32, 428.0  2/17/2017 - Present Yes        Essential hypertension, benign (Chronic) ICD-10-CM: I10  ICD-9-CM: 401.1  8/30/2012 - Present Yes        Chronic pain- chronic narcotic use for spinal stenosis (Chronic) ICD-10-CM: F83.22  ICD-9-CM: 338.29  8/30/2012 - Present Yes              1 - Right Intertrochanteric Hip Fracture. The patient had hip fracture repair yesterday. She seems to be doing well. Will likely need SNF for Rehab at discharge. Continue Dilaudid and Oxycodone prn for pain control. 2 - Postoperative Anemia. Hb dropped to 7.7 from 10.4 yesterday. The patient will be transfused 2 units of PRBCs, since she has a h/o CAD. 3 - CAD s/p stent, stable. Continue home medications. 4 - HTN, controlled. Continue home antihypertensive medications. 5 - Dyslipidemia, stable. Continue statins. 6 - Lewy body dementia, stable. Continue supportive treatment. 7 - DVT Prophylaxis with Lovenox. 8 - Disposition: possible d/c to SNF for rehab within the next 48-72 hours. Signed:   Jaron Keyes MD

## 2018-01-27 NOTE — PROGRESS NOTES
Hospitalist Progress Note     Admit Date:  2018  6:56 PM   Name:  Italo Mar   Age:  80 y.o.  :  1932   MRN:  [id number]   PCP:  John Weinstein MD  Treatment Team: Attending Provider: Radha Bustamante MD; Utilization Review: [address]    Subjective: The patient is an 81 y/o lady with Lewy Body Dementia, CAD s/p stent, HTN, Dyslipidemia, Spinal stenosis, was admitted last night for R hip fracture. The patient had her fracture repaired this morning. She looks comfortable at the bedside and her pain seems to be under control. Objective:   Patient Vitals for the past 24 hrs:   Temp Pulse Resp BP SpO2   18 1447 97.4 °F (36.3 °C) 82 17 108/75 100 %   18 1151 - - - 141/79 100 %   18 1148 - 68 - - -   18 1133 - 65 - 138/75 94 %   18 1118 - 65 - 150/78 95 %   18 1103 - 65 - 138/71 96 %   18 1048 97.9 °F (36.6 °C) 68 16 128/73 98 %   18 0716 97 °F (36.1 °C) 72 14 166/88 100 %   18 0417 98.7 °F (37.1 °C) 80 20 137/79 98 %   18 0107 98.5 °F (36.9 °C) 91 20 (!) 165/93 95 %   18 - - - (!) 149/102 -   18 - 96 - - 97 %   18 - 93 - (!) 166/115 99 %   18 -  - (!) 175/99 97 %   18 - - - (!) 169/91 -     Oxygen Therapy  O2 Sat (%): 100 % (18 1447)  Pulse via Oximetry: 96 beats per minute (18 220)  O2 Device: Nasal cannula (18 1048)    Intake/Output Summary (Last 24 hours) at 18  Last data filed at 18 1820   Gross per 24 hour   Intake             1040 ml   Output              650 ml   Net              390 ml         General:    Well nourished. Alert. CV:   RRR. No murmur, rub, or gallop. Lungs:   Clear to auscultation bilaterally. No wheezing, rhonchi, or rales. Abdomen:   Soft, nontender, nondistended. Extremities: Warm and dry. No cyanosis or edema. Skin:     No rashes or jaundice.      Data Review:  I have reviewed all labs, meds, telemetry events, and studies from the last 24 hours. Recent Results (from the past 24 hour(s))   URINALYSIS W/ RFLX MICROSCOPIC    Collection Time: 01/25/18 10:15 PM   Result Value Ref Range    Color YELLOW      Appearance CLEAR      Specific gravity 1.015 1.001 - 1.023      pH (UA) 8.0 5.0 - 9.0      Protein 30 (A) NEG mg/dL    Glucose NEGATIVE  NEG mg/dL    Ketone TRACE (A) NEG mg/dL    Bilirubin NEGATIVE  NEG      Blood NEGATIVE  NEG      Urobilinogen 0.2 0.2 - 1.0 EU/dL    Nitrites NEGATIVE  NEG      Leukocyte Esterase NEGATIVE  NEG      Bacteria 0 0 /hpf   MSSA/MRSA SC BY PCR, NASAL SWAB    Collection Time: 01/26/18 12:35 AM   Result Value Ref Range    Special Requests: NO SPECIAL REQUESTS      Culture result:        SA target not detected. A MRSA NEGATIVE, SA NEGATIVE test result does not preclude MRSA or SA nasal colonization.    PTH INTACT    Collection Time: 01/26/18  6:26 AM   Result Value Ref Range    Calcium 9.2 8.3 - 10.4 MG/DL    PTH, Intact 233.5 (H) 14.0 - 72.0 pg/mL   PREALBUMIN    Collection Time: 01/26/18  6:26 AM   Result Value Ref Range    Prealbumin 24.1 18.0 - 51.7 MG/DL   METABOLIC PANEL, BASIC    Collection Time: 01/26/18  6:26 AM   Result Value Ref Range    Sodium 138 136 - 145 mmol/L    Potassium 4.4 3.5 - 5.1 mmol/L    Chloride 102 98 - 107 mmol/L    CO2 27 21 - 32 mmol/L    Anion gap 9 7 - 16 mmol/L    Glucose 159 (H) 65 - 100 mg/dL    BUN 33 (H) 8 - 23 MG/DL    Creatinine 1.29 (H) 0.6 - 1.0 MG/DL    GFR est AA 50 (L) >60 ml/min/1.73m2    GFR est non-AA 42 (L) >60 ml/min/1.73m2    Calcium 9.2 8.3 - 10.4 MG/DL   CBC WITH AUTOMATED DIFF    Collection Time: 01/26/18  6:26 AM   Result Value Ref Range    WBC 9.3 4.3 - 11.1 K/uL    RBC 3.43 (L) 4.05 - 5.25 M/uL    HGB 10.4 (L) 11.7 - 15.4 g/dL    HCT 31.8 (L) 35.8 - 46.3 %    MCV 92.7 79.6 - 97.8 FL    MCH 30.3 26.1 - 32.9 PG    MCHC 32.7 31.4 - 35.0 g/dL    RDW 13.3 11.9 - 14.6 %    PLATELET 721 630 - 441 K/uL    MPV 9.9 (L) 10.8 - 14.1 FL    DF AUTOMATED      NEUTROPHILS 78 43 - 78 %    LYMPHOCYTES 15 13 - 44 %    MONOCYTES 7 4.0 - 12.0 %    EOSINOPHILS 0 (L) 0.5 - 7.8 %    BASOPHILS 0 0.0 - 2.0 %    IMMATURE GRANULOCYTES 0 0.0 - 5.0 %    ABS. NEUTROPHILS 7.2 1.7 - 8.2 K/UL    ABS. LYMPHOCYTES 1.4 0.5 - 4.6 K/UL    ABS. MONOCYTES 0.7 0.1 - 1.3 K/UL    ABS. EOSINOPHILS 0.0 0.0 - 0.8 K/UL    ABS. BASOPHILS 0.0 0.0 - 0.2 K/UL    ABS. IMM. GRANS. 0.0 0.0 - 0.5 K/UL        All Micro Results     Procedure Component Value Units Date/Time    MSSA/MRSA SC BY PCR, NASAL SWAB [[id number]] Collected:  01/26/18 0035    Order Status:  Completed Specimen:  Swab Updated:  01/26/18 0414     Special Requests: NO SPECIAL REQUESTS        Culture result:         SA target not detected. A MRSA NEGATIVE, SA NEGATIVE test result does not preclude MRSA or SA nasal colonization.     MSSA/MRSA SC BY PCR, NASAL SWAB [[id number]]     Order Status:  Canceled Specimen:  Swab           Current Meds:  Current Facility-Administered Medications   Medication Dose Route Frequency    HYDROmorphone (PF) (DILAUDID) injection 0.5 mg  0.5 mg IntraVENous Q4H PRN    lactated Ringers infusion  75 mL/hr IntraVENous CONTINUOUS    sodium chloride (NS) flush 5-10 mL  5-10 mL IntraVENous Q8H    sodium chloride (NS) flush 5-10 mL  5-10 mL IntraVENous PRN    acetaminophen (TYLENOL) tablet 650 mg  650 mg Oral Q8H    oxyCODONE IR (ROXICODONE) tablet 5 mg  5 mg Oral Q4H PRN    ondansetron (ZOFRAN) injection 4 mg  4 mg IntraVENous Q4H PRN    docusate sodium (COLACE) capsule 100 mg  100 mg Oral BID    alum-mag hydroxide-simeth (MYLANTA) oral suspension 30 mL  30 mL Oral Q4H PRN    calcium-vitamin D (OS-YEIMY) 500 mg-200 unit tablet  1 Tab Oral TID WITH MEALS    [START ON 1/27/2018] enoxaparin (LOVENOX) injection 30 mg  30 mg SubCUTAneous Q24H    ceFAZolin (ANCEF) 1 g in sterile water (preservative free) 10 mL IV syringe  1 g IntraVENous Q12H    carvedilol (COREG) tablet 3.125 mg  3.125 mg Oral BID WITH MEALS    DULoxetine (CYMBALTA) capsule 60 mg  60 mg Oral QHS    pantoprazole (PROTONIX) tablet 40 mg  40 mg Oral ACB    lisinopril (PRINIVIL, ZESTRIL) tablet 10 mg  10 mg Oral DAILY    oxybutynin (DITROPAN) tablet 5 mg  5 mg Oral BID    rOPINIRole (REQUIP) tablet 5 mg  5 mg Oral QHS    rosuvastatin (CRESTOR) tablet 5 mg  5 mg Oral Q MON, WED & FRI    tuberculin injection 5 Units  5 Units IntraDERMal ONCE    hydrALAZINE (APRESOLINE) 20 mg/mL injection 10 mg  10 mg IntraVENous Q6H PRN       Other Studies (last 24 hours):  Xr Chest Sngl V    Result Date: 1/25/2018  AP chest radiograph History: fall, right leg shortening, 86 years Female Comparison: Chest radiograph February 27, 2017 Findings:   Normal cardiomediastinal silhouette. Mild diffuse interstitial prominence unchanged, nonspecific. Mild subsegmental atelectasis bilateral lung bases. No evidence of pneumothorax, pleural effusion, or air space consolidation. Visualized soft tissue and osseous structures otherwise unremarkable. Impression:  No significant interval change. Xr Hip Rt W Or Wo Pelv 2-3 Vws    Result Date: 1/25/2018  Exam:  AP pelvis and right hip, right femur radiographs History:  pain, fall, right leg shortening, 86 years Female Comparison: None available Findings: There is an acute mildly displaced and angulated right proximal femoral intertrochanteric fracture. The right femoral head is otherwise well seated within the acetabulum. Intramedullary emily fixation of the left proximal femur appears in anatomic alignment. The visualized pelvis appears intact. Evidence of right knee total arthroplasty. The visualized distal right femur appears intact. There appears to be an enthesophyte arising from the right ischial tuberosity. Mild diffuse osteopenia. Visualized soft tissues otherwise unremarkable.      Impression: Acute mildly displaced right proximal femoral intertrochanteric fracture. Xr Femur Rt 2 Vs    Result Date: 1/26/2018  Right femur Clinical indication: Acute pain, right femur intratrochanteric fracture, fall Comparison: 1/25/2018 Technique: Spot fluoroscopic intraoperative AP and lateral views during a procedure by the Department of surgery Findings: There is partial visualization of intramedullary fixation hardware in the right femur. Alignment at the previous intertrochanteric fracture site appears improved. Skin staples are noted within soft tissues laterally. There is partial visualization of knee arthroplasty hardware and surgical changes at the level of the patella undersurface. Portions of the femur are not visualized, overall limiting detailed evaluation. Fluoroscopy time and images: 1 minute 59 seconds, with 4 images submitted. Impression: Fixation of right femur intratrochanteric fracture. Please see the operative report for further detail. Xr Femur Rt 2 Vs    Result Date: 1/25/2018  Exam:  AP pelvis and right hip, right femur radiographs History:  pain, fall, right leg shortening, 86 years Female Comparison: None available Findings: There is an acute mildly displaced and angulated right proximal femoral intertrochanteric fracture. The right femoral head is otherwise well seated within the acetabulum. Intramedullary emily fixation of the left proximal femur appears in anatomic alignment. The visualized pelvis appears intact. Evidence of right knee total arthroplasty. The visualized distal right femur appears intact. There appears to be an enthesophyte arising from the right ischial tuberosity. Mild diffuse osteopenia. Visualized soft tissues otherwise unremarkable. Impression: Acute mildly displaced right proximal femoral intertrochanteric fracture.      Ct Head Wo Cont    Result Date: 1/25/2018  Examination: CT scan of the brain without contrast. History: fall head injury, 80 years Female Pt arrives via EMS from [address] [place] [address] for an unwitnessed fall and was found next to her bed Technique: 5 mm axial imaging of the brain from the posterior fossa to the vertex. Radiation dose reduction techniques were used for this study:  Our CT scanners use one or all of the following: Automated exposure control, adjustment of the mA and/or kVp according to patient's size, iterative reconstruction. Comparison:  CT brain April 18, 2015 Findings: Persistent probable moderate microvascular disease with senescent related atrophy. The ventricles, sulci are age-appropriate. No intracranial hemorrhage or extra-axial collection is identified. No evidence of acute infarct. No mass effect or midline shift is present. Basal cisterns are intact. The visualized paranasal sinuses and mastoid air cells are clear. The orbits, bones, and soft tissues are normal in appearance. Image quality somewhat degraded by motion artifact. IMPRESSION:  No acute intracranial abnormality. Ct Spine Cerv Wo Cont    Result Date: 1/25/2018  Exam:  CT cervical spine without contrast History: fall, 80 years Female Pt arrives via EMS from [address] [place] [address] for an unwitnessed fall and was found next to her bed Technique: Standard departmental protocol CT of the cervical spine was performed without intravenous contrast administration. Coronal and sagittal reformats were obtained. Radiation dose reduction techniques were used for this study: Our CT scanners use one or all of the following: Automated exposure control, adjustment of the mA and/or kVp according to patient's size, iterative reconstruction. Comparison: CT cervical spine March 16, 2013 Findings: Persistent mild grade 1 anterolisthesis of the C7 vertebral body on T1. Mild anterior loss of vertebral body height of C6 and C7 and significant loss of disc space height C6-T1 with small anterior and posterior endplate osteophytes appear progressed since prior. .  No evidence of acute fracture.   No evidence of significant spinal canal stenosis. Mild diffuse osteopenia. Visualized prevertebral and paravertebral soft tissues unremarkable. Image quality slightly degraded by motion artifact. Impression:  No definite evidence of acute injury. Interval progression of mild lower cervical degenerative disc disease as above. Assessment and Plan:     Hospital Problems as of 1/26/2018  Date Reviewed: 1/26/2018          Codes Class Noted - Resolved POA    * (Principal)Closed right hip fracture ([address] ICD-10-CM: S72.001A  ICD-9-CM: 820.8  1/25/2018 - Present Yes        DNR (do not resuscitate) (Chronic) ICD-10-CM: Z66  ICD-9-CM: V49.86  2/18/2017 - Present Yes        Lewy body dementia (Chronic) ICD-10-CM: G31.83, F02.80  ICD-9-CM: 331.82  2/17/2017 - Present Yes        CKD (chronic kidney disease) stage 3, GFR 30-59 ml/min (Chronic) ICD-10-CM: N18.3  ICD-9-CM: 585.3  2/17/2017 - Present Yes        Chronic diastolic congestive heart failure ([facility]) (Chronic) ICD-10-CM: I50.32  ICD-9-CM: 428.32, 428.0  2/17/2017 - Present Yes        Essential hypertension, benign (Chronic) ICD-10-CM: I10  ICD-9-CM: 401.1  8/30/2012 - Present Yes        Chronic pain- chronic narcotic use for spinal stenosis (Chronic) ICD-10-CM: N45.50  ICD-9-CM: 338.29  8/30/2012 - Present Yes              1 - Right Intertrochanteric Hip Fracture. The patient had hip fracture repair this morning. She seems to be doing well. Will likely need SNF for Rehab at discharge. Continue Dilaudid and Oxycodone prn for pain control. 2 - CAD s/p stent, stable. Resume home medications. 3 - HTN, controlled. Continue home antihypertensive medications. 4 - Dyslipidemia, stable. Continue statins. 5 - Lewy body dementia, stable. Continue supportive treatment. 6 - DVT Prophylaxis with Lovenox. 7 - Disposition: possible d/c to SNF for rehab within the next 48-72 hours. Signed:   Jaron Keyes MD

## 2018-01-27 NOTE — PROGRESS NOTES
Problem: Falls - Risk of  Goal: *Absence of Falls  Document Cali Fall Risk and appropriate interventions in the flowsheet.    Outcome: Progressing Towards Goal  Fall Risk Interventions:  Mobility Interventions: Bed/chair exit alarm    Mentation Interventions: Adequate sleep, hydration, pain control, Bed/chair exit alarm, Door open when patient unattended    Medication Interventions: Assess postural VS orthostatic hypotension, Bed/chair exit alarm    Elimination Interventions: Bed/chair exit alarm, Call light in reach    History of Falls Interventions: Bed/chair exit alarm, Door open when patient unattended

## 2018-01-27 NOTE — PROGRESS NOTES
Problem: Mobility Impaired (Adult and Pediatric)  Goal: *Acute Goals and Plan of Care (Insert Text)  STG:  (1.)Ms. Raven Ochoa will move from supine to sit and sit to supine , scoot up and down and roll side to side with MAXIMAL ASSIST within 3 day(s). (2.)Ms. Raven Ochoa will transfer from bed to chair and chair to bed with MAXIMAL ASSIST using the least restrictive device within 3 day(s). (3.)Ms. Raven Ochoa will participate in therapeutic activity/exerices x 12 minutes for increased strength within 3 days. (4.)Ms. Raven Ochoa will propel wheelchair 15 ft with STAND BY ASSIST for increased functional independence within 3 days. LTG:  (1.)Ms. Raven Ochoa will move from supine to sit and sit to supine , scoot up and down and roll side to side in bed with MODERATE ASSIST within 7 day(s). (2.)Ms. Raven Ochoa will transfer from bed to chair and chair to bed with MODERATE ASSIST using the least restrictive device within 7 day(s). (3.)Ms. Raven Ochoa will participate in therapeutic activity/exerices x 23 minutes for increased strength within 7 days. (4.)Ms. Raven Ochoa will propel wheelchair 50 ft with STAND BY ASSIST for increased functional independence within 7 days.   ________________________________________________________________________________________________      PHYSICAL THERAPY: Daily Note, Treatment Day: 1st, AM 1/27/2018  INPATIENT: Hospital Day: 3  [place] / [place] / Product Type: Medicare /    R SUNDAY WBAT     NAME/AGE/GENDER: Nella Mijares is a 80 y.o. female   PRIMARY DIAGNOSIS: Closed right hip fracture (HCC)  right hip fracture Closed right hip fracture (HCC) Closed right hip fracture (HCC)  Procedure(s) (LRB):  FEMUR INSERTION INTRA MEDULLARY NAIL (Right)  1 Day Post-Op  ICD-10: Treatment Diagnosis:   · Generalized Muscle Weakness (M62.81)  · Difficulty in walking, Not elsewhere classified (R26.2)  · Other abnormalities of gait and mobility (R26.89)  · History of falling (Z91.81) Precaution/Allergies:  Tramadol; Morphine; Other medication; and Sulfa (sulfonamide antibiotics)      ASSESSMENT:     Ms. Sonido Mike was supine at arrival with fully SB neck to L and some trunk SB to L with pt reporting soreness on R side of neck. Pt had some participation with reachign for rail toward L, moving R LE toward EOB but needing maxA to sitting. Once sitting reuired maxA to scoot to EOB where pt could hold self up, but needed prop sitting and 2 corrections before trnf to chair. SPT to chair pt did not put any wt thru R LE and was trying to lift L, but could not due to block. MaxA to attempt hip ext while trnf took place. In chair after session propped up straight with pillows. Slow progression due to confusion and motivation. This section established at most recent assessment   PROBLEM LIST (Impairments causing functional limitations):  1. Decreased Strength  2. Decreased ADL/Functional Activities  3. Decreased Transfer Abilities  4. Decreased Ambulation Ability/Technique  5. Decreased Balance  6. Increased Pain  7. Decreased Activity Tolerance  8. Decreased Cognition   INTERVENTIONS PLANNED: (Benefits and precautions of physical therapy have been discussed with the patient.)  1. Balance Exercise  2. Bed Mobility  3. Family Education  4. Gait Training  5. Neuromuscular Re-education/Strengthening  6. Therapeutic Activites  7. Therapeutic Exercise/Strengthening  8. Transfer Training  9. Group Therapy     TREATMENT PLAN: Frequency/Duration: Twice times a week for duration of hospital stay  Rehabilitation Potential For Stated Goals: Guarded     RECOMMENDED REHABILITATION/EQUIPMENT: (at time of discharge pending progress): Due to the probability of continued deficits (see above) this patient will likely need continued skilled physical therapy after discharge.   Equipment:    None at this time              HISTORY:   History of Present Injury/Illness (Reason for Referral):  Per H&P: \"  HPI:   Patient history was obtained from the ER provider prior to seeing the patient.     Patient is a 80 y.o. female who presents to the ER after having a fall at her nursing home. She has lewy body dementia and cannot provide history about what happened. Family is here and states that she has actually fallen a few times. She was recently moved into a memory care unit for closer observation. She has had pain in rt hip since fall, and unable to bear weight. ER workup shows a hip fx. No known LOC.        ROS:  All systems have been reviewed and are negative except as stated in HPI or elsewhere. \"  Past Medical History/Comorbidities:   Ms. Shanda Geiger  has a past medical history of Acute myocardial infarction of other anterior wall, initial episode of care [facility]) (8/30/2012); Acute systolic congestive heart failure- in setting of anterior MI, EF 35-40% (8/30/2012); Acute systolic heart failure ([address] ARF (acute renal failure) ([address] (9/29/2012); Arthritis; CAD (coronary artery disease); Carotid artery stenosis without cerebral infarction; Chronic pain- chronic narcotic use for spinal stenosis (8/30/2012); Coronary atherosclerosis of native coronary artery (3/16/2013); Diarrhea (10/2/2012); Dyslipidemia (8/30/2012); Dyspnea; Essential hypertension, benign (8/30/2012); Heart failure ([address] Hyperlipidemia; Hypertension; Ischemic colitis ([address] Lower GI bleed (3/12/2015); Myocardial infarction, anterolateral wall, subsequent care ([address] N&V (nausea and vomiting) (3/16/2013); Sepsis ([address] (2/12/2014); Spinal stenosis (8/30/2012); ST elevation (STEMI) myocardial infarction involving left anterior descending coronary artery ([address] and Syncope and collapse (2/12/2014). She also has no past medical history of Unspecified sleep apnea.   Ms. Shanda Geiger  has a past surgical history that includes hx cholecystectomy; hx gyn; hx appendectomy; hx orthopaedic; hx other surgical; hx coronary stent placement; and pr cardiac surg procedure unlist.  Social History/Living Environment:   Home Environment: [address] [place] [address] Name: Jhon Nolan/[facility] [place] Residence: One story  Living Alone: No  Support Systems: Family member(s), Skilled nursing facility  Patient Expects to be Discharged to[de-identified] Skilled nursing facility  Current DME Used/Available at Home: Wheelchair  Prior Level of Function/Work/Activity:  Lives in memory care facility. Gets assistance for ADLs and is wheelchair bound. Recent falls reported. Number of Personal Factors/Comorbidities that affect the Plan of Care:  Dementia  Falls 3+: HIGH COMPLEXITY   EXAMINATION:   Most Recent Physical Functioning:   Gross Assessment:                  Posture:     Balance:  Sitting: Impaired  Sitting - Static: Poor (constant support)  Sitting - Dynamic: Poor (constant support)  Standing: Impaired  Standing - Static: Poor  Standing - Dynamic : Poor Bed Mobility:  Supine to Sit: Maximum assistance  Scooting: Maximum assistance  Wheelchair Mobility:     Transfers:  Sit to Stand: Maximum assistance; Additional time  Stand to Sit: Maximum assistance  Bed to Chair: Total assistance;Maximum assistance  Gait:            Body Structures Involved:  1. Bones  2. Joints  3. Muscles Body Functions Affected:  1. Sensory/Pain  2. Neuromusculoskeletal  3. Movement Related Activities and Participation Affected:  1. Learning and Applying Knowledge  2. General Tasks and Demands  3. Communication  4. Mobility  5. Self Care  6. Domestic Life  7. Interpersonal Interactions and Relationships  8. Community, Social and [place]   Number of elements that affect the Plan of Care: 4+: HIGH COMPLEXITY   CLINICAL PRESENTATION:   Presentation: Evolving clinical presentation with changing clinical characteristics: MODERATE COMPLEXITY   CLINICAL DECISION MAKIN [facility] Inpatient Short Form  How much difficulty does the patient currently have. .. Unable A Lot A Little None   1. Turning over in bed (including adjusting bedclothes, sheets and blankets)? [] 1   [x] 2   [] 3   [] 4   2. Sitting down on and standing up from a chair with arms ( e.g., wheelchair, bedside commode, etc.)   [x] 1   [] 2   [] 3   [] 4   3. Moving from lying on back to sitting on the side of the bed? [x] 1   [] 2   [] 3   [] 4   How much help from another person does the patient currently need. .. Total A Lot A Little None   4. Moving to and from a bed to a chair (including a wheelchair)? [x] 1   [] 2   [] 3   [] 4   5. Need to walk in hospital room? [x] 1   [] 2   [] 3   [] 4   6. Climbing 3-5 steps with a railing? [x] 1   [] 2   [] 3   [] 4   © 2007, Trustees of [address] [place] [address], under license to [employer]. All rights reserved      Score:  Initial: 7 Most Recent: X (Date: -- )    Interpretation of Tool:  Represents activities that are increasingly more difficult (i.e. Bed mobility, Transfers, Gait). Score 24 23 22-20 19-15 14-10 9-7 6     Modifier CH CI CJ CK CL CM CN      ? Mobility - Walking and Moving Around:     - CURRENT STATUS: CM - 80%-99% impaired, limited or restricted    - GOAL STATUS: CL - 60%-79% impaired, limited or restricted    - D/C STATUS:  ---------------To be determined---------------  [place] / [place] / Product Type: Medicare /      Medical Necessity:     · Patient demonstrates GUARDED rehab potential due to higher previous functional level. Reason for Services/Other Comments:  · Patient continues to require skilled intervention due to decreased functional mobility.    Use of outcome tool(s) and clinical judgement create a POC that gives a: Questionable prediction of patient's progress: MODERATE COMPLEXITY            TREATMENT:   (In addition to Assessment/Re-Assessment sessions the following treatments were rendered)   Pre-treatment Symptoms/Complaints:  Pain observed with movement; lethargy  Pain: Initial:   Pain Location 1: Hip  Post Session:  2     Therapeutic Activity: (    24min):  Therapeutic activities including Bed transfers and Chair transfers to improve mobility, strength, balance and coordination. Required maximal   to promote static and dynamic balance in standing. Braces/Orthotics/Lines/Etc:   · IV  · O2 Device: Nasal cannula  Treatment/Session Assessment:    · Response to Treatment:  Pt tolerated poorly given lethargy and decreased command following. · Interdisciplinary Collaboration:   o Physical Therapist  o Registered Nurse  · After treatment position/precautions:   o Up in chair  o Bed alarm/tab alert on  o Bed/Chair-wheels locked  o Call light within reach  o RN notified  o Family at bedside   · Compliance with Program/Exercises: Will assess as treatment progresses. · Recommendations/Intent for next treatment session: \"Next visit will focus on advancements to more challenging activities and reduction in assistance provided\".   Total Treatment Duration:  PT Patient Time In/Time Out  Time In: 0825  Time Out: [address], DPT

## 2018-01-28 LAB
ANION GAP SERPL CALC-SCNC: 7 MMOL/L (ref 7–16)
BASOPHILS # BLD: 0 K/UL (ref 0–0.2)
BASOPHILS NFR BLD: 0 % (ref 0–2)
BUN SERPL-MCNC: 32 MG/DL (ref 8–23)
CALCIUM SERPL-MCNC: 9 MG/DL (ref 8.3–10.4)
CHLORIDE SERPL-SCNC: 101 MMOL/L (ref 98–107)
CO2 SERPL-SCNC: 28 MMOL/L (ref 21–32)
CREAT SERPL-MCNC: 0.81 MG/DL (ref 0.6–1)
DIFFERENTIAL METHOD BLD: ABNORMAL
EOSINOPHIL # BLD: 0 K/UL (ref 0–0.8)
EOSINOPHIL NFR BLD: 0 % (ref 0.5–7.8)
ERYTHROCYTE [DISTWIDTH] IN BLOOD BY AUTOMATED COUNT: 14.6 % (ref 11.9–14.6)
ERYTHROCYTE [DISTWIDTH] IN BLOOD BY AUTOMATED COUNT: 15.5 % (ref 11.9–14.6)
GLUCOSE SERPL-MCNC: 103 MG/DL (ref 65–100)
HCT VFR BLD AUTO: 23.1 % (ref 35.8–46.3)
HCT VFR BLD AUTO: 28.9 % (ref 35.8–46.3)
HGB BLD-MCNC: 7.7 G/DL (ref 11.7–15.4)
HGB BLD-MCNC: 9.6 G/DL (ref 11.7–15.4)
IMM GRANULOCYTES # BLD: 0 K/UL (ref 0–0.5)
IMM GRANULOCYTES NFR BLD AUTO: 0 % (ref 0–5)
LYMPHOCYTES # BLD: 1.3 K/UL (ref 0.5–4.6)
LYMPHOCYTES NFR BLD: 17 % (ref 13–44)
MCH RBC QN AUTO: 29.7 PG (ref 26.1–32.9)
MCH RBC QN AUTO: 29.8 PG (ref 26.1–32.9)
MCHC RBC AUTO-ENTMCNC: 33.2 G/DL (ref 31.4–35)
MCHC RBC AUTO-ENTMCNC: 33.3 G/DL (ref 31.4–35)
MCV RBC AUTO: 89.2 FL (ref 79.6–97.8)
MCV RBC AUTO: 89.8 FL (ref 79.6–97.8)
MM INDURATION POC: 0 MM (ref 0–5)
MONOCYTES # BLD: 0.8 K/UL (ref 0.1–1.3)
MONOCYTES NFR BLD: 10 % (ref 4–12)
NEUTS SEG # BLD: 5.7 K/UL (ref 1.7–8.2)
NEUTS SEG NFR BLD: 73 % (ref 43–78)
PLATELET # BLD AUTO: 185 K/UL (ref 150–450)
PLATELET # BLD AUTO: 187 K/UL (ref 150–450)
PMV BLD AUTO: 9.8 FL (ref 10.8–14.1)
PMV BLD AUTO: 9.9 FL (ref 10.8–14.1)
POTASSIUM SERPL-SCNC: 4.1 MMOL/L (ref 3.5–5.1)
PPD POC: NEGATIVE NEGATIVE
RBC # BLD AUTO: 2.59 M/UL (ref 4.05–5.25)
RBC # BLD AUTO: 3.22 M/UL (ref 4.05–5.25)
SODIUM SERPL-SCNC: 136 MMOL/L (ref 136–145)
WBC # BLD AUTO: 7.8 K/UL (ref 4.3–11.1)
WBC # BLD AUTO: 8.7 K/UL (ref 4.3–11.1)

## 2018-01-28 PROCEDURE — 74011250637 HC RX REV CODE- 250/637: Performed by: NURSE PRACTITIONER

## 2018-01-28 PROCEDURE — 80048 BASIC METABOLIC PNL TOTAL CA: CPT | Performed by: NURSE PRACTITIONER

## 2018-01-28 PROCEDURE — 74011250637 HC RX REV CODE- 250/637: Performed by: FAMILY MEDICINE

## 2018-01-28 PROCEDURE — 36415 COLL VENOUS BLD VENIPUNCTURE: CPT | Performed by: NURSE PRACTITIONER

## 2018-01-28 PROCEDURE — P9016 RBC LEUKOCYTES REDUCED: HCPCS | Performed by: EMERGENCY MEDICINE

## 2018-01-28 PROCEDURE — 85027 COMPLETE CBC AUTOMATED: CPT | Performed by: INTERNAL MEDICINE

## 2018-01-28 PROCEDURE — 85025 COMPLETE CBC W/AUTO DIFF WBC: CPT | Performed by: NURSE PRACTITIONER

## 2018-01-28 PROCEDURE — 97530 THERAPEUTIC ACTIVITIES: CPT

## 2018-01-28 PROCEDURE — 65270000029 HC RM PRIVATE

## 2018-01-28 PROCEDURE — 74011250636 HC RX REV CODE- 250/636: Performed by: NURSE PRACTITIONER

## 2018-01-28 PROCEDURE — 36430 TRANSFUSION BLD/BLD COMPNT: CPT

## 2018-01-28 RX ORDER — SODIUM CHLORIDE 9 MG/ML
250 INJECTION, SOLUTION INTRAVENOUS AS NEEDED
Status: DISCONTINUED | OUTPATIENT
Start: 2018-01-28 | End: 2018-01-29 | Stop reason: SDUPTHER

## 2018-01-28 RX ORDER — SODIUM CHLORIDE 9 MG/ML
250 INJECTION, SOLUTION INTRAVENOUS AS NEEDED
Status: DISCONTINUED | OUTPATIENT
Start: 2018-01-28 | End: 2018-01-31 | Stop reason: HOSPADM

## 2018-01-28 RX ADMIN — CARVEDILOL 3.12 MG: 3.12 TABLET, FILM COATED ORAL at 17:08

## 2018-01-28 RX ADMIN — Medication 10 ML: at 21:46

## 2018-01-28 RX ADMIN — Medication 5 ML: at 13:19

## 2018-01-28 RX ADMIN — OXYCODONE HYDROCHLORIDE 5 MG: 5 TABLET ORAL at 11:32

## 2018-01-28 RX ADMIN — OXYBUTYNIN CHLORIDE 5 MG: 5 TABLET ORAL at 21:49

## 2018-01-28 RX ADMIN — CARVEDILOL 3.12 MG: 3.12 TABLET, FILM COATED ORAL at 08:14

## 2018-01-28 RX ADMIN — PANTOPRAZOLE SODIUM 40 MG: 40 TABLET, DELAYED RELEASE ORAL at 05:30

## 2018-01-28 RX ADMIN — ACETAMINOPHEN 650 MG: 325 TABLET, FILM COATED ORAL at 13:18

## 2018-01-28 RX ADMIN — DOCUSATE SODIUM 100 MG: 100 CAPSULE, LIQUID FILLED ORAL at 08:13

## 2018-01-28 RX ADMIN — CALCIUM CARBONATE 500 MG (1,250 MG)-VITAMIN D3 200 UNIT TABLET 1 TABLET: at 08:15

## 2018-01-28 RX ADMIN — ROPINIROLE HYDROCHLORIDE 5 MG: 2 TABLET, FILM COATED ORAL at 21:49

## 2018-01-28 RX ADMIN — OXYCODONE HYDROCHLORIDE 5 MG: 5 TABLET ORAL at 21:48

## 2018-01-28 RX ADMIN — DOCUSATE SODIUM 100 MG: 100 CAPSULE, LIQUID FILLED ORAL at 17:08

## 2018-01-28 RX ADMIN — Medication 10 ML: at 05:30

## 2018-01-28 RX ADMIN — LISINOPRIL 10 MG: 5 TABLET ORAL at 08:13

## 2018-01-28 RX ADMIN — OXYCODONE HYDROCHLORIDE 5 MG: 5 TABLET ORAL at 05:30

## 2018-01-28 RX ADMIN — CALCIUM CARBONATE 500 MG (1,250 MG)-VITAMIN D3 200 UNIT TABLET 1 TABLET: at 11:32

## 2018-01-28 RX ADMIN — ACETAMINOPHEN 650 MG: 325 TABLET, FILM COATED ORAL at 05:30

## 2018-01-28 RX ADMIN — CALCIUM CARBONATE 500 MG (1,250 MG)-VITAMIN D3 200 UNIT TABLET 1 TABLET: at 17:08

## 2018-01-28 RX ADMIN — DULOXETINE HYDROCHLORIDE 60 MG: 60 CAPSULE, DELAYED RELEASE ORAL at 21:51

## 2018-01-28 RX ADMIN — ACETAMINOPHEN 650 MG: 325 TABLET, FILM COATED ORAL at 21:51

## 2018-01-28 RX ADMIN — OXYBUTYNIN CHLORIDE 5 MG: 5 TABLET ORAL at 08:15

## 2018-01-28 RX ADMIN — ENOXAPARIN SODIUM 30 MG: 30 INJECTION SUBCUTANEOUS at 08:15

## 2018-01-28 NOTE — PROGRESS NOTES
Problem: Falls - Risk of  Goal: *Absence of Falls  Document Cali Fall Risk and appropriate interventions in the flowsheet.    Outcome: Progressing Towards Goal  Fall Risk Interventions:  Mobility Interventions: Bed/chair exit alarm, Communicate number of staff needed for ambulation/transfer, OT consult for ADLs, PT Consult for mobility concerns, PT Consult for assist device competence    Mentation Interventions: Adequate sleep, hydration, pain control, Bed/chair exit alarm, Door open when patient unattended, Evaluate medications/consider consulting pharmacy    Medication Interventions: Assess postural VS orthostatic hypotension, Bed/chair exit alarm, Evaluate medications/consider consulting pharmacy, Patient to call before getting OOB    Elimination Interventions: Bed/chair exit alarm, Call light in reach, Toileting schedule/hourly rounds    History of Falls Interventions: Bed/chair exit alarm, Consult care management for discharge planning, Door open when patient unattended

## 2018-01-28 NOTE — PROGRESS NOTES
2018         Post Op day: 2 Days Post-Op Procedure(s) (LRB):  FEMUR INSERTION INTRA MEDULLARY NAIL (Right)      Admit Date: 2018  Admit Diagnosis: Closed right hip fracture (HCC)  right hip fracture       Principle Problem: Closed right hip fracture ([address] Subjective: Complains of right hip pain     Objective:   Vital Signs are Stable, No Acute Distress, Alert , Dressing with some bloody drainage. Assessment / Plan :  Patient Active Problem List   Diagnosis Code    Essential hypertension, benign I10    Dyslipidemia E78.5    Spinal stenosis M48.00    Chronic pain- chronic narcotic use for spinal stenosis G89.29    Coronary atherosclerosis of native coronary artery I25.10    Lewy body dementia G31.83, F02.80    CKD (chronic kidney disease) stage 3, GFR 30-59 ml/min N18.3    Chronic diastolic congestive heart failure (HCC) I50.32    DNR (do not resuscitate) Z66    Closed right hip fracture ([facility] S72.001A    Patient Vitals for the past 8 hrs:   BP Temp Pulse Resp SpO2   18 0720 135/74 98.8 °F (37.1 °C) 76 18 93 %   18 0248 156/77 98.6 °F (37 °C) 73 18 95 %    Temp (24hrs), Av.4 °F (36.9 °C), Min:97.7 °F (36.5 °C), Max:98.9 °F (37.2 °C)    Body mass index is 27.13 kg/(m^2). Lab Results   Component Value Date/Time    HGB 7.7 2018 05:46 AM        Continue PT.   Will transfuse       Signed By: Latonya Henson MD

## 2018-01-28 NOTE — PROGRESS NOTES
Problem: Mobility Impaired (Adult and Pediatric)  Goal: *Acute Goals and Plan of Care (Insert Text)  STG:  (1.)Ms. Hendrickson Lung will move from supine to sit and sit to supine , scoot up and down and roll side to side with MAXIMAL ASSIST within 3 day(s). (2.)Ms. Hendrickson Lung will transfer from bed to chair and chair to bed with MAXIMAL ASSIST using the least restrictive device within 3 day(s). (3.)Ms. Hendrickson Lung will participate in therapeutic activity/exerices x 12 minutes for increased strength within 3 days. (4.)Ms. Hendrickson Lung will propel wheelchair 15 ft with STAND BY ASSIST for increased functional independence within 3 days. LTG:  (1.)Ms. Hendrickson Lung will move from supine to sit and sit to supine , scoot up and down and roll side to side in bed with MODERATE ASSIST within 7 day(s). (2.)Ms. Hendrickson Lung will transfer from bed to chair and chair to bed with MODERATE ASSIST using the least restrictive device within 7 day(s). (3.)Ms. Hendrickson Lung will participate in therapeutic activity/exerices x 23 minutes for increased strength within 7 days. (4.)Ms. Hendrickson Lung will propel wheelchair 50 ft with STAND BY ASSIST for increased functional independence within 7 days.   ________________________________________________________________________________________________      PHYSICAL THERAPY: Daily Note, Treatment Day: 2nd, PM 1/28/2018  INPATIENT: Hospital Day: 4  [place] / Product Type: Medicare /    R LE WBAT     NAME/AGE/GENDER: Thomas Merida is a 80 y.o. female   PRIMARY DIAGNOSIS: Closed right hip fracture (HCC)  right hip fracture Closed right hip fracture (HCC) Closed right hip fracture (HCC)  Procedure(s) (LRB):  FEMUR INSERTION INTRA MEDULLARY NAIL (Right)  2 Days Post-Op  ICD-10: Treatment Diagnosis:   · Generalized Muscle Weakness (M62.81)  · Difficulty in walking, Not elsewhere classified (R26.2)  · Other abnormalities of gait and mobility (R26.89)  · History of falling (Z91.81) Precaution/Allergies:  Tramadol; Morphine; Other medication; and Sulfa (sulfonamide antibiotics)      ASSESSMENT:     Ms. Starlyn Duverney was supine in bed upon contact. Agreeable. Bed mobility requires max to total assist.  Additional time required as patient does display some resistance. Patient was seated edge of bed with support. Transfers bed to chair with max to total assistance x 1. Very little participation from the patient. Slow progression due to confusion and motivation, however the patient is very pleasant and cooperative and even thanked staff at the end of the session. Alarm intact and needs within reach. No progress this treatment session. Will continue PT efforts. PM treatment:  Patient is sitting in recliner. She is pleasant but confused. Sit to stand with max to total assist.  Once standing the patient has no weight bearing on either LE to assist with transfer even with verbal cues. Patient can sit edge of bed with less assistance but requires total assist x 2  for sit to supine. No progress demonstrated this treatment session. Alarm intact and needs within reach. Will continue PT efforts. This section established at most recent assessment   PROBLEM LIST (Impairments causing functional limitations):  1. Decreased Strength  2. Decreased ADL/Functional Activities  3. Decreased Transfer Abilities  4. Decreased Ambulation Ability/Technique  5. Decreased Balance  6. Increased Pain  7. Decreased Activity Tolerance  8. Decreased Cognition   INTERVENTIONS PLANNED: (Benefits and precautions of physical therapy have been discussed with the patient.)  1. Balance Exercise  2. Bed Mobility  3. Family Education  4. Gait Training  5. Neuromuscular Re-education/Strengthening  6. Therapeutic Activites  7. Therapeutic Exercise/Strengthening  8. Transfer Training  9.  Group Therapy TREATMENT PLAN: Frequency/Duration: Twice times a week for duration of hospital stay  Rehabilitation Potential For Stated Goals: Guarded     RECOMMENDED REHABILITATION/EQUIPMENT: (at time of discharge pending progress): Due to the probability of continued deficits (see above) this patient will likely need continued skilled physical therapy after discharge. Equipment:    None at this time              HISTORY:   History of Present Injury/Illness (Reason for Referral):  Per H&P: \"  HPI:   Patient history was obtained from the ER provider prior to seeing the patient.     Patient is a 80 y.o. female who presents to the ER after having a fall at her nursing home. She has lewy body dementia and cannot provide history about what happened. Family is here and states that she has actually fallen a few times. She was recently moved into a memory care unit for closer observation. She has had pain in rt hip since fall, and unable to bear weight. ER workup shows a hip fx. No known LOC.        ROS:  All systems have been reviewed and are negative except as stated in HPI or elsewhere. \"  Past Medical History/Comorbidities:   Ms. Penny Alexis  has a past medical history of Acute myocardial infarction of other anterior wall, initial episode of care [facility]) (8/30/2012); Acute systolic congestive heart failure- in setting of anterior MI, EF 35-40% (8/30/2012); Acute systolic heart failure ([address] ARF (acute renal failure) ([address] (9/29/2012); Arthritis; CAD (coronary artery disease); Carotid artery stenosis without cerebral infarction; Chronic pain- chronic narcotic use for spinal stenosis (8/30/2012); Coronary atherosclerosis of native coronary artery (3/16/2013); Diarrhea (10/2/2012); Dyslipidemia (8/30/2012); Dyspnea; Essential hypertension, benign (8/30/2012); Heart failure ([address] Hyperlipidemia; Hypertension; Ischemic colitis ([address] Lower GI bleed (3/12/2015);  Myocardial infarction, anterolateral wall, subsequent care ([address] N&V (nausea and vomiting) (3/16/2013); Sepsis ([facility] (2/12/2014); Spinal stenosis (8/30/2012); ST elevation (STEMI) myocardial infarction involving left anterior descending coronary artery ([facility] and Syncope and collapse (2/12/2014). She also has no past medical history of Unspecified sleep apnea. Ms. Nicky Chin  has a past surgical history that includes hx cholecystectomy; hx gyn; hx appendectomy; hx orthopaedic; hx other surgical; hx coronary stent placement; and pr cardiac surg procedure unlist.  Social History/Living Environment:   Home Environment: [address] [place] [address] Name: Jhon   One/Two Story Residence: One story  Living Alone: No  Support Systems: Family member(s), Skilled nursing facility  Patient Expects to be Discharged to[de-identified] Skilled nursing facility  Current DME Used/Available at Home: Wheelchair  Prior Level of Function/Work/Activity:  Lives in memory care facility. Gets assistance for ADLs and is wheelchair bound. Recent falls reported. Number of Personal Factors/Comorbidities that affect the Plan of Care:  Dementia  Falls 3+: HIGH COMPLEXITY   EXAMINATION:   Most Recent Physical Functioning:   Gross Assessment:                  Posture:     Balance:  Sitting: Impaired  Sitting - Static: Poor (constant support)  Sitting - Dynamic: Poor (constant support)  Standing: Impaired  Standing - Static: Poor  Standing - Dynamic : Poor Bed Mobility:  Rolling: Moderate assistance;Maximum assistance  Supine to Sit: Maximum assistance; Total assistance;Assist x1  Sit to Supine: Moderate assistance;Maximum assistance  Scooting: Moderate assistance;Maximum assistance  Wheelchair Mobility:     Transfers:  Sit to Stand: Moderate assistance;Maximum assistance  Stand to Sit: Moderate assistance;Maximum assistance  Stand Pivot Transfers: Assist x1  Gait:            Body Structures Involved:  1. Bones  2. Joints  3. Muscles Body Functions Affected:  1. Sensory/Pain  2. Neuromusculoskeletal  3.  Movement Related Activities and Participation Affected:  1. Learning and Applying Knowledge  2. General Tasks and Demands  3. Communication  4. Mobility  5. Self Care  6. Domestic Life  7. Interpersonal Interactions and Relationships  8. Community, Social and [place]   Number of elements that affect the Plan of Care: 4+: HIGH COMPLEXITY   CLINICAL PRESENTATION:   Presentation: Evolving clinical presentation with changing clinical characteristics: MODERATE COMPLEXITY   CLINICAL DECISION MAKIN [facility] Mobility Inpatient Short Form  How much difficulty does the patient currently have. .. Unable A Lot A Little None   1. Turning over in bed (including adjusting bedclothes, sheets and blankets)? [] 1   [x] 2   [] 3   [] 4   2. Sitting down on and standing up from a chair with arms ( e.g., wheelchair, bedside commode, etc.)   [x] 1   [] 2   [] 3   [] 4   3. Moving from lying on back to sitting on the side of the bed? [x] 1   [] 2   [] 3   [] 4   How much help from another person does the patient currently need. .. Total A Lot A Little None   4. Moving to and from a bed to a chair (including a wheelchair)? [x] 1   [] 2   [] 3   [] 4   5. Need to walk in hospital room? [x] 1   [] 2   [] 3   [] 4   6. Climbing 3-5 steps with a railing? [x] 1   [] 2   [] 3   [] 4   © , Trustees of [address] [place] [address], under license to [employer]. All rights reserved      Score:  Initial: 7 Most Recent: X (Date: -- )    Interpretation of Tool:  Represents activities that are increasingly more difficult (i.e. Bed mobility, Transfers, Gait). Score 24 23 22-20 19-15 14-10 9-7 6     Modifier CH CI CJ CK CL CM CN      ?  Mobility - Walking and Moving Around:     - CURRENT STATUS: CM - 80%-99% impaired, limited or restricted    - GOAL STATUS: CL - 60%-79% impaired, limited or restricted    - D/C STATUS:  ---------------To be determined---------------  [place] / [place] / Product Type: Medicare /      Medical Necessity:     · Patient demonstrates GUARDED rehab potential due to higher previous functional level. Reason for Services/Other Comments:  · Patient continues to require skilled intervention due to decreased functional mobility. Use of outcome tool(s) and clinical judgement create a POC that gives a: Questionable prediction of patient's progress: MODERATE COMPLEXITY            TREATMENT:   (In addition to Assessment/Re-Assessment sessions the following treatments were rendered)   Pre-treatment Symptoms/Complaints: Patient confused but pleasant  Pain: Initial:   Pain Intensity 1: 0  Post Session:  Voices no c/o     Therapeutic Activity: (    14min):  Therapeutic activities including Bed transfers and Chair transfers to improve mobility, strength, balance and coordination. Required maximal to total assistance x 1   to promote static and dynamic balance in standing. Braces/Orthotics/Lines/Etc:   · IV  · O2 Device: Nasal cannula  Treatment/Session Assessment:    · Response to Treatment:  Pt tolerated fairly  · Interdisciplinary Collaboration:   o Physical Therapy Assistant  o Registered Nurse  o Certified Nursing Assistant/Patient Care Technician  · After treatment position/precautions:   o Supine in bed  o Bed alarm/tab alert on  o Bed/Chair-wheels locked  o Bed in low position  o Call light within reach  o RN notified   · Compliance with Program/Exercises: Will assess as treatment progresses. · Recommendations/Intent for next treatment session: \"Next visit will focus on advancements to more challenging activities and reduction in assistance provided\".   Total Treatment Duration:  PT Patient Time In/Time Out  Time In: 1220  Time Out: [address] Marie Dent, PTA

## 2018-01-28 NOTE — PROGRESS NOTES
Problem: Mobility Impaired (Adult and Pediatric)  Goal: *Acute Goals and Plan of Care (Insert Text)  STG:  (1.)Ms. Chuck Maddox will move from supine to sit and sit to supine , scoot up and down and roll side to side with MAXIMAL ASSIST within 3 day(s). (2.)Ms. Chuck Maddox will transfer from bed to chair and chair to bed with MAXIMAL ASSIST using the least restrictive device within 3 day(s). (3.)Ms. Chuck Maddox will participate in therapeutic activity/exerices x 12 minutes for increased strength within 3 days. (4.)Ms. Chuck Maddox will propel wheelchair 15 ft with STAND BY ASSIST for increased functional independence within 3 days. LTG:  (1.)Ms. Chuck Maddox will move from supine to sit and sit to supine , scoot up and down and roll side to side in bed with MODERATE ASSIST within 7 day(s). (2.)Ms. Chuck Maddox will transfer from bed to chair and chair to bed with MODERATE ASSIST using the least restrictive device within 7 day(s). (3.)Ms. Chuck Maddox will participate in therapeutic activity/exerices x 23 minutes for increased strength within 7 days. (4.)Ms. Chuck Maddox will propel wheelchair 50 ft with STAND BY ASSIST for increased functional independence within 7 days.   ________________________________________________________________________________________________      PHYSICAL THERAPY: Daily Note, Treatment Day: 2nd, AM 1/28/2018  INPATIENT: Hospital Day: 4  [place] / Product Type: Medicare /    R SUNDAY WBAT     NAME/AGE/GENDER: Charlie Mo is a 80 y.o. female   PRIMARY DIAGNOSIS: Closed right hip fracture (HCC)  right hip fracture Closed right hip fracture (HCC) Closed right hip fracture (HCC)  Procedure(s) (LRB):  FEMUR INSERTION INTRA MEDULLARY NAIL (Right)  2 Days Post-Op  ICD-10: Treatment Diagnosis:   · Generalized Muscle Weakness (M62.81)  · Difficulty in walking, Not elsewhere classified (R26.2)  · Other abnormalities of gait and mobility (R26.89)  · History of falling (Z91.81) Precaution/Allergies:  Tramadol; Morphine; Other medication; and Sulfa (sulfonamide antibiotics)      ASSESSMENT:     Ms. Woods Seat was supine in bed upon contact. Agreeable. Bed mobility requires max to total assist.  Additional time required as patient does display some resistance. Patient was seated edge of bed with support. Transfers bed to chair with max to total assistance x 1. Very little participation from the patient. Slow progression due to confusion and motivation, however the patient is very pleasant and cooperative and even thanked staff at the end of the session. Alarm intact and needs within reach. No progress this treatment session. Will continue PT efforts. This section established at most recent assessment   PROBLEM LIST (Impairments causing functional limitations):  1. Decreased Strength  2. Decreased ADL/Functional Activities  3. Decreased Transfer Abilities  4. Decreased Ambulation Ability/Technique  5. Decreased Balance  6. Increased Pain  7. Decreased Activity Tolerance  8. Decreased Cognition   INTERVENTIONS PLANNED: (Benefits and precautions of physical therapy have been discussed with the patient.)  1. Balance Exercise  2. Bed Mobility  3. Family Education  4. Gait Training  5. Neuromuscular Re-education/Strengthening  6. Therapeutic Activites  7. Therapeutic Exercise/Strengthening  8. Transfer Training  9. Group Therapy     TREATMENT PLAN: Frequency/Duration: Twice times a week for duration of hospital stay  Rehabilitation Potential For Stated Goals: Guarded     RECOMMENDED REHABILITATION/EQUIPMENT: (at time of discharge pending progress): Due to the probability of continued deficits (see above) this patient will likely need continued skilled physical therapy after discharge.   Equipment:    None at this time              HISTORY:   History of Present Injury/Illness (Reason for Referral):  Per H&P: \"  HPI:   Patient history was obtained from the ER provider prior to seeing the patient.     Patient is a 80 y.o. female who presents to the ER after having a fall at her nursing home. She has lewy body dementia and cannot provide history about what happened. Family is here and states that she has actually fallen a few times. She was recently moved into a memory care unit for closer observation. She has had pain in rt hip since fall, and unable to bear weight. ER workup shows a hip fx. No known LOC.        ROS:  All systems have been reviewed and are negative except as stated in HPI or elsewhere. \"  Past Medical History/Comorbidities:   Ms. Patrice Man  has a past medical history of Acute myocardial infarction of other anterior wall, initial episode of care [facility]) (8/30/2012); Acute systolic congestive heart failure- in setting of anterior MI, EF 35-40% (8/30/2012); Acute systolic heart failure ([address] ARF (acute renal failure) ([address] (9/29/2012); Arthritis; CAD (coronary artery disease); Carotid artery stenosis without cerebral infarction; Chronic pain- chronic narcotic use for spinal stenosis (8/30/2012); Coronary atherosclerosis of native coronary artery (3/16/2013); Diarrhea (10/2/2012); Dyslipidemia (8/30/2012); Dyspnea; Essential hypertension, benign (8/30/2012); Heart failure ([address] Hyperlipidemia; Hypertension; Ischemic colitis ([address] Lower GI bleed (3/12/2015); Myocardial infarction, anterolateral wall, subsequent care ([address] N&V (nausea and vomiting) (3/16/2013); Sepsis ([address] (2/12/2014); Spinal stenosis (8/30/2012); ST elevation (STEMI) myocardial infarction involving left anterior descending coronary artery ([address] and Syncope and collapse (2/12/2014). She also has no past medical history of Unspecified sleep apnea.   Ms. Patrice Man  has a past surgical history that includes hx cholecystectomy; hx gyn; hx appendectomy; hx orthopaedic; hx other surgical; hx coronary stent placement; and pr cardiac surg procedure unlist.  Social History/Living Environment:   Home Environment: [address] Name: Jhon   One/Two [place] Residence: One story  Living Alone: No  Support Systems: Family member(s), Skilled nursing facility  Patient Expects to be Discharged to[de-identified] Skilled nursing facility  Current DME Used/Available at Home: Wheelchair  Prior Level of Function/Work/Activity:  Lives in memory care facility. Gets assistance for ADLs and is wheelchair bound. Recent falls reported. Number of Personal Factors/Comorbidities that affect the Plan of Care:  Dementia  Falls 3+: HIGH COMPLEXITY   EXAMINATION:   Most Recent Physical Functioning:   Gross Assessment:                  Posture:     Balance:  Sitting: Impaired  Sitting - Static: Poor (constant support)  Sitting - Dynamic: Poor (constant support)  Standing: Impaired  Standing - Static: Poor  Standing - Dynamic : Poor Bed Mobility:  Rolling: Maximum assistance;Assist x1  Supine to Sit: Maximum assistance; Total assistance;Assist x1  Scooting: Total assistance;Assist x1  Wheelchair Mobility:     Transfers:  Sit to Stand: Maximum assistance; Total assistance;Assist x1  Stand to Sit: Maximum assistance; Total assistance;Assist x1  Stand Pivot Transfers: Assist x1  Gait:            Body Structures Involved:  1. Bones  2. Joints  3. Muscles Body Functions Affected:  1. Sensory/Pain  2. Neuromusculoskeletal  3. Movement Related Activities and Participation Affected:  1. Learning and Applying Knowledge  2. General Tasks and Demands  3. Communication  4. Mobility  5. Self Care  6. Domestic Life  7. Interpersonal Interactions and Relationships  8.  Community, Social and [place]   Number of elements that affect the Plan of Care: 4+: HIGH COMPLEXITY   CLINICAL PRESENTATION:   Presentation: Evolving clinical presentation with changing clinical characteristics: MODERATE COMPLEXITY   CLINICAL DECISION MAKIN [facility] Mobility Inpatient Short Form  How much difficulty does the patient currently have... Unable A Lot A Little None   1. Turning over in bed (including adjusting bedclothes, sheets and blankets)? [] 1   [x] 2   [] 3   [] 4   2. Sitting down on and standing up from a chair with arms ( e.g., wheelchair, bedside commode, etc.)   [x] 1   [] 2   [] 3   [] 4   3. Moving from lying on back to sitting on the side of the bed? [x] 1   [] 2   [] 3   [] 4   How much help from another person does the patient currently need. .. Total A Lot A Little None   4. Moving to and from a bed to a chair (including a wheelchair)? [x] 1   [] 2   [] 3   [] 4   5. Need to walk in hospital room? [x] 1   [] 2   [] 3   [] 4   6. Climbing 3-5 steps with a railing? [x] 1   [] 2   [] 3   [] 4   © 2007, Trustees of [address] [place] [address] [address] [address], under license to [employer]. All rights reserved      Score:  Initial: 7 Most Recent: X (Date: -- )    Interpretation of Tool:  Represents activities that are increasingly more difficult (i.e. Bed mobility, Transfers, Gait). Score 24 23 22-20 19-15 14-10 9-7 6     Modifier CH CI CJ CK CL CM CN      ? Mobility - Walking and Moving Around:     - CURRENT STATUS: CM - 80%-99% impaired, limited or restricted    - GOAL STATUS: CL - 60%-79% impaired, limited or restricted    - D/C STATUS:  ---------------To be determined---------------  [place] / Product Type: Medicare /      Medical Necessity:     · Patient demonstrates GUARDED rehab potential due to higher previous functional level. Reason for Services/Other Comments:  · Patient continues to require skilled intervention due to decreased functional mobility.    Use of outcome tool(s) and clinical judgement create a POC that gives a: Questionable prediction of patient's progress: MODERATE COMPLEXITY            TREATMENT:   (In addition to Assessment/Re-Assessment sessions the following treatments were rendered)   Pre-treatment Symptoms/Complaints: Patient confused but pleasant  Pain: Initial:   Pain Intensity 1: 0  Post Session:  Voices no c/o     Therapeutic Activity: (    24min):  Therapeutic activities including Bed transfers and Chair transfers to improve mobility, strength, balance and coordination. Required maximal to total assistance x 1    to promote static and dynamic balance in standing. Braces/Orthotics/Lines/Etc:   · IV  · O2 Device: Nasal cannula  Treatment/Session Assessment:    · Response to Treatment:  Pt tolerated fairly  · Interdisciplinary Collaboration:   o Physical Therapy Assistant  o Registered Nurse  · After treatment position/precautions:   o Up in chair  o Bed alarm/tab alert on  o Bed/Chair-wheels locked  o Call light within reach  o RN notified   · Compliance with Program/Exercises: Will assess as treatment progresses. · Recommendations/Intent for next treatment session: \"Next visit will focus on advancements to more challenging activities and reduction in assistance provided\".   Total Treatment Duration:  PT Patient Time In/Time Out  Time In: 7569  Time Out: 1519    Joselo Montenegro, PTA

## 2018-01-28 NOTE — PROGRESS NOTES
LMSW spoke with pt's daughter Lionel Mark [phone] about pt care planning. Pt has been in a rehab to medicaid bed at [facility] since last year. Daughter wanted pt to go to [facility] but they had no bed when referred last year. Agreed to place a referral to [facility] to see if they have availability but daughter also cautioned that if they do not pt will need to return to [facility].   Will alert coworker to follow up with daughter about [facility] referral.

## 2018-01-29 LAB
ABO + RH BLD: NORMAL
ANION GAP SERPL CALC-SCNC: 8 MMOL/L (ref 7–16)
BASOPHILS # BLD: 0 K/UL (ref 0–0.2)
BASOPHILS NFR BLD: 0 % (ref 0–2)
BLD PROD TYP BPU: NORMAL
BLOOD GROUP ANTIBODIES SERPL: NORMAL
BPU ID: NORMAL
BUN SERPL-MCNC: 24 MG/DL (ref 8–23)
CALCIUM SERPL-MCNC: 9.1 MG/DL (ref 8.3–10.4)
CHLORIDE SERPL-SCNC: 99 MMOL/L (ref 98–107)
CO2 SERPL-SCNC: 28 MMOL/L (ref 21–32)
CREAT SERPL-MCNC: 0.64 MG/DL (ref 0.6–1)
CROSSMATCH RESULT,%XM: NORMAL
DIFFERENTIAL METHOD BLD: ABNORMAL
EOSINOPHIL # BLD: 0.1 K/UL (ref 0–0.8)
EOSINOPHIL NFR BLD: 1 % (ref 0.5–7.8)
ERYTHROCYTE [DISTWIDTH] IN BLOOD BY AUTOMATED COUNT: 14.3 % (ref 11.9–14.6)
GLUCOSE SERPL-MCNC: 84 MG/DL (ref 65–100)
HCT VFR BLD AUTO: 27.4 % (ref 35.8–46.3)
HGB BLD-MCNC: 9.3 G/DL (ref 11.7–15.4)
IMM GRANULOCYTES # BLD: 0 K/UL (ref 0–0.5)
IMM GRANULOCYTES NFR BLD AUTO: 1 % (ref 0–5)
LYMPHOCYTES # BLD: 1.3 K/UL (ref 0.5–4.6)
LYMPHOCYTES NFR BLD: 17 % (ref 13–44)
MCH RBC QN AUTO: 30 PG (ref 26.1–32.9)
MCHC RBC AUTO-ENTMCNC: 33.9 G/DL (ref 31.4–35)
MCV RBC AUTO: 88.4 FL (ref 79.6–97.8)
MM INDURATION POC: 0 MM (ref 0–5)
MONOCYTES # BLD: 0.7 K/UL (ref 0.1–1.3)
MONOCYTES NFR BLD: 9 % (ref 4–12)
NEUTS SEG # BLD: 5.4 K/UL (ref 1.7–8.2)
NEUTS SEG NFR BLD: 72 % (ref 43–78)
PLATELET # BLD AUTO: 200 K/UL (ref 150–450)
PMV BLD AUTO: 9.7 FL (ref 10.8–14.1)
POTASSIUM SERPL-SCNC: 3.9 MMOL/L (ref 3.5–5.1)
PPD POC: NORMAL NEGATIVE
RBC # BLD AUTO: 3.1 M/UL (ref 4.05–5.25)
SODIUM SERPL-SCNC: 135 MMOL/L (ref 136–145)
SPECIMEN EXP DATE BLD: NORMAL
STATUS OF UNIT,%ST: NORMAL
UNIT DIVISION, %UDIV: 0
WBC # BLD AUTO: 7.5 K/UL (ref 4.3–11.1)

## 2018-01-29 PROCEDURE — 97530 THERAPEUTIC ACTIVITIES: CPT

## 2018-01-29 PROCEDURE — 74011250637 HC RX REV CODE- 250/637: Performed by: NURSE PRACTITIONER

## 2018-01-29 PROCEDURE — 74011250636 HC RX REV CODE- 250/636: Performed by: NURSE PRACTITIONER

## 2018-01-29 PROCEDURE — 85025 COMPLETE CBC W/AUTO DIFF WBC: CPT | Performed by: NURSE PRACTITIONER

## 2018-01-29 PROCEDURE — 97150 GROUP THERAPEUTIC PROCEDURES: CPT

## 2018-01-29 PROCEDURE — 80048 BASIC METABOLIC PNL TOTAL CA: CPT | Performed by: NURSE PRACTITIONER

## 2018-01-29 PROCEDURE — 77030019605

## 2018-01-29 PROCEDURE — 74011250637 HC RX REV CODE- 250/637: Performed by: FAMILY MEDICINE

## 2018-01-29 PROCEDURE — 65270000029 HC RM PRIVATE

## 2018-01-29 PROCEDURE — 36415 COLL VENOUS BLD VENIPUNCTURE: CPT | Performed by: NURSE PRACTITIONER

## 2018-01-29 RX ADMIN — ROSUVASTATIN CALCIUM 5 MG: 5 TABLET ORAL at 08:54

## 2018-01-29 RX ADMIN — OXYCODONE HYDROCHLORIDE 5 MG: 5 TABLET ORAL at 05:32

## 2018-01-29 RX ADMIN — DOCUSATE SODIUM 100 MG: 100 CAPSULE, LIQUID FILLED ORAL at 17:07

## 2018-01-29 RX ADMIN — DOCUSATE SODIUM 100 MG: 100 CAPSULE, LIQUID FILLED ORAL at 08:54

## 2018-01-29 RX ADMIN — OXYBUTYNIN CHLORIDE 5 MG: 5 TABLET ORAL at 08:54

## 2018-01-29 RX ADMIN — LISINOPRIL 10 MG: 5 TABLET ORAL at 08:53

## 2018-01-29 RX ADMIN — CALCIUM CARBONATE 500 MG (1,250 MG)-VITAMIN D3 200 UNIT TABLET 1 TABLET: at 08:53

## 2018-01-29 RX ADMIN — ACETAMINOPHEN 650 MG: 325 TABLET, FILM COATED ORAL at 21:08

## 2018-01-29 RX ADMIN — Medication 10 ML: at 21:09

## 2018-01-29 RX ADMIN — CALCIUM CARBONATE 500 MG (1,250 MG)-VITAMIN D3 200 UNIT TABLET 1 TABLET: at 17:07

## 2018-01-29 RX ADMIN — Medication 10 ML: at 05:33

## 2018-01-29 RX ADMIN — CALCIUM CARBONATE 500 MG (1,250 MG)-VITAMIN D3 200 UNIT TABLET 1 TABLET: at 12:07

## 2018-01-29 RX ADMIN — PANTOPRAZOLE SODIUM 40 MG: 40 TABLET, DELAYED RELEASE ORAL at 05:32

## 2018-01-29 RX ADMIN — ROPINIROLE HYDROCHLORIDE 5 MG: 2 TABLET, FILM COATED ORAL at 21:08

## 2018-01-29 RX ADMIN — DULOXETINE HYDROCHLORIDE 60 MG: 60 CAPSULE, DELAYED RELEASE ORAL at 21:08

## 2018-01-29 RX ADMIN — CARVEDILOL 3.12 MG: 3.12 TABLET, FILM COATED ORAL at 17:07

## 2018-01-29 RX ADMIN — CARVEDILOL 3.12 MG: 3.12 TABLET, FILM COATED ORAL at 08:54

## 2018-01-29 RX ADMIN — ENOXAPARIN SODIUM 30 MG: 30 INJECTION SUBCUTANEOUS at 08:54

## 2018-01-29 RX ADMIN — ACETAMINOPHEN 650 MG: 325 TABLET, FILM COATED ORAL at 05:32

## 2018-01-29 RX ADMIN — ACETAMINOPHEN 650 MG: 325 TABLET, FILM COATED ORAL at 13:56

## 2018-01-29 RX ADMIN — OXYBUTYNIN CHLORIDE 5 MG: 5 TABLET ORAL at 21:08

## 2018-01-29 NOTE — PROGRESS NOTES
Follow up call placed to admission for [facility], following pt, pending PT/OT notes to make determination for admission.

## 2018-01-29 NOTE — PROGRESS NOTES
Problem: Mobility Impaired (Adult and Pediatric)  Goal: *Acute Goals and Plan of Care (Insert Text)  STG:  (1.)Ms. Holly Jacobs will move from supine to sit and sit to supine , scoot up and down and roll side to side with MAXIMAL ASSIST within 3 day(s). (2.)Ms. Holly Jacobs will transfer from bed to chair and chair to bed with MAXIMAL ASSIST using the least restrictive device within 3 day(s). (3.)Ms. Holly Jacobs will participate in therapeutic activity/exerices x 12 minutes for increased strength within 3 days. (4.)Ms. Holly Jacobs will propel wheelchair 15 ft with STAND BY ASSIST for increased functional independence within 3 days. LTG:  (1.)Ms. Holly Jacobs will move from supine to sit and sit to supine , scoot up and down and roll side to side in bed with MODERATE ASSIST within 7 day(s). (2.)Ms. Holly Jacobs will transfer from bed to chair and chair to bed with MODERATE ASSIST using the least restrictive device within 7 day(s). (3.)Ms. Holly Jacobs will participate in therapeutic activity/exerices x 23 minutes for increased strength within 7 days. (4.)Ms. Holly Jacobs will propel wheelchair 50 ft with STAND BY ASSIST for increased functional independence within 7 days.   ________________________________________________________________________________________________      PHYSICAL THERAPY: Daily Note, Treatment Day: 3rd, PM 1/29/2018  INPATIENT: Hospital Day: 5  [place] / Product Type: Medicare /    R LE WBAT     NAME/AGE/GENDER: Myron Garcia is a 80 y.o. female   PRIMARY DIAGNOSIS: Closed right hip fracture (HCC)  right hip fracture Closed right hip fracture (HCC) Closed right hip fracture (HCC)  Procedure(s) (LRB):  FEMUR INSERTION INTRA MEDULLARY NAIL (Right)  3 Days Post-Op  ICD-10: Treatment Diagnosis:   · Generalized Muscle Weakness (M62.81)  · Difficulty in walking, Not elsewhere classified (R26.2)  · Other abnormalities of gait and mobility (R26.89)  · History of falling (Z91.81) Precaution/Allergies:  Tramadol; Morphine; Other medication; and Sulfa (sulfonamide antibiotics)      ASSESSMENT:     Ms. Desirae Randolph was sitting up in recliner chair upon contact and agreeable to PT. Patient able to transfer to standing with mod assist x 2 and cues for technique/hand placement. In standing patient unable to take steps to EOB needing max-total assist x 2 to complete transfer as her knees started to buckle. Patient returns to EOB and to supine with mod assist x 2. Patient found to be soiled and worked on rolling left and right. Instructed patient in proper technique of rolling which she was able to do with mod-max assist. Overall slow progress towards physical therapy goals. No goals have been met thus far. Will continue efforts. This section established at most recent assessment   PROBLEM LIST (Impairments causing functional limitations):  1. Decreased Strength  2. Decreased ADL/Functional Activities  3. Decreased Transfer Abilities  4. Decreased Ambulation Ability/Technique  5. Decreased Balance  6. Increased Pain  7. Decreased Activity Tolerance  8. Decreased Cognition   INTERVENTIONS PLANNED: (Benefits and precautions of physical therapy have been discussed with the patient.)  1. Balance Exercise  2. Bed Mobility  3. Family Education  4. Gait Training  5. Neuromuscular Re-education/Strengthening  6. Therapeutic Activites  7. Therapeutic Exercise/Strengthening  8. Transfer Training  9. Group Therapy     TREATMENT PLAN: Frequency/Duration: Twice times a week for duration of hospital stay  Rehabilitation Potential For Stated Goals: Guarded     RECOMMENDED REHABILITATION/EQUIPMENT: (at time of discharge pending progress): Due to the probability of continued deficits (see above) this patient will likely need continued skilled physical therapy after discharge.   Equipment:    None at this time              HISTORY:   History of Present Injury/Illness (Reason for Referral):  Per H&P: \"  HPI: Patient history was obtained from the ER provider prior to seeing the patient.     Patient is a 80 y.o. female who presents to the ER after having a fall at her nursing home. She has lewy body dementia and cannot provide history about what happened. Family is here and states that she has actually fallen a few times. She was recently moved into a memory care unit for closer observation. She has had pain in rt hip since fall, and unable to bear weight. ER workup shows a hip fx. No known LOC.        ROS:  All systems have been reviewed and are negative except as stated in HPI or elsewhere. \"  Past Medical History/Comorbidities:   Ms. Patrice Man  has a past medical history of Acute myocardial infarction of other anterior wall, initial episode of care [facility]) (8/30/2012); Acute systolic congestive heart failure- in setting of anterior MI, EF 35-40% (8/30/2012); Acute systolic heart failure ([address] ARF (acute renal failure) ([address] (9/29/2012); Arthritis; CAD (coronary artery disease); Carotid artery stenosis without cerebral infarction; Chronic pain- chronic narcotic use for spinal stenosis (8/30/2012); Coronary atherosclerosis of native coronary artery (3/16/2013); Diarrhea (10/2/2012); Dyslipidemia (8/30/2012); Dyspnea; Essential hypertension, benign (8/30/2012); Heart failure ([address] Hyperlipidemia; Hypertension; Ischemic colitis ([address] Lower GI bleed (3/12/2015); Myocardial infarction, anterolateral wall, subsequent care ([address] N&V (nausea and vomiting) (3/16/2013); Sepsis ([address] (2/12/2014); Spinal stenosis (8/30/2012); ST elevation (STEMI) myocardial infarction involving left anterior descending coronary artery ([address] and Syncope and collapse (2/12/2014). She also has no past medical history of Unspecified sleep apnea.   Ms. Patrice Man  has a past surgical history that includes hx cholecystectomy; hx gyn; hx appendectomy; hx orthopaedic; hx other surgical; hx coronary stent placement; and pr cardiac surg procedure unlist.  Social History/Living Environment:   Home Environment: [address] [place] [address] Name: Jhon   One/Two Story Residence: One story  Living Alone: No  Support Systems: Family member(s), Skilled nursing facility  Patient Expects to be Discharged to[de-identified] Skilled nursing facility  Current DME Used/Available at Home: Wheelchair  Prior Level of Function/Work/Activity:  Lives in memory care facility. Gets assistance for ADLs and is wheelchair bound. Recent falls reported. Number of Personal Factors/Comorbidities that affect the Plan of Care:  Dementia  Falls 3+: HIGH COMPLEXITY   EXAMINATION:   Most Recent Physical Functioning:   Gross Assessment:                  Posture:     Balance:  Sitting: Impaired  Sitting - Static: Fair (occasional)  Sitting - Dynamic: Fair (occasional)  Standing: Impaired  Standing - Static: Poor  Standing - Dynamic : Poor Bed Mobility:  Rolling: Moderate assistance;Maximum assistance  Supine to Sit: Additional time;Maximum assistance  Sit to Supine: Assist x2;Maximum assistance  Wheelchair Mobility:     Transfers:  Sit to Stand: Moderate assistance;Assist x2  Stand to Sit: Moderate assistance;Assist x2  Bed to Chair: Maximum assistance;Assist x2  Gait:            Body Structures Involved:  1. Bones  2. Joints  3. Muscles Body Functions Affected:  1. Sensory/Pain  2. Neuromusculoskeletal  3. Movement Related Activities and Participation Affected:  1. Learning and Applying Knowledge  2. General Tasks and Demands  3. Communication  4. Mobility  5. Self Care  6. Domestic Life  7. Interpersonal Interactions and Relationships  8.  Community, Social and [place]   Number of elements that affect the Plan of Care: 4+: HIGH COMPLEXITY   CLINICAL PRESENTATION:   Presentation: Evolving clinical presentation with changing clinical characteristics: MODERATE COMPLEXITY   CLINICAL DECISION MAKIN [facility] Mobility Inpatient Short Form  How much difficulty does the patient currently have. .. Unable A Lot A Little None   1. Turning over in bed (including adjusting bedclothes, sheets and blankets)? [] 1   [x] 2   [] 3   [] 4   2. Sitting down on and standing up from a chair with arms ( e.g., wheelchair, bedside commode, etc.)   [x] 1   [] 2   [] 3   [] 4   3. Moving from lying on back to sitting on the side of the bed? [x] 1   [] 2   [] 3   [] 4   How much help from another person does the patient currently need. .. Total A Lot A Little None   4. Moving to and from a bed to a chair (including a wheelchair)? [x] 1   [] 2   [] 3   [] 4   5. Need to walk in hospital room? [x] 1   [] 2   [] 3   [] 4   6. Climbing 3-5 steps with a railing? [x] 1   [] 2   [] 3   [] 4   © 2007, Trustees of [address] [place] [address], under license to [employer]. All rights reserved      Score:  Initial: 7 Most Recent: X (Date: -- )    Interpretation of Tool:  Represents activities that are increasingly more difficult (i.e. Bed mobility, Transfers, Gait). Score 24 23 22-20 19-15 14-10 9-7 6     Modifier CH CI CJ CK CL CM CN      ? Mobility - Walking and Moving Around:     - CURRENT STATUS: CM - 80%-99% impaired, limited or restricted    - GOAL STATUS: CL - 60%-79% impaired, limited or restricted    - D/C STATUS:  ---------------To be determined---------------  [place] / Product Type: Medicare /      Medical Necessity:     · Patient demonstrates GUARDED rehab potential due to higher previous functional level. Reason for Services/Other Comments:  · Patient continues to require skilled intervention due to decreased functional mobility.    Use of outcome tool(s) and clinical judgement create a POC that gives a: Questionable prediction of patient's progress: MODERATE COMPLEXITY            TREATMENT:   (In addition to Assessment/Re-Assessment sessions the following treatments were rendered)   Pre-treatment Symptoms/Complaints: None  Pain: Initial:   Pain Intensity 1: 0  Post Session:  0/10     Therapeutic Activity: (    15 Minutes): Therapeutic activities including bed mobility training, transfer training, posture training, scooting, static/dynamic sitting/standing balance activities, attempted ambulation on level ground, and patient education to improve mobility, strength, balance and coordination. Required maximal verbal, tactile and manual cues   to promote static and dynamic balance in standing and promote coordination of bilateral, lower extremity(s). Group Therapeutic Exercise: (   Minutes):  Exercises per grid below to improve mobility, strength, balance and stiffness/soreness. Required moderate visual, verbal, manual and tactile cues to promote proper body alignment, promote proper body posture and promote proper body mechanics. Progressed range, repetitions and complexity of movement as indicated. Date:  1/29/18 Date:   Date:     ACTIVITY/EXERCISE AM PM AM PM AM PM   Ambulation:           Distance  Device  Duration         Seated Heel Raises x15B A        Seated Toe Raises x15B A        Seated Long Arc Quads x15B A        Seated Marching x15B  AA-R, A-L        Seated Hip Abduction x15B  AA-R, A-L                 B = bilateral; AA = active assistive; A = active; P = passive          Braces/Orthotics/Lines/Etc:   · IV  · O2 Device: Nasal cannula  Treatment/Session Assessment:    · Response to Treatment: see above  · Interdisciplinary Collaboration:   o Physical Therapy Assistant  o Registered Nurse  o Rehabilitation Attendant  · After treatment position/precautions:   o Supine in bed  o Bed alarm/tab alert on  o Bed/Chair-wheels locked  o Bed in low position  o Call light within reach  o RN notified   · Compliance with Program/Exercises: Will assess as treatment progresses. · Recommendations/Intent for next treatment session:   \"Next visit will focus on advancements to more challenging activities and reduction in assistance provided\".   Total Treatment Duration:  PT Patient Time In/Time Out  Time In: 1307  Time Out: Peyman 1978, PTA

## 2018-01-29 NOTE — PROGRESS NOTES
Problem: Self Care Deficits Care Plan (Adult)  Goal: *Acute Goals and Plan of Care (Insert Text)  1. Azucena Welsh will complete self-feeding with minimal assistance or less. [address]  will complete grooming/oral care with moderate assistance or less. [address] will complete upper body bathing/dressing with moderate assistance or less. [address] will complete toilet/bedside commode transfers with minimal assistance or less. Time frame: 4 - 7 visits    OCCUPATIONAL THERAPY: Daily Note, Treatment Day: 2nd and AM    1/29/2018  INPATIENT: Hospital Day: 5  [place] / Product Type: Medicare /      NAME/AGE/GENDER: Azucena Welsh is a 80 y.o. female   PRIMARY DIAGNOSIS:  Closed right hip fracture (HCC)  right hip fracture Closed right hip fracture (HCC) Closed right hip fracture (HCC)  Procedure(s) (LRB):  FEMUR INSERTION INTRA MEDULLARY NAIL (Right)  3 Days Post-Op  ICD-10: Treatment Diagnosis:    · Generalized Muscle Weakness (M62.81)  · Other lack of cordination (R27.8)  · History of falling (Z91.81)   Precautions/Allergies:    WBAT RLE; Tramadol; Morphine; Other medication; and Sulfa (sulfonamide antibiotics)      ASSESSMENT:     Ms. Ramos Franco presents s/p R hip fracture s/p ORIF and is typically a resident of [facility] SN[facility] with some function per friend. Ms. Ramos Franco presents with confusion requiring maximal assistance to feed herself. 1/29/2018 Pt was transferred down to the gym in her recliner chair to participate in group exercises to increase strength for mobility and ADL's. Pt required visual, verbal, and manual cues to complete exercises with proper form. Pt participated with good effort and will continue to benefit from OT. Continue OT POC. This section established at most recent assessment   PROBLEM LIST (Impairments causing functional limitations):  1. Decreased Strength  2.  Decreased ADL/Functional Activities  3. Decreased Transfer Abilities  4. Decreased Balance  5. Increased Pain  6. Decreased Activity Tolerance  7. Decreased Flexibility/Joint Mobility  8. Decreased Cognition   INTERVENTIONS PLANNED: (Benefits and precautions of occupational therapy have been discussed with the patient.)  1. Activities of daily living training  2. Cognitive training  3. Therapeutic activity  4. Therapeutic exercise     TREATMENT PLAN: Frequency/Duration: Follow patient 6 times/week to address above goals. Rehabilitation Potential For Stated Goals: Good     RECOMMENDED REHABILITATION/EQUIPMENT: (at time of discharge pending progress): Due to the probability of continued deficits (see above) this patient will likely need continued skilled occupational therapy after discharge. Equipment:    None at this time              OCCUPATIONAL PROFILE AND HISTORY:   History of Present Injury/Illness (Reason for Referral):  See H & P. Past Medical History/Comorbidities:   Ms. Starlyn Duverney  has a past medical history of Acute myocardial infarction of other anterior wall, initial episode of care [facility]) (8/30/2012); Acute systolic congestive heart failure- in setting of anterior MI, EF 35-40% (8/30/2012); Acute systolic heart failure ([address] ARF (acute renal failure) ([address] (9/29/2012); Arthritis; CAD (coronary artery disease); Carotid artery stenosis without cerebral infarction; Chronic pain- chronic narcotic use for spinal stenosis (8/30/2012); Coronary atherosclerosis of native coronary artery (3/16/2013); Diarrhea (10/2/2012); Dyslipidemia (8/30/2012); Dyspnea; Essential hypertension, benign (8/30/2012); Heart failure ([address] Hyperlipidemia; Hypertension; Ischemic colitis ([address] Lower GI bleed (3/12/2015); Myocardial infarction, anterolateral wall, subsequent care ([address] N&V (nausea and vomiting) (3/16/2013); Sepsis ([address] (2/12/2014);  Spinal stenosis (8/30/2012); ST elevation (STEMI) myocardial infarction involving left anterior descending coronary artery ([facility] and Syncope and collapse (2/12/2014). She also has no past medical history of Unspecified sleep apnea. Ms. Veronica Payton  has a past surgical history that includes hx cholecystectomy; hx gyn; hx appendectomy; hx orthopaedic; hx other surgical; hx coronary stent placement; and pr cardiac surg procedure unlist.  Social History/Living Environment:   Home Environment: [address] [place] [address] Name: Jhno   One/Two [place] Residence: One story  Living Alone: No  Support Systems: Family member(s), Skilled nursing facility  Patient Expects to be Discharged to[de-identified] Skilled nursing facility  Current DME Used/Available at Home: Wheelchair  Prior Level of Function/Work/Activity:  Some function per visiting friend. Able to propel w/c and feed herself. Left alone for periods of time. Living at SNF for at least a year. Number of Personal Factors/Comorbidities that affect the Plan of Care: Expanded review of therapy/medical records (1-2):  MODERATE COMPLEXITY   ASSESSMENT OF OCCUPATIONAL PERFORMANCE[de-identified]   Activities of Daily Living:           Basic ADLs (From Assessment) Complex ADLs (From Assessment)   Basic ADL  Feeding: Maximum assistance  Oral Facial Hygiene/Grooming: Maximum assistance  Bathing: Total assistance  Upper Body Dressing: Total assistance  Lower Body Dressing: Total assistance  Toileting: Total assistance     Grooming/Bathing/Dressing Activities of Daily Living                             Bed/Mat Mobility  Supine to Sit: Additional time;Maximum assistance  Sit to Stand:  Moderate assistance;Assist x2  Bed to Chair: Maximum assistance;Assist x2       Most Recent Physical Functioning:   Gross Assessment:                  Posture:  Posture (WDL): Exceptions to WDL  Posture Assessment: Scoliosis right, Increased  Balance:  Sitting: Impaired  Sitting - Static: Fair (occasional)  Sitting - Dynamic: Fair (occasional)  Standing: Impaired  Standing - Static: Poor  Standing - Dynamic : Poor Bed Mobility:  Supine to Sit: Additional time;Maximum assistance  Wheelchair Mobility:     Transfers:  Sit to Stand: Moderate assistance;Assist x2  Stand to Sit: Moderate assistance;Assist x2  Bed to Chair: Maximum assistance;Assist x2                Patient Vitals for the past 6 hrs:   BP BP Patient Position SpO2 Pulse   18 0750 131/71 At rest 96 % 74   18 1204 147/81 Sitting 99 % 74       Mental Status  Neurologic State: Confused, Alert  Orientation Level: Oriented to person, Disoriented to place, Disoriented to situation, Disoriented to time  Cognition: Follows commands, Decreased attention/concentration  Perception: Cues to maintain midline in standing  Perseveration: No perseveration noted  Safety/Judgement: Fall prevention                          Physical Skills Involved:  1. Range of Motion  2. Balance  3. Strength  4. Activity Tolerance  5. Pain (acute) Cognitive Skills Affected (resulting in the inability to perform in a timely and safe manner):  1. Perception  2. Executive Function  3. Immediate Memory  4. Short Term Recall  5. Long Term Memory  6. Sustained Attention  7. Divided Attention Psychosocial Skills Affected:  1. Habits/Routines  2. Environmental Adaptation  3. Social Interaction  4. Self-Awareness  5. Awareness of Others  6. Social Roles   Number of elements that affect the Plan of Care: 3-5:  MODERATE COMPLEXITY   CLINICAL DECISION MAKIN [facility] [address] [id number] AM-PAC 6 Clicks   Daily Activity Inpatient Short Form  How much help from another person does the patient currently need. .. Total A Lot A Little None   1. Putting on and taking off regular lower body clothing? [x] 1   [] 2   [] 3   [] 4   2. Bathing (including washing, rinsing, drying)? [x] 1   [] 2   [] 3   [] 4   3. Toileting, which includes using toilet, bedpan or urinal?   [x] 1   [] 2   [] 3   [] 4   4. Putting on and taking off regular upper body clothing? [x] 1   [] 2   [] 3   [] 4   5.   Taking care of personal grooming such as brushing teeth? [x] 1   [] 2   [] 3   [] 4   6. Eating meals? [] 1   [x] 2   [] 3   [] 4   © 2007, Trustees of [address] [place] [address] [address] [address], under license to [employer]. All rights reserved      Score:  Initial: 7 Most Recent: X (Date: -- )    Interpretation of Tool:  Represents activities that are increasingly more difficult (i.e. Bed mobility, Transfers, Gait). Score 24 23 22-20 19-15 14-10 9-7 6     Modifier CH CI CJ CK CL CM CN      ? Self Care:     - CURRENT STATUS: CM - 80%-99% impaired, limited or restricted    - GOAL STATUS: CK - 40%-59% impaired, limited or restricted    - D/C STATUS:  ---------------To be determined---------------  [place] / Product Type: Medicare /      Medical Necessity:     · Patient demonstrates fair rehab potential due to higher previous functional level. Reason for Services/Other Comments:  · Patient continues to require skilled intervention due to decreased independence with ADL and functional mobility for ADL. Use of outcome tool(s) and clinical judgement create a POC that gives a: MODERATE COMPLEXITY         TREATMENT:   (In addition to Assessment/Re-Assessment sessions the following treatments were rendered)     Pre-treatment Symptoms/Complaints:    Pain: Initial:   Pain Intensity 1: 0  Post Session:  same     Group Therapeutic Exercise: (10 minutes):  Exercises per grid below to improve mobility and strength. Required minimal visual and verbal cues to promote proper body mechanics. Progressed range and repetitions as indicated.    Date:  1/29/18 Date:   Date:     Activity/Exercise Parameters Parameters Parameters   Shoulder flexion/extension 15 reps with yellow theraband     Shoulder horizontal add/abb 15 reps with yellow theraband     Punches 15 reps with yellow theraband     Elbow flexion/extension 15 reps with yellow theraband     Tricep extension      Balloon tapping      Ball toss Braces/Orthotics/Lines/Etc:   · IV  · O2 Device: Nasal cannula  Treatment/Session Assessment:    Response to Treatment:  Tolerated treatment well without complications. · Interdisciplinary Collaboration:   o Certified Occupational Therapy Assistant  o Registered Nurse  · After treatment position/precautions:   o Up in chair  o RN notified  o back to room by [employer]   · Compliance with Program/Exercises: Will assess as treatment progresses. · Recommendations/Intent for next treatment session: \"Next visit will focus on advancements to more challenging activities\".   Total Treatment Duration:  OT Patient Time In/Time Out  Time In: 1145  Time Out: 3530 [address] Tanvi Best

## 2018-01-29 NOTE — CDMP QUERY
Please clarify if this patient is being treated/managed for:    ACUTE BLOOD LOSS ANEMIA in the setting of 87yo s/p surgery with HGB 10.4-> 7.7, EBL 100ml being managed with transfusion of PRBCs and serial HGB monitoring. =>Other Explanation of clinical findings  =>Unable to Determine (no explanation of clinical findings)    The medical record reflects the following:    Risk Factors:  87yo , s/p surgery    Clinical Indicators: HGB 10.4-> 7.7, EBL 100ml     Treatment: Transfusion PRBCs, serial HGB monitoring    Please clarify and document your clinical opinion in the progress notes and discharge summary including the definitive and/or presumptive diagnosis, (suspected or probable), related to the above clinical findings. Please include clinical findings supporting your diagnosis.     Thank you,  Arianna Mejia RN  [phone]

## 2018-01-29 NOTE — PROGRESS NOTES
Problem: Falls - Risk of  Goal: *Absence of Falls  Document Cali Fall Risk and appropriate interventions in the flowsheet. Outcome: Progressing Towards Goal  Fall Risk Interventions:  Mobility Interventions: Bed/chair exit alarm, Communicate number of staff needed for ambulation/transfer, Patient to call before getting OOB    Mentation Interventions: Adequate sleep, hydration, pain control, Bed/chair exit alarm, Evaluate medications/consider consulting pharmacy    Medication Interventions: Assess postural VS orthostatic hypotension, Bed/chair exit alarm, Patient to call before getting OOB    Elimination Interventions: Bed/chair exit alarm, Call light in reach, Patient to call for help with toileting needs    History of Falls Interventions: Bed/chair exit alarm, Door open when patient unattended, Investigate reason for fall        Problem: Pain  Goal: *Control of Pain  Outcome: Progressing Towards Goal  Using meds and assessment to keep pain at a tolerable level.

## 2018-01-29 NOTE — PROGRESS NOTES
Problem: Mobility Impaired (Adult and Pediatric)  Goal: *Acute Goals and Plan of Care (Insert Text)  STG:  (1.)Ms. Hermelindo Cooper will move from supine to sit and sit to supine , scoot up and down and roll side to side with MAXIMAL ASSIST within 3 day(s). (2.)Ms. Hermelindo Cooper will transfer from bed to chair and chair to bed with MAXIMAL ASSIST using the least restrictive device within 3 day(s). (3.)Ms. Hermelindo Cooper will participate in therapeutic activity/exerices x 12 minutes for increased strength within 3 days. (4.)Ms. Hermelindo Cooper will propel wheelchair 15 ft with STAND BY ASSIST for increased functional independence within 3 days. LTG:  (1.)Ms. Hermelindo Cooper will move from supine to sit and sit to supine , scoot up and down and roll side to side in bed with MODERATE ASSIST within 7 day(s). (2.)Ms. Hermelindo Cooper will transfer from bed to chair and chair to bed with MODERATE ASSIST using the least restrictive device within 7 day(s). (3.)Ms. Hermelindo Cooper will participate in therapeutic activity/exerices x 23 minutes for increased strength within 7 days. (4.)Ms. Hermelindo Cooper will propel wheelchair 50 ft with STAND BY ASSIST for increased functional independence within 7 days.   ________________________________________________________________________________________________      PHYSICAL THERAPY: Daily Note, Treatment Day: 3rd, AM 1/29/2018  INPATIENT: Hospital Day: 5  [place] / Product Type: Medicare /    R SUNDAY WBAT     NAME/AGE/GENDER: Hina Cortés is a 80 y.o. female   PRIMARY DIAGNOSIS: Closed right hip fracture (HCC)  right hip fracture Closed right hip fracture (HCC) Closed right hip fracture (HCC)  Procedure(s) (LRB):  FEMUR INSERTION INTRA MEDULLARY NAIL (Right)  3 Days Post-Op  ICD-10: Treatment Diagnosis:   · Generalized Muscle Weakness (M62.81)  · Difficulty in walking, Not elsewhere classified (R26.2)  · Other abnormalities of gait and mobility (R26.89)  · History of falling (Z91.81) Precaution/Allergies:  Tramadol; Morphine; Other medication; and Sulfa (sulfonamide antibiotics)      ASSESSMENT:     Ms. Lakesha Mccurdy was supine upon contact and agreeable to PT with encouragement. Patient able to perform supine to sit with max assist, additional time, and cues for improved technique. Once seated EOB patient able to transfer to standing with mod assist x 2 and cues for technique/hand placement. In standing patient unable to take steps to recliner chair needing max-total assist x 2 to complete transfer as her knees started to buckle. Patient was later rolled to therapy gym to participate in group therapeutic strengthening exercises to improve functional strength for transfers, gait and overall mobility. Patient requires cues and assistance to perform exercises correctly. Overall slow progress towards physical therapy goals. No goals have been met thus far. Will continue efforts. This section established at most recent assessment   PROBLEM LIST (Impairments causing functional limitations):  1. Decreased Strength  2. Decreased ADL/Functional Activities  3. Decreased Transfer Abilities  4. Decreased Ambulation Ability/Technique  5. Decreased Balance  6. Increased Pain  7. Decreased Activity Tolerance  8. Decreased Cognition   INTERVENTIONS PLANNED: (Benefits and precautions of physical therapy have been discussed with the patient.)  1. Balance Exercise  2. Bed Mobility  3. Family Education  4. Gait Training  5. Neuromuscular Re-education/Strengthening  6. Therapeutic Activites  7. Therapeutic Exercise/Strengthening  8. Transfer Training  9.  Group Therapy     TREATMENT PLAN: Frequency/Duration: Twice times a week for duration of hospital stay  Rehabilitation Potential For Stated Goals: Guarded     RECOMMENDED REHABILITATION/EQUIPMENT: (at time of discharge pending progress): Due to the probability of continued deficits (see above) this patient will likely need continued skilled physical therapy after discharge. Equipment:    None at this time              HISTORY:   History of Present Injury/Illness (Reason for Referral):  Per H&P: \"  HPI:   Patient history was obtained from the ER provider prior to seeing the patient.     Patient is a 80 y.o. female who presents to the ER after having a fall at her nursing home. She has lewy body dementia and cannot provide history about what happened. Family is here and states that she has actually fallen a few times. She was recently moved into a memory care unit for closer observation. She has had pain in rt hip since fall, and unable to bear weight. ER workup shows a hip fx. No known LOC.        ROS:  All systems have been reviewed and are negative except as stated in HPI or elsewhere. \"  Past Medical History/Comorbidities:   Ms. Dino Jara  has a past medical history of Acute myocardial infarction of other anterior wall, initial episode of care [facility]) (8/30/2012); Acute systolic congestive heart failure- in setting of anterior MI, EF 35-40% (8/30/2012); Acute systolic heart failure ([address] ARF (acute renal failure) ([address] (9/29/2012); Arthritis; CAD (coronary artery disease); Carotid artery stenosis without cerebral infarction; Chronic pain- chronic narcotic use for spinal stenosis (8/30/2012); Coronary atherosclerosis of native coronary artery (3/16/2013); Diarrhea (10/2/2012); Dyslipidemia (8/30/2012); Dyspnea; Essential hypertension, benign (8/30/2012); Heart failure ([address] Hyperlipidemia; Hypertension; Ischemic colitis ([address] Lower GI bleed (3/12/2015); Myocardial infarction, anterolateral wall, subsequent care ([address] N&V (nausea and vomiting) (3/16/2013); Sepsis ([address] (2/12/2014); Spinal stenosis (8/30/2012); ST elevation (STEMI) myocardial infarction involving left anterior descending coronary artery ([address] and Syncope and collapse (2/12/2014). She also has no past medical history of Unspecified sleep apnea.   Ms. Dino Jara  has a past surgical history that includes hx cholecystectomy; hx gyn; hx appendectomy; hx orthopaedic; hx other surgical; hx coronary stent placement; and pr cardiac surg procedure unlist.  Social History/Living Environment:   Home Environment: [address] [place] [address] Name: [facility]   One/Two Story Residence: One story  Living Alone: No  Support Systems: Family member(s), Skilled nursing facility  Patient Expects to be Discharged to[de-identified] Skilled nursing facility  Current DME Used/Available at Home: Wheelchair  Prior Level of Function/Work/Activity:  Lives in memory care facility. Gets assistance for ADLs and is wheelchair bound. Recent falls reported. Number of Personal Factors/Comorbidities that affect the Plan of Care:  Dementia  Falls 3+: HIGH COMPLEXITY   EXAMINATION:   Most Recent Physical Functioning:   Gross Assessment:                  Posture:     Balance:  Sitting: Impaired  Sitting - Static: Fair (occasional)  Sitting - Dynamic: Fair (occasional)  Standing: Impaired  Standing - Static: Poor  Standing - Dynamic : Poor Bed Mobility:  Supine to Sit: Additional time;Maximum assistance  Wheelchair Mobility:     Transfers:  Sit to Stand: Moderate assistance;Assist x2  Stand to Sit: Moderate assistance;Assist x2  Bed to Chair: Maximum assistance;Assist x2  Gait:            Body Structures Involved:  1. Bones  2. Joints  3. Muscles Body Functions Affected:  1. Sensory/Pain  2. Neuromusculoskeletal  3. Movement Related Activities and Participation Affected:  1. Learning and Applying Knowledge  2. General Tasks and Demands  3. Communication  4. Mobility  5. Self Care  6. Domestic Life  7. Interpersonal Interactions and Relationships  8.  Community, Social and [place]   Number of elements that affect the Plan of Care: 4+: HIGH COMPLEXITY   CLINICAL PRESENTATION:   Presentation: Evolving clinical presentation with changing clinical characteristics: MODERATE COMPLEXITY   CLINICAL DECISION MAKIN [facility] [address] [id number] AM-PAC 5 Clicks   Basic Mobility Inpatient Short Form  How much difficulty does the patient currently have. .. Unable A Lot A Little None   1. Turning over in bed (including adjusting bedclothes, sheets and blankets)? [] 1   [x] 2   [] 3   [] 4   2. Sitting down on and standing up from a chair with arms ( e.g., wheelchair, bedside commode, etc.)   [x] 1   [] 2   [] 3   [] 4   3. Moving from lying on back to sitting on the side of the bed? [x] 1   [] 2   [] 3   [] 4   How much help from another person does the patient currently need. .. Total A Lot A Little None   4. Moving to and from a bed to a chair (including a wheelchair)? [x] 1   [] 2   [] 3   [] 4   5. Need to walk in hospital room? [x] 1   [] 2   [] 3   [] 4   6. Climbing 3-5 steps with a railing? [x] 1   [] 2   [] 3   [] 4   © 2007, Trustees of [address] [place] [address], under license to [employer]. All rights reserved      Score:  Initial: 7 Most Recent: X (Date: -- )    Interpretation of Tool:  Represents activities that are increasingly more difficult (i.e. Bed mobility, Transfers, Gait). Score 24 23 22-20 19-15 14-10 9-7 6     Modifier CH CI CJ CK CL CM CN      ? Mobility - Walking and Moving Around:     - CURRENT STATUS: CM - 80%-99% impaired, limited or restricted    - GOAL STATUS: CL - 60%-79% impaired, limited or restricted    - D/C STATUS:  ---------------To be determined---------------  [place] / Product Type: Medicare /      Medical Necessity:     · Patient demonstrates GUARDED rehab potential due to higher previous functional level. Reason for Services/Other Comments:  · Patient continues to require skilled intervention due to decreased functional mobility.    Use of outcome tool(s) and clinical judgement create a POC that gives a: Questionable prediction of patient's progress: MODERATE COMPLEXITY            TREATMENT:   (In addition to Assessment/Re-Assessment sessions the following treatments were rendered)   Pre-treatment Symptoms/Complaints: None  Pain: Initial:   Pain Intensity 1: 0  Post Session:  0/10     Therapeutic Activity: (    15 Minutes): Therapeutic activities including bed mobility training, transfer training, posture training, scooting, static/dynamic sitting/standingf balance activities, attempted ambulation on level ground, and patient education to improve mobility, strength, balance and coordination. Required maximal verbal, tactile and manual cues   to promote static and dynamic balance in standing and promote coordination of bilateral, lower extremity(s). Group Therapeutic Exercise: (  10 Minutes):  Exercises per grid below to improve mobility, strength, balance and stiffness/soreness. Required moderate visual, verbal, manual and tactile cues to promote proper body alignment, promote proper body posture and promote proper body mechanics. Progressed range, repetitions and complexity of movement as indicated. Date:  1/29/18 Date:   Date:     ACTIVITY/EXERCISE AM PM AM PM AM PM   Ambulation:           Distance  Device  Duration         Seated Heel Raises x15B A        Seated Toe Raises x15B A        Seated Long Arc Quads x15B A        Seated Marching x15B  AA-R, A-L        Seated Hip Abduction x15B  AA-R, A-L                 B = bilateral; AA = active assistive; A = active; P = passive          Braces/Orthotics/Lines/Etc:   · IV  · O2 Device: Nasal cannula  Treatment/Session Assessment:    · Response to Treatment: see above  · Interdisciplinary Collaboration:   o Physical Therapy Assistant  o Registered Nurse  o Rehabilitation Attendant  · After treatment position/precautions:   o Up in chair  o Bed alarm/tab alert on  o Bed/Chair-wheels locked  o Call light within reach  o RN notified   · Compliance with Program/Exercises: Will assess as treatment progresses. · Recommendations/Intent for next treatment session:   \"Next visit will focus on advancements to more challenging activities and reduction in assistance provided\".   Total Treatment Duration:  PT Patient Time In/Time Out  Time In: 0903 (1135)  Time Out: 0918 (1145)    Dylan Kelly PTA

## 2018-01-29 NOTE — PROGRESS NOTES
Checked to see if patient was safe.     Katy Melton, staff [address] [facility]  /   [email or web address]

## 2018-01-29 NOTE — PROGRESS NOTES
Hospitalist Progress Note     Admit Date:  2018  6:56 PM   Name:  Emerita Allen   Age:  80 y.o.  :  1932   MRN:  [id number]   PCP:  Polly Velazquez MD  Treatment Team: Attending Provider: Caridad Valdovinos MD; Utilization Review: Roselyn Stout; Care Manager: Yury Fisher RN    Subjective: The patient denies any significant complaints, except for some mild pain in the right hip. Her Hb was 9.3 this morning. Objective:   Patient Vitals for the past 24 hrs:   Temp Pulse Resp BP SpO2   18 1503 97.9 °F (36.6 °C) 86 16 115/71 98 %   18 1204 97.9 °F (36.6 °C) 74 16 147/81 99 %   18 0750 98.5 °F (36.9 °C) 74 16 131/71 96 %   18 0433 98 °F (36.7 °C) 74 16 149/76 97 %   18 2300 99 °F (37.2 °C) 100 16 120/68 94 %   18 1951 99.1 °F (37.3 °C) 67 16 117/67 94 %     Oxygen Therapy  O2 Sat (%): 98 % (18 1503)  Pulse via Oximetry: 73 beats per minute (18 0800)  O2 Device: Room air (18 1601)  O2 Flow Rate (L/min): 2 l/min (decreased from 3) (18 0800)  No intake or output data in the 24 hours ending 18 1711      General:    Well nourished. Alert. CV:   RRR. No murmur, rub, or gallop. Lungs:   Clear to auscultation bilaterally. No wheezing, rhonchi, or rales. Abdomen:   Soft, nontender, nondistended. Extremities: Warm and dry. No cyanosis or edema. Skin:     No rashes or jaundice. Data Review:  I have reviewed all labs, meds, telemetry events, and studies from the last 24 hours.     Recent Results (from the past 24 hour(s))   PLEASE READ & DOCUMENT PPD TEST IN 72 HRS    Collection Time: 18 12:30 AM   Result Value Ref Range    PPD negatvie Negative    mm Induration 0 mm   CBC WITH AUTOMATED DIFF    Collection Time: 18  6:08 AM   Result Value Ref Range    WBC 7.5 4.3 - 11.1 K/uL    RBC 3.10 (L) 4.05 - 5.25 M/uL    HGB 9.3 (L) 11.7 - 15.4 g/dL    HCT 27.4 (L) 35.8 - 46.3 %    MCV 88.4 79.6 - 97.8 FL    MCH 30.0 26.1 - 32.9 PG    MCHC 33.9 31.4 - 35.0 g/dL    RDW 14.3 11.9 - 14.6 %    PLATELET 175 130 - 752 K/uL    MPV 9.7 (L) 10.8 - 14.1 FL    DF AUTOMATED      NEUTROPHILS 72 43 - 78 %    LYMPHOCYTES 17 13 - 44 %    MONOCYTES 9 4.0 - 12.0 %    EOSINOPHILS 1 0.5 - 7.8 %    BASOPHILS 0 0.0 - 2.0 %    IMMATURE GRANULOCYTES 1 0.0 - 5.0 %    ABS. NEUTROPHILS 5.4 1.7 - 8.2 K/UL    ABS. LYMPHOCYTES 1.3 0.5 - 4.6 K/UL    ABS. MONOCYTES 0.7 0.1 - 1.3 K/UL    ABS. EOSINOPHILS 0.1 0.0 - 0.8 K/UL    ABS. BASOPHILS 0.0 0.0 - 0.2 K/UL    ABS. IMM. GRANS. 0.0 0.0 - 0.5 K/UL   METABOLIC PANEL, BASIC    Collection Time: 01/29/18  6:08 AM   Result Value Ref Range    Sodium 135 (L) 136 - 145 mmol/L    Potassium 3.9 3.5 - 5.1 mmol/L    Chloride 99 98 - 107 mmol/L    CO2 28 21 - 32 mmol/L    Anion gap 8 7 - 16 mmol/L    Glucose 84 65 - 100 mg/dL    BUN 24 (H) 8 - 23 MG/DL    Creatinine 0.64 0.6 - 1.0 MG/DL    GFR est AA >60 >60 ml/min/1.73m2    GFR est non-AA >60 >60 ml/min/1.73m2    Calcium 9.1 8.3 - 10.4 MG/DL        All Micro Results     Procedure Component Value Units Date/Time    MSSA/MRSA SC BY PCR, NASAL SWAB [[id number]] Collected:  01/26/18 0035    Order Status:  Completed Specimen:  Swab Updated:  01/26/18 0414     Special Requests: NO SPECIAL REQUESTS        Culture result:         SA target not detected. A MRSA NEGATIVE, SA NEGATIVE test result does not preclude MRSA or SA nasal colonization.     MSSA/MRSA SC BY PCR, NASAL SWAB [[id number]]     Order Status:  Canceled Specimen:  Swab           Current Meds:  Current Facility-Administered Medications   Medication Dose Route Frequency    0.9% sodium chloride infusion 250 mL  250 mL IntraVENous PRN    HYDROmorphone (PF) (DILAUDID) injection 0.5 mg  0.5 mg IntraVENous Q4H PRN    sodium chloride (NS) flush 5-10 mL  5-10 mL IntraVENous Q8H    sodium chloride (NS) flush 5-10 mL  5-10 mL IntraVENous PRN    acetaminophen (TYLENOL) tablet 650 mg  650 mg Oral Q8H    oxyCODONE IR (ROXICODONE) tablet 5 mg  5 mg Oral Q4H PRN    ondansetron (ZOFRAN) injection 4 mg  4 mg IntraVENous Q4H PRN    docusate sodium (COLACE) capsule 100 mg  100 mg Oral BID    alum-mag hydroxide-simeth (MYLANTA) oral suspension 30 mL  30 mL Oral Q4H PRN    calcium-vitamin D (OS-YEIMY) 500 mg-200 unit tablet  1 Tab Oral TID WITH MEALS    enoxaparin (LOVENOX) injection 30 mg  30 mg SubCUTAneous Q24H    carvedilol (COREG) tablet 3.125 mg  3.125 mg Oral BID WITH MEALS    DULoxetine (CYMBALTA) capsule 60 mg  60 mg Oral QHS    pantoprazole (PROTONIX) tablet 40 mg  40 mg Oral ACB    lisinopril (PRINIVIL, ZESTRIL) tablet 10 mg  10 mg Oral DAILY    oxybutynin (DITROPAN) tablet 5 mg  5 mg Oral BID    rOPINIRole (REQUIP) tablet 5 mg  5 mg Oral QHS    rosuvastatin (CRESTOR) tablet 5 mg  5 mg Oral Q MON, WED & FRI    hydrALAZINE (APRESOLINE) 20 mg/mL injection 10 mg  10 mg IntraVENous Q6H PRN       Other Studies (last 24 hours):  No results found.     Assessment and Plan:     Hospital Problems as of 1/29/2018  Date Reviewed: 1/26/2018          Codes Class Noted - Resolved POA    * (Principal)Closed right hip fracture ([facility] ICD-10-CM: S72.001A  ICD-9-CM: 820.8  1/25/2018 - Present Yes        DNR (do not resuscitate) (Chronic) ICD-10-CM: Z66  ICD-9-CM: V49.86  2/18/2017 - Present Yes        Lewy body dementia (Chronic) ICD-10-CM: G31.83, F02.80  ICD-9-CM: 331.82  2/17/2017 - Present Yes        CKD (chronic kidney disease) stage 3, GFR 30-59 ml/min (Chronic) ICD-10-CM: N18.3  ICD-9-CM: 585.3  2/17/2017 - Present Yes        Chronic diastolic congestive heart failure (HCC) (Chronic) ICD-10-CM: I50.32  ICD-9-CM: 428.32, 428.0  2/17/2017 - Present Yes        Essential hypertension, benign (Chronic) ICD-10-CM: I10  ICD-9-CM: 401.1  8/30/2012 - Present Yes        Chronic pain- chronic narcotic use for spinal stenosis (Chronic) ICD-10-CM: Q36.23  ICD-9-CM: 338.29  8/30/2012 - Present Yes              1 - Right Intertrochanteric Hip Fracture. The patient had hip fracture repair 3 days ago. She seems to be doing well. Will likely need SNF for Rehab at discharge. Continue Dilaudid and Oxycodone prn for pain control. 2 - Postoperative Anemia, improved. Hb 9.3 today. The patient is s/p transfusion of 3 PRBCs. Monitor H/H. Goal Hb at least 8.0 because of h/o CAD. 3 - CAD s/p stent, stable. Continue home medications. 4 - HTN, controlled. Continue home antihypertensive medications. 5 - Dyslipidemia, stable. Continue statins. 6 - Lewy body dementia, stable. Continue supportive treatment. 7 - DVT Prophylaxis with Lovenox. 8 - Disposition: possible d/c to SNF for rehab within the next 24 hours if accepted by a rehab facility. Signed:   Daniel Bhatti MD

## 2018-01-30 PROBLEM — D62 ACUTE BLOOD LOSS ANEMIA: Status: ACTIVE | Noted: 2018-01-30

## 2018-01-30 LAB
ANION GAP SERPL CALC-SCNC: 10 MMOL/L (ref 7–16)
BUN SERPL-MCNC: 24 MG/DL (ref 8–23)
CALCIUM SERPL-MCNC: 8.9 MG/DL (ref 8.3–10.4)
CHLORIDE SERPL-SCNC: 98 MMOL/L (ref 98–107)
CO2 SERPL-SCNC: 26 MMOL/L (ref 21–32)
CREAT SERPL-MCNC: 0.68 MG/DL (ref 0.6–1)
ERYTHROCYTE [DISTWIDTH] IN BLOOD BY AUTOMATED COUNT: 13.8 % (ref 11.9–14.6)
GLUCOSE SERPL-MCNC: 92 MG/DL (ref 65–100)
HCT VFR BLD AUTO: 26.2 % (ref 35.8–46.3)
HGB BLD-MCNC: 8.8 G/DL (ref 11.7–15.4)
MCH RBC QN AUTO: 29.7 PG (ref 26.1–32.9)
MCHC RBC AUTO-ENTMCNC: 33.6 G/DL (ref 31.4–35)
MCV RBC AUTO: 88.5 FL (ref 79.6–97.8)
PLATELET # BLD AUTO: 227 K/UL (ref 150–450)
PMV BLD AUTO: 9.4 FL (ref 10.8–14.1)
POTASSIUM SERPL-SCNC: 3.7 MMOL/L (ref 3.5–5.1)
RBC # BLD AUTO: 2.96 M/UL (ref 4.05–5.25)
SODIUM SERPL-SCNC: 134 MMOL/L (ref 136–145)
WBC # BLD AUTO: 7 K/UL (ref 4.3–11.1)

## 2018-01-30 PROCEDURE — 65270000029 HC RM PRIVATE

## 2018-01-30 PROCEDURE — 77030013131 HC IV BLD ST ICUM -A

## 2018-01-30 PROCEDURE — 86923 COMPATIBILITY TEST ELECTRIC: CPT | Performed by: NURSE PRACTITIONER

## 2018-01-30 PROCEDURE — 97150 GROUP THERAPEUTIC PROCEDURES: CPT

## 2018-01-30 PROCEDURE — 85027 COMPLETE CBC AUTOMATED: CPT | Performed by: INTERNAL MEDICINE

## 2018-01-30 PROCEDURE — 36415 COLL VENOUS BLD VENIPUNCTURE: CPT | Performed by: INTERNAL MEDICINE

## 2018-01-30 PROCEDURE — 97110 THERAPEUTIC EXERCISES: CPT

## 2018-01-30 PROCEDURE — P9016 RBC LEUKOCYTES REDUCED: HCPCS | Performed by: NURSE PRACTITIONER

## 2018-01-30 PROCEDURE — 36430 TRANSFUSION BLD/BLD COMPNT: CPT

## 2018-01-30 PROCEDURE — 80048 BASIC METABOLIC PNL TOTAL CA: CPT | Performed by: INTERNAL MEDICINE

## 2018-01-30 PROCEDURE — 97530 THERAPEUTIC ACTIVITIES: CPT

## 2018-01-30 PROCEDURE — 86900 BLOOD TYPING SEROLOGIC ABO: CPT | Performed by: NURSE PRACTITIONER

## 2018-01-30 PROCEDURE — 74011250636 HC RX REV CODE- 250/636: Performed by: NURSE PRACTITIONER

## 2018-01-30 PROCEDURE — 74011250637 HC RX REV CODE- 250/637: Performed by: FAMILY MEDICINE

## 2018-01-30 PROCEDURE — 74011250637 HC RX REV CODE- 250/637: Performed by: NURSE PRACTITIONER

## 2018-01-30 RX ORDER — SODIUM CHLORIDE 9 MG/ML
250 INJECTION, SOLUTION INTRAVENOUS AS NEEDED
Status: DISCONTINUED | OUTPATIENT
Start: 2018-01-30 | End: 2018-01-31 | Stop reason: HOSPADM

## 2018-01-30 RX ADMIN — LISINOPRIL 10 MG: 5 TABLET ORAL at 09:30

## 2018-01-30 RX ADMIN — PANTOPRAZOLE SODIUM 40 MG: 40 TABLET, DELAYED RELEASE ORAL at 05:55

## 2018-01-30 RX ADMIN — CALCIUM CARBONATE 500 MG (1,250 MG)-VITAMIN D3 200 UNIT TABLET 1 TABLET: at 09:30

## 2018-01-30 RX ADMIN — CALCIUM CARBONATE 500 MG (1,250 MG)-VITAMIN D3 200 UNIT TABLET 1 TABLET: at 13:32

## 2018-01-30 RX ADMIN — CARVEDILOL 3.12 MG: 3.12 TABLET, FILM COATED ORAL at 17:27

## 2018-01-30 RX ADMIN — ROPINIROLE HYDROCHLORIDE 5 MG: 2 TABLET, FILM COATED ORAL at 21:43

## 2018-01-30 RX ADMIN — ACETAMINOPHEN 650 MG: 325 TABLET, FILM COATED ORAL at 13:31

## 2018-01-30 RX ADMIN — Medication 10 ML: at 21:43

## 2018-01-30 RX ADMIN — ACETAMINOPHEN 650 MG: 325 TABLET, FILM COATED ORAL at 21:43

## 2018-01-30 RX ADMIN — DOCUSATE SODIUM 100 MG: 100 CAPSULE, LIQUID FILLED ORAL at 09:30

## 2018-01-30 RX ADMIN — CARVEDILOL 3.12 MG: 3.12 TABLET, FILM COATED ORAL at 09:30

## 2018-01-30 RX ADMIN — DOCUSATE SODIUM 100 MG: 100 CAPSULE, LIQUID FILLED ORAL at 17:27

## 2018-01-30 RX ADMIN — ENOXAPARIN SODIUM 30 MG: 30 INJECTION SUBCUTANEOUS at 09:31

## 2018-01-30 RX ADMIN — DULOXETINE HYDROCHLORIDE 60 MG: 60 CAPSULE, DELAYED RELEASE ORAL at 21:43

## 2018-01-30 RX ADMIN — OXYBUTYNIN CHLORIDE 5 MG: 5 TABLET ORAL at 09:30

## 2018-01-30 RX ADMIN — Medication 10 ML: at 05:57

## 2018-01-30 RX ADMIN — CALCIUM CARBONATE 500 MG (1,250 MG)-VITAMIN D3 200 UNIT TABLET 1 TABLET: at 17:27

## 2018-01-30 RX ADMIN — OXYBUTYNIN CHLORIDE 5 MG: 5 TABLET ORAL at 21:43

## 2018-01-30 RX ADMIN — ACETAMINOPHEN 650 MG: 325 TABLET, FILM COATED ORAL at 05:55

## 2018-01-30 NOTE — PROGRESS NOTES
PT notified primary RN that patient had removed dressing. Patient's incision sites were open to air with staples still intact. Gauze and tegaderm reapplied to incision sites.

## 2018-01-30 NOTE — PROGRESS NOTES
Problem: Falls - Risk of  Goal: *Absence of Falls  Document Cali Fall Risk and appropriate interventions in the flowsheet.    Outcome: Progressing Towards Goal  Fall Risk Interventions:  Mobility Interventions: Bed/chair exit alarm, Communicate number of staff needed for ambulation/transfer, OT consult for ADLs, Patient to call before getting OOB, PT Consult for mobility concerns, PT Consult for assist device competence, Strengthening exercises (ROM-active/passive)    Mentation Interventions: Adequate sleep, hydration, pain control, Bed/chair exit alarm, Door open when patient unattended, Eyeglasses and hearing aids, Increase mobility, More frequent rounding, Reorient patient, Room close to nurse's station, Toileting rounds, Update white board    Medication Interventions: Assess postural VS orthostatic hypotension, Bed/chair exit alarm, Evaluate medications/consider consulting pharmacy, Patient to call before getting OOB, Teach patient to arise slowly    Elimination Interventions: Bed/chair exit alarm, Call light in reach, Patient to call for help with toileting needs, Toilet paper/wipes in reach, Toileting schedule/hourly rounds    History of Falls Interventions: Bed/chair exit alarm, Consult care management for discharge planning, Door open when patient unattended, Evaluate medications/consider consulting pharmacy, Room close to nurse's station

## 2018-01-30 NOTE — PROGRESS NOTES
Hospitalist Progress Note    Subjective:   Daily Progress Note: 1/30/2018 2:50 PM    Ms. Michael Tobar is an 81 yo WF, with a pmh of Lewy Body Dementia, who presented 1/25 after a ground level fall at [facility] where she sustained a right femoral hip fracture for which she is POD 4. She has received three units PRBCs for acute blood loss anemia with hg down to 7.7. It is again down to 8.8 today and she is being transfused another unit. She is medically stable for discharge but we are awaiting precert from insurance. No family currently at bedside.      Current Facility-Administered Medications   Medication Dose Route Frequency    0.9% sodium chloride infusion 250 mL  250 mL IntraVENous PRN    0.9% sodium chloride infusion 250 mL  250 mL IntraVENous PRN    HYDROmorphone (PF) (DILAUDID) injection 0.5 mg  0.5 mg IntraVENous Q4H PRN    sodium chloride (NS) flush 5-10 mL  5-10 mL IntraVENous Q8H    sodium chloride (NS) flush 5-10 mL  5-10 mL IntraVENous PRN    acetaminophen (TYLENOL) tablet 650 mg  650 mg Oral Q8H    oxyCODONE IR (ROXICODONE) tablet 5 mg  5 mg Oral Q4H PRN    ondansetron (ZOFRAN) injection 4 mg  4 mg IntraVENous Q4H PRN    docusate sodium (COLACE) capsule 100 mg  100 mg Oral BID    alum-mag hydroxide-simeth (MYLANTA) oral suspension 30 mL  30 mL Oral Q4H PRN    calcium-vitamin D (OS-YEIMY) 500 mg-200 unit tablet  1 Tab Oral TID WITH MEALS    enoxaparin (LOVENOX) injection 30 mg  30 mg SubCUTAneous Q24H    carvedilol (COREG) tablet 3.125 mg  3.125 mg Oral BID WITH MEALS    DULoxetine (CYMBALTA) capsule 60 mg  60 mg Oral QHS    pantoprazole (PROTONIX) tablet 40 mg  40 mg Oral ACB    lisinopril (PRINIVIL, ZESTRIL) tablet 10 mg  10 mg Oral DAILY    oxybutynin (DITROPAN) tablet 5 mg  5 mg Oral BID    rOPINIRole (REQUIP) tablet 5 mg  5 mg Oral QHS    rosuvastatin (CRESTOR) tablet 5 mg  5 mg Oral Q MON, WED & FRI    hydrALAZINE (APRESOLINE) 20 mg/mL injection 10 mg  10 mg IntraVENous Q6H PRN Review of Systems  Review of systems not obtained due to patient factors.     Objective:     Visit Vitals    /76 (BP 1 Location: Right arm, BP Patient Position: At rest)    Pulse 71    Temp 98.3 °F (36.8 °C)    Resp 16    Ht 4' 8\" (1.422 m)    Wt 54.9 kg (121 lb)    SpO2 95%    BMI 27.13 kg/m2    O2 Flow Rate (L/min): 2 l/min (decreased from 3) O2 Device: Room air    Temp (24hrs), Av.1 °F (37.3 °C), Min:97.9 °F (36.6 °C), Max:100.6 °F (38.1 °C)         1901 -  0700  In: 250 [P.O.:250]  Out: -     General: awake, disoriented  Eyes; non icteric  Neck; supple  CV: RRR  Pulm; non labored, normal effort    Additional comments:I reviewed the patient's new clinical lab test results. j    Data Review    Recent Results (from the past 24 hour(s))   CBC W/O DIFF    Collection Time: 18  6:31 AM   Result Value Ref Range    WBC 7.0 4.3 - 11.1 K/uL    RBC 2.96 (L) 4.05 - 5.25 M/uL    HGB 8.8 (L) 11.7 - 15.4 g/dL    HCT 26.2 (L) 35.8 - 46.3 %    MCV 88.5 79.6 - 97.8 FL    MCH 29.7 26.1 - 32.9 PG    MCHC 33.6 31.4 - 35.0 g/dL    RDW 13.8 11.9 - 14.6 %    PLATELET 502 226 - 724 K/uL    MPV 9.4 (L) 10.8 - 82.1 FL   METABOLIC PANEL, BASIC    Collection Time: 18  6:31 AM   Result Value Ref Range    Sodium 134 (L) 136 - 145 mmol/L    Potassium 3.7 3.5 - 5.1 mmol/L    Chloride 98 98 - 107 mmol/L    CO2 26 21 - 32 mmol/L    Anion gap 10 7 - 16 mmol/L    Glucose 92 65 - 100 mg/dL    BUN 24 (H) 8 - 23 MG/DL    Creatinine 0.68 0.6 - 1.0 MG/DL    GFR est AA >60 >60 ml/min/1.73m2    GFR est non-AA >60 >60 ml/min/1.73m2    Calcium 8.9 8.3 - 10.4 MG/DL   TYPE + CROSSMATCH    Collection Time: 18  8:12 AM   Result Value Ref Range    Crossmatch Expiration 2018     ABO/Rh(D) Radha Timmons POSITIVE     Antibody screen NEG     Unit number [id number]     Blood component type RC LR     Unit division 00     Status of unit ALLOCATED     Crossmatch result Compatible          Assessment/Plan:     Principal Problem:    Closed right hip fracture (HCC) (1/25/2018)    Active Problems:    Essential hypertension, benign (8/30/2012)      Chronic pain- chronic narcotic use for spinal stenosis (8/30/2012)      Lewy body dementia (2/17/2017)      CKD (chronic kidney disease) stage 3, GFR 30-59 ml/min (2/17/2017)      Chronic diastolic congestive heart failure ([facility] [address] (2/17/2017)      DNR (do not resuscitate) (2/18/2017)      Acute blood loss anemia (1/30/2018)    continue PT, OT.    Transfusing one unit PRBCs  Stable for DC once insurance precert obtained    Care Plan discussed with: Patient/Family and     Signed By: Chetan Miranda MD     January 30, 2018

## 2018-01-30 NOTE — PROGRESS NOTES
Problem: Self Care Deficits Care Plan (Adult)  Goal: *Acute Goals and Plan of Care (Insert Text)  1. Jaun Rae will complete self-feeding with minimal assistance or less. [address]  will complete grooming/oral care with moderate assistance or less. [address] will complete upper body bathing/dressing with moderate assistance or less. [address] will complete toilet/bedside commode transfers with minimal assistance or less. Time frame: 4 - 7 visits    OCCUPATIONAL THERAPY: Daily Note, Treatment Day: 3rd and PM    1/30/2018  INPATIENT: Hospital Day: 6  [place] / [place] / Product Type: Medicare /      NAME/AGE/GENDER: Jaun Rae is a 80 y.o. female   PRIMARY DIAGNOSIS:  Closed right hip fracture (HCC)  right hip fracture Closed right hip fracture (HCC) Closed right hip fracture (HCC)  Procedure(s) (LRB):  FEMUR INSERTION INTRA MEDULLARY NAIL (Right)  4 Days Post-Op  ICD-10: Treatment Diagnosis:    · Generalized Muscle Weakness (M62.81)  · Other lack of cordination (R27.8)  · History of falling (Z91.81)   Precautions/Allergies:    WBAT RLE; Tramadol; Morphine; Other medication; and Sulfa (sulfonamide antibiotics)      ASSESSMENT:     Ms. Jenni Moyer presents s/p R hip fracture s/p ORIF and is typically a resident of [facility] with some function per friend. Ms. Jenni Moyer presents with confusion requiring maximal assistance to feed herself. 1/30/2018 Pt was presented in supine upon arrival, alert and confused. Pt completed supine to sit transfer with moderate assist and additional time. Pt sat edge of bed for approximately 15 minutes to increase activity tolerance and sitting balance. Pt easily distracted making it difficult to attempt functional tasks and focus on maintaining balance. Pt completed sit to stand 3 reps with a rolling walker and minimal/moderate assist x's 2. Pt not able to maintain for any length of time.  Pt was assisted back to supine with moderate assist x's 2 with posey activated and belongings in reach. Not much progress made due to cognition and distractibility. Continue OT POC. This section established at most recent assessment   PROBLEM LIST (Impairments causing functional limitations):  1. Decreased Strength  2. Decreased ADL/Functional Activities  3. Decreased Transfer Abilities  4. Decreased Balance  5. Increased Pain  6. Decreased Activity Tolerance  7. Decreased Flexibility/Joint Mobility  8. Decreased Cognition   INTERVENTIONS PLANNED: (Benefits and precautions of occupational therapy have been discussed with the patient.)  1. Activities of daily living training  2. Cognitive training  3. Therapeutic activity  4. Therapeutic exercise     TREATMENT PLAN: Frequency/Duration: Follow patient 6 times/week to address above goals. Rehabilitation Potential For Stated Goals: Good     RECOMMENDED REHABILITATION/EQUIPMENT: (at time of discharge pending progress): Due to the probability of continued deficits (see above) this patient will likely need continued skilled occupational therapy after discharge. Equipment:    None at this time              OCCUPATIONAL PROFILE AND HISTORY:   History of Present Injury/Illness (Reason for Referral):  See H & P. Past Medical History/Comorbidities:   Ms. Starlyn Duverney  has a past medical history of Acute myocardial infarction of other anterior wall, initial episode of care [facility]) (8/30/2012); Acute systolic congestive heart failure- in setting of anterior MI, EF 35-40% (8/30/2012); Acute systolic heart failure ([facility] [address] ARF (acute renal failure) ([facility] [address] (9/29/2012); Arthritis; CAD (coronary artery disease); Carotid artery stenosis without cerebral infarction; Chronic pain- chronic narcotic use for spinal stenosis (8/30/2012); Coronary atherosclerosis of native coronary artery (3/16/2013); Diarrhea (10/2/2012); Dyslipidemia (8/30/2012); Dyspnea; Essential hypertension, benign (8/30/2012);  Heart failure ([facility] [address] Hyperlipidemia; Hypertension; Ischemic colitis ([facility] [address] Lower GI bleed (3/12/2015); Myocardial infarction, anterolateral wall, subsequent care ([facility] [address] N&V (nausea and vomiting) (3/16/2013); Sepsis ([facility] [address] (2/12/2014); Spinal stenosis (8/30/2012); ST elevation (STEMI) myocardial infarction involving left anterior descending coronary artery ([facility] [address] and Syncope and collapse (2/12/2014). She also has no past medical history of Unspecified sleep apnea. Ms. Cesar aRngel  has a past surgical history that includes hx cholecystectomy; hx gyn; hx appendectomy; hx orthopaedic; hx other surgical; hx coronary stent placement; and pr cardiac surg procedure unlist.  Social History/Living Environment:   Home Environment: [address] [place] [address] Name: Jhon Nolan/Sina [place] Residence: One story  Living Alone: No  Support Systems: Family member(s), Skilled nursing facility  Patient Expects to be Discharged to[de-identified] Skilled nursing facility  Current DME Used/Available at Home: Wheelchair  Prior Level of Function/Work/Activity:  Some function per visiting friend. Able to propel w/c and feed herself. Left alone for periods of time. Living at SNF for at least a year. Number of Personal Factors/Comorbidities that affect the Plan of Care: Expanded review of therapy/medical records (1-2):  MODERATE COMPLEXITY   ASSESSMENT OF OCCUPATIONAL PERFORMANCE[de-identified]   Activities of Daily Living:           Basic ADLs (From Assessment) Complex ADLs (From Assessment)   Basic ADL  Feeding: Maximum assistance  Oral Facial Hygiene/Grooming: Maximum assistance  Bathing: Total assistance  Upper Body Dressing: Total assistance  Lower Body Dressing: Total assistance  Toileting: Total assistance     Grooming/Bathing/Dressing Activities of Daily Living                             Bed/Mat Mobility  Rolling: Moderate assistance  Supine to Sit: Moderate assistance;Assist x2  Sit to Supine:  Moderate assistance  Sit to Stand: Minimum assistance;Assist x2  Bed to Chair: Maximum assistance;Assist x2       Most Recent Physical Functioning:   Gross Assessment:                  Posture:  Posture (WDL): Exceptions to WDL  Posture Assessment: Scoliosis right, Increased  Balance:  Sitting: Impaired  Sitting - Static: Fair (occasional)  Sitting - Dynamic: Fair (occasional)  Standing: Impaired  Standing - Static: Poor  Standing - Dynamic : Poor Bed Mobility:  Rolling: Moderate assistance  Supine to Sit: Moderate assistance;Assist x2  Sit to Supine: Moderate assistance  Wheelchair Mobility:     Transfers:  Sit to Stand: Minimum assistance;Assist x2  Stand to Sit: Minimum assistance  Bed to Chair: Maximum assistance;Assist x2                Patient Vitals for the past 6 hrs:   BP BP Patient Position SpO2 Pulse   18 1116 131/76 At rest 95 % 71   18 1451 112/53 At rest 99 % 71       Mental Status  Neurologic State: Alert, Confused  Orientation Level: Disoriented to time  Cognition: Follows commands  Perception: Cues to maintain midline in standing  Perseveration: No perseveration noted  Safety/Judgement: Fall prevention                          Physical Skills Involved:  1. Range of Motion  2. Balance  3. Strength  4. Activity Tolerance  5. Pain (acute) Cognitive Skills Affected (resulting in the inability to perform in a timely and safe manner):  1. Perception  2. Executive Function  3. Immediate Memory  4. Short Term Recall  5. Long Term Memory  6. Sustained Attention  7. Divided Attention Psychosocial Skills Affected:  1. Habits/Routines  2. Environmental Adaptation  3. Social Interaction  4. Self-Awareness  5. Awareness of Others  6. Social Roles   Number of elements that affect the Plan of Care: 3-5:  MODERATE COMPLEXITY   CLINICAL DECISION MAKIN [facility] [address] [id number] AM-PAC 6 Clicks   Daily Activity Inpatient Short Form  How much help from another person does the patient currently need. .. Total A Lot A Little None   1.   Putting on and taking off regular lower body clothing? [x] 1   [] 2   [] 3   [] 4   2. Bathing (including washing, rinsing, drying)? [x] 1   [] 2   [] 3   [] 4   3. Toileting, which includes using toilet, bedpan or urinal?   [x] 1   [] 2   [] 3   [] 4   4. Putting on and taking off regular upper body clothing? [x] 1   [] 2   [] 3   [] 4   5. Taking care of personal grooming such as brushing teeth? [x] 1   [] 2   [] 3   [] 4   6. Eating meals? [] 1   [x] 2   [] 3   [] 4   © 2007, Trustees of [address] [place] [address] [address] [id number], under license to [employer]. All rights reserved      Score:  Initial: 7 Most Recent: X (Date: -- )    Interpretation of Tool:  Represents activities that are increasingly more difficult (i.e. Bed mobility, Transfers, Gait). Score 24 23 22-20 19-15 14-10 9-7 6     Modifier CH CI CJ CK CL CM CN      ? Self Care:     - CURRENT STATUS: CM - 80%-99% impaired, limited or restricted    - GOAL STATUS: CK - 40%-59% impaired, limited or restricted    - D/C STATUS:  ---------------To be determined---------------  [place] / [place] / Product Type: Medicare /      Medical Necessity:     · Patient demonstrates fair rehab potential due to higher previous functional level. Reason for Services/Other Comments:  · Patient continues to require skilled intervention due to decreased independence with ADL and functional mobility for ADL. Use of outcome tool(s) and clinical judgement create a POC that gives a: MODERATE COMPLEXITY         TREATMENT:   (In addition to Assessment/Re-Assessment sessions the following treatments were rendered)     Pre-treatment Symptoms/Complaints:    Pain: Initial:   Pain Intensity 1: 0  Post Session:  same     Therapeutic Activity: (23 minutes): Therapeutic activities including Bed transfers, edge of bed balance and static/dynamic standing  to improve mobility, strength and balance. Required moderate assist to promote static balance in standing. Braces/Orthotics/Lines/Etc:   · IV  · O2 Device: Nasal cannula  Treatment/Session Assessment:    Response to Treatment:  Tolerated treatment well without complications. · Interdisciplinary Collaboration:   o Certified Occupational Therapy Assistant  o Registered Nurse  · After treatment position/precautions:   o Supine in bed  o Bed alarm/tab alert on  o Bed/Chair-wheels locked  o Bed in low position  o Call light within reach  o RN notified  o Side rails x 3   · Compliance with Program/Exercises: Will assess as treatment progresses. · Recommendations/Intent for next treatment session: \"Next visit will focus on advancements to more challenging activities\".   Total Treatment Duration:  OT Patient Time In/Time Out  Time In: 1450  Time Out: [address] Theresa King

## 2018-01-30 NOTE — PROGRESS NOTES
Problem: Mobility Impaired (Adult and Pediatric)  Goal: *Acute Goals and Plan of Care (Insert Text)  STG:  (1.)Ms. Palomo Early will move from supine to sit and sit to supine , scoot up and down and roll side to side with MAXIMAL ASSIST within 3 day(s). (2.)Ms. Palomo Early will transfer from bed to chair and chair to bed with MAXIMAL ASSIST using the least restrictive device within 3 day(s). (3.)Ms. Palomo Early will participate in therapeutic activity/exerices x 12 minutes for increased strength within 3 days. (4.)Ms. Palomo Early will propel wheelchair 15 ft with STAND BY ASSIST for increased functional independence within 3 days. LTG:  (1.)Ms. Palomo Early will move from supine to sit and sit to supine , scoot up and down and roll side to side in bed with MODERATE ASSIST within 7 day(s). (2.)Ms. Palomo Early will transfer from bed to chair and chair to bed with MODERATE ASSIST using the least restrictive device within 7 day(s). (3.)Ms. Palomo Early will participate in therapeutic activity/exerices x 23 minutes for increased strength within 7 days. (4.)Ms. Palomo Early will propel wheelchair 50 ft with STAND BY ASSIST for increased functional independence within 7 days.   ______________________________________________________________________________________________    PHYSICAL THERAPY: Daily Note, Treatment Day: 4th, AM 1/30/2018  INPATIENT: Hospital Day: 6  [place] / Product Type: Medicare /    R SUNDAY WBBRENDA     NAME/AGE/GENDER: Pilar Wills is a 80 y.o. female   PRIMARY DIAGNOSIS: Closed right hip fracture (HCC)  right hip fracture Closed right hip fracture (HCC) Closed right hip fracture (HCC)  Procedure(s) (LRB):  FEMUR INSERTION INTRA MEDULLARY NAIL (Right)  4 Days Post-Op  ICD-10: Treatment Diagnosis:   · Generalized Muscle Weakness (M62.81)  · Difficulty in walking, Not elsewhere classified (R26.2)  · Other abnormalities of gait and mobility (R26.89)  · History of falling (Z91.81) Precaution/Allergies:  Tramadol; Morphine; Other medication; and Sulfa (sulfonamide antibiotics)      ASSESSMENT:     Ms. Dino Jara was supine upon contact and agreeable to PT. Patient able to perform supine to sit with max assist, additional time, and cues for improved technique. Once seated EOB patient able to transfer to standing with mod assist x 2 and cues for technique/hand placement. In standing patient unable to take steps to recliner chair needing max-total assist x 2 to complete transfer as her knees started to buckle. Patient was later rolled to therapy gym to participate in group therapeutic strengthening exercises to improve functional strength for transfers, gait and overall mobility. Patient requires cues and assistance to perform exercises correctly. Overall slow progress towards physical therapy goals. No goals have been met thus far. Will continue efforts.           This section established at most recent assessment   PROBLEM LIST (Impairments causing functional limitations):  1. Decreased Strength  2. Decreased ADL/Functional Activities  3. Decreased Transfer Abilities  4. Decreased Ambulation Ability/Technique  5. Decreased Balance  6. Increased Pain  7. Decreased Activity Tolerance  8. Decreased Cognition   INTERVENTIONS PLANNED: (Benefits and precautions of physical therapy have been discussed with the patient.)  1. Balance Exercise  2. Bed Mobility  3. Family Education  4. Gait Training  5. Neuromuscular Re-education/Strengthening  6. Therapeutic Activites  7. Therapeutic Exercise/Strengthening  8. Transfer Training  9.  Group Therapy     TREATMENT PLAN: Frequency/Duration: Twice times a week for duration of hospital stay  Rehabilitation Potential For Stated Goals: Guarded     RECOMMENDED REHABILITATION/EQUIPMENT: (at time of discharge pending progress): Due to the probability of continued deficits (see above) this patient will likely need continued skilled physical therapy after discharge. Equipment:    None at this time              HISTORY:   History of Present Injury/Illness (Reason for Referral):  Per H&P: \"  HPI:   Patient history was obtained from the ER provider prior to seeing the patient.     Patient is a 80 y.o. female who presents to the ER after having a fall at her nursing home. She has lewy body dementia and cannot provide history about what happened. Family is here and states that she has actually fallen a few times. She was recently moved into a memory care unit for closer observation. She has had pain in rt hip since fall, and unable to bear weight. ER workup shows a hip fx. No known LOC.        ROS:  All systems have been reviewed and are negative except as stated in HPI or elsewhere. \"  Past Medical History/Comorbidities:   Ms. Starlyn Duverney  has a past medical history of Acute myocardial infarction of other anterior wall, initial episode of care [facility]) (8/30/2012); Acute systolic congestive heart failure- in setting of anterior MI, EF 35-40% (8/30/2012); Acute systolic heart failure ([address] ARF (acute renal failure) ([address] (9/29/2012); Arthritis; CAD (coronary artery disease); Carotid artery stenosis without cerebral infarction; Chronic pain- chronic narcotic use for spinal stenosis (8/30/2012); Coronary atherosclerosis of native coronary artery (3/16/2013); Diarrhea (10/2/2012); Dyslipidemia (8/30/2012); Dyspnea; Essential hypertension, benign (8/30/2012); Heart failure ([address] Hyperlipidemia; Hypertension; Ischemic colitis ([address] Lower GI bleed (3/12/2015); Myocardial infarction, anterolateral wall, subsequent care ([address] N&V (nausea and vomiting) (3/16/2013); Sepsis ([address] (2/12/2014); Spinal stenosis (8/30/2012); ST elevation (STEMI) myocardial infarction involving left anterior descending coronary artery ([address] and Syncope and collapse (2/12/2014). She also has no past medical history of Unspecified sleep apnea.   Ms. Starlyn Duverney  has a past surgical history that includes hx cholecystectomy; hx gyn; hx appendectomy; hx orthopaedic; hx other surgical; hx coronary stent placement; and pr cardiac surg procedure unlist.  Social History/Living Environment:   Home Environment: [address] [place] [address] Name: [facility]   One/Two Story Residence: One story  Living Alone: No  Support Systems: Family member(s), Skilled nursing facility  Patient Expects to be Discharged to[de-identified] Skilled nursing facility  Current DME Used/Available at Home: Wheelchair  Prior Level of Function/Work/Activity:  Lives in memory care facility. Gets assistance for ADLs and is wheelchair bound. Recent falls reported. Number of Personal Factors/Comorbidities that affect the Plan of Care:  Dementia  Falls 3+: HIGH COMPLEXITY   EXAMINATION:   Most Recent Physical Functioning:   Gross Assessment:                  Posture:     Balance:  Sitting: Impaired  Sitting - Static: Fair (occasional)  Sitting - Dynamic: Poor (constant support)  Standing: Impaired  Standing - Static: Poor  Standing - Dynamic : Poor Bed Mobility:  Supine to Sit: Maximum assistance; Additional time  Wheelchair Mobility:     Transfers:  Sit to Stand: Moderate assistance;Assist x2  Stand to Sit: Moderate assistance;Assist x2  Bed to Chair: Maximum assistance;Assist x2  Gait:            Body Structures Involved:  1. Bones  2. Joints  3. Muscles Body Functions Affected:  1. Sensory/Pain  2. Neuromusculoskeletal  3. Movement Related Activities and Participation Affected:  1. Learning and Applying Knowledge  2. General Tasks and Demands  3. Communication  4. Mobility  5. Self Care  6. Domestic Life  7. Interpersonal Interactions and Relationships  8.  Community, Social and [place]   Number of elements that affect the Plan of Care: 4+: HIGH COMPLEXITY   CLINICAL PRESENTATION:   Presentation: Evolving clinical presentation with changing clinical characteristics: MODERATE COMPLEXITY   CLINICAL DECISION MAKIN [facility] [address] [id number] AM-PAC 8 Clicks   Basic Mobility Inpatient Short Form  How much difficulty does the patient currently have. .. Unable A Lot A Little None   1. Turning over in bed (including adjusting bedclothes, sheets and blankets)? [] 1   [x] 2   [] 3   [] 4   2. Sitting down on and standing up from a chair with arms ( e.g., wheelchair, bedside commode, etc.)   [x] 1   [] 2   [] 3   [] 4   3. Moving from lying on back to sitting on the side of the bed? [x] 1   [] 2   [] 3   [] 4   How much help from another person does the patient currently need. .. Total A Lot A Little None   4. Moving to and from a bed to a chair (including a wheelchair)? [x] 1   [] 2   [] 3   [] 4   5. Need to walk in hospital room? [x] 1   [] 2   [] 3   [] 4   6. Climbing 3-5 steps with a railing? [x] 1   [] 2   [] 3   [] 4   © 2007, Trustees of [facility], under license to [employer]. All rights reserved      Score:  Initial: 7 Most Recent: X (Date: -- )    Interpretation of Tool:  Represents activities that are increasingly more difficult (i.e. Bed mobility, Transfers, Gait). Score 24 23 22-20 19-15 14-10 9-7 6     Modifier CH CI CJ CK CL CM CN      ? Mobility - Walking and Moving Around:     - CURRENT STATUS: CM - 80%-99% impaired, limited or restricted    - GOAL STATUS: CL - 60%-79% impaired, limited or restricted    - D/C STATUS:  ---------------To be determined---------------  [place] / Product Type: Medicare /      Medical Necessity:     · Patient demonstrates GUARDED rehab potential due to higher previous functional level. Reason for Services/Other Comments:  · Patient continues to require skilled intervention due to decreased functional mobility.    Use of outcome tool(s) and clinical judgement create a POC that gives a: Questionable prediction of patient's progress: MODERATE COMPLEXITY            TREATMENT:   (In addition to Assessment/Re-Assessment sessions the following treatments were rendered)   Pre-treatment Symptoms/Complaints: None  Pain: Initial:   Pain Intensity 1: 0  Post Session:  0/10     Therapeutic Activity: (    15 Minutes): Therapeutic activities including bed mobility training, transfer training, posture training, scooting, static/dynamic sitting/standing balance activities, attempted ambulation on level ground, and patient education to improve mobility, strength, balance and coordination. Required maximal verbal, tactile and manual cues   to promote static and dynamic balance in standing and promote coordination of bilateral, lower extremity(s). Group Therapeutic Exercise: (  10 Minutes):  Exercises per grid below to improve mobility, strength, balance and stiffness/soreness. Required moderate visual, verbal, manual and tactile cues to promote proper body alignment, promote proper body posture and promote proper body mechanics. Progressed range, repetitions and complexity of movement as indicated. Date:  1/29/18 Date:  1/30/18 Date:     ACTIVITY/EXERCISE AM PM AM PM AM PM   Ambulation:           Distance  Device  Duration         Seated Heel Raises x15B A  x15B A      Seated Toe Raises x15B A  x15B A      Seated Long Arc Quads x15B A  x15B A      Seated Marching x15B  AA-R, A-L  x15B  AA-R, A-L      Seated Hip Abduction x15B  AA-R, A-L  x15B  AA-R, A-L               B = bilateral; AA = active assistive; A = active; P = passive          Braces/Orthotics/Lines/Etc:   · IV  · O2 Device: Nasal cannula  Treatment/Session Assessment:    · Response to Treatment: see above  · Interdisciplinary Collaboration:   o Physical Therapy Assistant  o Registered Nurse  o Rehabilitation Attendant  · After treatment position/precautions:   o Up in chair  o Bed alarm/tab alert on  o Bed/Chair-wheels locked  o Call light within reach  o RN notified   · Compliance with Program/Exercises: Will assess as treatment progresses.   · Recommendations/Intent for next treatment session: \"Next visit will focus on advancements to more challenging activities and reduction in assistance provided\".   Total Treatment Duration:  PT Patient Time In/Time Out  Time In: 0984 (0910)  Time Out: 0895 (1145)    Krysta Kelly, PTA

## 2018-01-30 NOTE — PROGRESS NOTES
Problem: Mobility Impaired (Adult and Pediatric)  Goal: *Acute Goals and Plan of Care (Insert Text)  STG:  (1.)Ms. Penny Alexis will move from supine to sit and sit to supine , scoot up and down and roll side to side with MAXIMAL ASSIST within 3 day(s). (2.)Ms. Penny Alexis will transfer from bed to chair and chair to bed with MAXIMAL ASSIST using the least restrictive device within 3 day(s). (3.)Ms. Penny Alexis will participate in therapeutic activity/exerices x 12 minutes for increased strength within 3 days. (4.)Ms. Penny Alexis will propel wheelchair 15 ft with STAND BY ASSIST for increased functional independence within 3 days. LTG:  (1.)Ms. Penny Alexis will move from supine to sit and sit to supine , scoot up and down and roll side to side in bed with MODERATE ASSIST within 7 day(s). (2.)Ms. Penny Alexis will transfer from bed to chair and chair to bed with MODERATE ASSIST using the least restrictive device within 7 day(s). (3.)Ms. Penny Alexis will participate in therapeutic activity/exerices x 23 minutes for increased strength within 7 days. (4.)Ms. Penny Alexis will propel wheelchair 50 ft with STAND BY ASSIST for increased functional independence within 7 days.   ______________________________________________________________________________________________    PHYSICAL THERAPY: Daily Note, Treatment Day: 4th, PM 1/30/2018  INPATIENT: Hospital Day: 6  [place] / Product Type: Medicare /    R SUNDAY MCKEE     NAME/AGE/GENDER: Italo Mar is a 80 y.o. female   PRIMARY DIAGNOSIS: Closed right hip fracture (HCC)  right hip fracture Closed right hip fracture (HCC) Closed right hip fracture (HCC)  Procedure(s) (LRB):  FEMUR INSERTION INTRA MEDULLARY NAIL (Right)  4 Days Post-Op  ICD-10: Treatment Diagnosis:   · Generalized Muscle Weakness (M62.81)  · Difficulty in walking, Not elsewhere classified (R26.2)  · Other abnormalities of gait and mobility (R26.89)  · History of falling (Z91.81) Precaution/Allergies:  Tramadol; Morphine; Other medication; and Sulfa (sulfonamide antibiotics)      ASSESSMENT:     Ms. Bianka Crouch was supine upon contact and agreeable to PT. Patient able to perform supine to sit with mod assist, additional time, and cues for improved technique. Once seated EOB patient participates in  therapeutic strengthening exercises to improve functional strength for transfers, gait and overall mobility. Patient requires cues and assistance to perform exercises correctly. Patient returns to supine with mod assist and cues for technique. Overall slow progress towards physical therapy goals. No goals have been met thus far. Will continue efforts.     This section established at most recent assessment   PROBLEM LIST (Impairments causing functional limitations):  1. Decreased Strength  2. Decreased ADL/Functional Activities  3. Decreased Transfer Abilities  4. Decreased Ambulation Ability/Technique  5. Decreased Balance  6. Increased Pain  7. Decreased Activity Tolerance  8. Decreased Cognition   INTERVENTIONS PLANNED: (Benefits and precautions of physical therapy have been discussed with the patient.)  1. Balance Exercise  2. Bed Mobility  3. Family Education  4. Gait Training  5. Neuromuscular Re-education/Strengthening  6. Therapeutic Activites  7. Therapeutic Exercise/Strengthening  8. Transfer Training  9. Group Therapy     TREATMENT PLAN: Frequency/Duration: Twice times a week for duration of hospital stay  Rehabilitation Potential For Stated Goals: Guarded     RECOMMENDED REHABILITATION/EQUIPMENT: (at time of discharge pending progress): Due to the probability of continued deficits (see above) this patient will likely need continued skilled physical therapy after discharge.   Equipment:    None at this time              HISTORY:   History of Present Injury/Illness (Reason for Referral):  Per H&P: \"  HPI:   Patient history was obtained from the ER provider prior to seeing the patient.     Patient is a 80 y.o. female who presents to the ER after having a fall at her nursing home. She has lewy body dementia and cannot provide history about what happened. Family is here and states that she has actually fallen a few times. She was recently moved into a memory care unit for closer observation. She has had pain in rt hip since fall, and unable to bear weight. ER workup shows a hip fx. No known LOC.        ROS:  All systems have been reviewed and are negative except as stated in HPI or elsewhere. \"  Past Medical History/Comorbidities:   Ms. Ana Maria Chou  has a past medical history of Acute myocardial infarction of other anterior wall, initial episode of care [facility]) (8/30/2012); Acute systolic congestive heart failure- in setting of anterior MI, EF 35-40% (8/30/2012); Acute systolic heart failure ([address] ARF (acute renal failure) ([address] (9/29/2012); Arthritis; CAD (coronary artery disease); Carotid artery stenosis without cerebral infarction; Chronic pain- chronic narcotic use for spinal stenosis (8/30/2012); Coronary atherosclerosis of native coronary artery (3/16/2013); Diarrhea (10/2/2012); Dyslipidemia (8/30/2012); Dyspnea; Essential hypertension, benign (8/30/2012); Heart failure ([address] Hyperlipidemia; Hypertension; Ischemic colitis ([address] Lower GI bleed (3/12/2015); Myocardial infarction, anterolateral wall, subsequent care ([address] N&V (nausea and vomiting) (3/16/2013); Sepsis ([address] (2/12/2014); Spinal stenosis (8/30/2012); ST elevation (STEMI) myocardial infarction involving left anterior descending coronary artery ([address] and Syncope and collapse (2/12/2014). She also has no past medical history of Unspecified sleep apnea.   Ms. Ana Maria Chou  has a past surgical history that includes hx cholecystectomy; hx gyn; hx appendectomy; hx orthopaedic; hx other surgical; hx coronary stent placement; and pr cardiac surg procedure unlist.  Social History/Living Environment:   Home Environment: [address] Name: [facility]   One/Two [place] Residence: One story  Living Alone: No  Support Systems: Family member(s), Skilled nursing facility  Patient Expects to be Discharged to[de-identified] Skilled nursing facility  Current DME Used/Available at Home: Wheelchair  Prior Level of Function/Work/Activity:  Lives in memory care facility. Gets assistance for ADLs and is wheelchair bound. Recent falls reported. Number of Personal Factors/Comorbidities that affect the Plan of Care:  Dementia  Falls 3+: HIGH COMPLEXITY   EXAMINATION:   Most Recent Physical Functioning:   Gross Assessment:                  Posture:     Balance:  Sitting: Impaired  Sitting - Static: Fair (occasional)  Sitting - Dynamic: Fair (occasional)  Standing: Impaired  Standing - Static: Poor  Standing - Dynamic : Poor Bed Mobility:  Supine to Sit: Moderate assistance  Sit to Supine: Moderate assistance  Wheelchair Mobility:     Transfers:  Sit to Stand: Moderate assistance;Assist x2  Stand to Sit: Moderate assistance;Assist x2  Bed to Chair: Maximum assistance;Assist x2  Gait:            Body Structures Involved:  1. Bones  2. Joints  3. Muscles Body Functions Affected:  1. Sensory/Pain  2. Neuromusculoskeletal  3. Movement Related Activities and Participation Affected:  1. Learning and Applying Knowledge  2. General Tasks and Demands  3. Communication  4. Mobility  5. Self Care  6. Domestic Life  7. Interpersonal Interactions and Relationships  8. Community, Social and [place]   Number of elements that affect the Plan of Care: 4+: HIGH COMPLEXITY   CLINICAL PRESENTATION:   Presentation: Evolving clinical presentation with changing clinical characteristics: MODERATE COMPLEXITY   CLINICAL DECISION MAKIN [facility] Mobility Inpatient Short Form  How much difficulty does the patient currently have. .. Unable A Lot A Little None   1.   Turning over in bed (including adjusting bedclothes, sheets and blankets)? [] 1   [x] 2   [] 3   [] 4   2. Sitting down on and standing up from a chair with arms ( e.g., wheelchair, bedside commode, etc.)   [x] 1   [] 2   [] 3   [] 4   3. Moving from lying on back to sitting on the side of the bed? [x] 1   [] 2   [] 3   [] 4   How much help from another person does the patient currently need. .. Total A Lot A Little None   4. Moving to and from a bed to a chair (including a wheelchair)? [x] 1   [] 2   [] 3   [] 4   5. Need to walk in hospital room? [x] 1   [] 2   [] 3   [] 4   6. Climbing 3-5 steps with a railing? [x] 1   [] 2   [] 3   [] 4   © 2007, Trustees of [address] [place], [place] [id number], under license to [employer]. All rights reserved      Score:  Initial: 7 Most Recent: X (Date: -- )    Interpretation of Tool:  Represents activities that are increasingly more difficult (i.e. Bed mobility, Transfers, Gait). Score 24 23 22-20 19-15 14-10 9-7 6     Modifier CH CI CJ CK CL CM CN      ? Mobility - Walking and Moving Around:     - CURRENT STATUS: CM - 80%-99% impaired, limited or restricted    - GOAL STATUS: CL - 60%-79% impaired, limited or restricted    - D/C STATUS:  ---------------To be determined---------------  [place] / Product Type: Medicare /      Medical Necessity:     · Patient demonstrates GUARDED rehab potential due to higher previous functional level. Reason for Services/Other Comments:  · Patient continues to require skilled intervention due to decreased functional mobility. Use of outcome tool(s) and clinical judgement create a POC that gives a: Questionable prediction of patient's progress: MODERATE COMPLEXITY            TREATMENT:   (In addition to Assessment/Re-Assessment sessions the following treatments were rendered)   Pre-treatment Symptoms/Complaints: None  Pain: Initial:   Pain Intensity 1: 0  Post Session:  0/10     Therapeutic Activity: (    10 Minutes):   Therapeutic activities including bed mobility training, posture training, scooting, static/dynamic sitting balance activities, and patient education to improve mobility, strength, balance and coordination. Required maximal verbal, tactile and manual cues   to promote static and dynamic balance in standing and promote coordination of bilateral, lower extremity(s). Therapeutic Exercise: (  14 Minutes):  Exercises per grid below to improve mobility, strength, balance and stiffness/soreness. Required moderate visual, verbal, manual and tactile cues to promote proper body alignment, promote proper body posture and promote proper body mechanics. Progressed range, repetitions and complexity of movement as indicated. Date:  1/29/18 Date:  1/30/18 Date:     ACTIVITY/EXERCISE AM PM AM PM AM PM   Ambulation:           Distance  Device  Duration         Seated Heel Raises x15B A  x15B A 2x15B A     Seated Toe Raises x15B A  x15B A 2x15B A     Seated Long Arc Quads x15B A  x15B A 2x15B A     Seated Marching x15B  AA-R, A-L  x15B  AA-R, A-L x15B  AA-R, A-L     Seated Hip Abduction x15B  AA-R, A-L  x15B  AA-R, A-L x15B  AA-R, A-L              B = bilateral; AA = active assistive; A = active; P = passive          Braces/Orthotics/Lines/Etc:   · IV  · O2 Device: Nasal cannula  Treatment/Session Assessment:    · Response to Treatment: see above  · Interdisciplinary Collaboration:   o Physical Therapy Assistant  o Registered Nurse  o Rehabilitation Attendant  · After treatment position/precautions:   o Supine in bed  o Bed alarm/tab alert on  o Bed/Chair-wheels locked  o Bed in low position  o Call light within reach  o RN notified   · Compliance with Program/Exercises: Will assess as treatment progresses. · Recommendations/Intent for next treatment session: \"Next visit will focus on advancements to more challenging activities and reduction in assistance provided\".   Total Treatment Duration:  PT Patient Time In/Time Out  Time In: 1310  Time Out: [address], PTA

## 2018-01-30 NOTE — PROGRESS NOTES
Problem: Interdisciplinary Rounds  Goal: Interdisciplinary Rounds  Outcome: Progressing Towards Goal  Interdisciplinary team rounds were held 1/30/2018 with the following team members:Care Management, Occupational Therapy, Physician and . Plan of care discussed. See clinical pathway and/or care plan for interventions and desired outcomes.

## 2018-01-31 VITALS
RESPIRATION RATE: 18 BRPM | WEIGHT: 121 LBS | TEMPERATURE: 98.7 F | DIASTOLIC BLOOD PRESSURE: 81 MMHG | HEART RATE: 67 BPM | SYSTOLIC BLOOD PRESSURE: 149 MMHG | HEIGHT: 56 IN | BODY MASS INDEX: 27.22 KG/M2 | OXYGEN SATURATION: 97 %

## 2018-01-31 LAB
ABO + RH BLD: NORMAL
ANION GAP SERPL CALC-SCNC: 9 MMOL/L (ref 7–16)
BASOPHILS # BLD: 0 K/UL (ref 0–0.2)
BASOPHILS NFR BLD: 0 % (ref 0–2)
BLD PROD TYP BPU: NORMAL
BLOOD GROUP ANTIBODIES SERPL: NORMAL
BPU ID: NORMAL
BUN SERPL-MCNC: 20 MG/DL (ref 8–23)
CALCIUM SERPL-MCNC: 9.3 MG/DL (ref 8.3–10.4)
CHLORIDE SERPL-SCNC: 98 MMOL/L (ref 98–107)
CO2 SERPL-SCNC: 25 MMOL/L (ref 21–32)
CREAT SERPL-MCNC: 0.75 MG/DL (ref 0.6–1)
CROSSMATCH RESULT,%XM: NORMAL
DIFFERENTIAL METHOD BLD: ABNORMAL
EOSINOPHIL # BLD: 0.1 K/UL (ref 0–0.8)
EOSINOPHIL NFR BLD: 2 % (ref 0.5–7.8)
ERYTHROCYTE [DISTWIDTH] IN BLOOD BY AUTOMATED COUNT: 13.7 % (ref 11.9–14.6)
GLUCOSE SERPL-MCNC: 104 MG/DL (ref 65–100)
HCT VFR BLD AUTO: 32 % (ref 35.8–46.3)
HGB BLD-MCNC: 11 G/DL (ref 11.7–15.4)
IMM GRANULOCYTES # BLD: 0 K/UL (ref 0–0.5)
IMM GRANULOCYTES NFR BLD AUTO: 0 % (ref 0–5)
LYMPHOCYTES # BLD: 1.5 K/UL (ref 0.5–4.6)
LYMPHOCYTES NFR BLD: 16 % (ref 13–44)
MCH RBC QN AUTO: 30.1 PG (ref 26.1–32.9)
MCHC RBC AUTO-ENTMCNC: 34.4 G/DL (ref 31.4–35)
MCV RBC AUTO: 87.4 FL (ref 79.6–97.8)
MONOCYTES # BLD: 0.9 K/UL (ref 0.1–1.3)
MONOCYTES NFR BLD: 10 % (ref 4–12)
NEUTS SEG # BLD: 6.8 K/UL (ref 1.7–8.2)
NEUTS SEG NFR BLD: 72 % (ref 43–78)
PLATELET # BLD AUTO: 277 K/UL (ref 150–450)
PMV BLD AUTO: 9.6 FL (ref 10.8–14.1)
POTASSIUM SERPL-SCNC: 3.8 MMOL/L (ref 3.5–5.1)
RBC # BLD AUTO: 3.66 M/UL (ref 4.05–5.25)
SODIUM SERPL-SCNC: 132 MMOL/L (ref 136–145)
SPECIMEN EXP DATE BLD: NORMAL
STATUS OF UNIT,%ST: NORMAL
UNIT DIVISION, %UDIV: 0
WBC # BLD AUTO: 9.4 K/UL (ref 4.3–11.1)

## 2018-01-31 PROCEDURE — 85025 COMPLETE CBC W/AUTO DIFF WBC: CPT | Performed by: INTERNAL MEDICINE

## 2018-01-31 PROCEDURE — 74011250637 HC RX REV CODE- 250/637: Performed by: FAMILY MEDICINE

## 2018-01-31 PROCEDURE — 36415 COLL VENOUS BLD VENIPUNCTURE: CPT | Performed by: INTERNAL MEDICINE

## 2018-01-31 PROCEDURE — 74011250637 HC RX REV CODE- 250/637: Performed by: NURSE PRACTITIONER

## 2018-01-31 PROCEDURE — 74011250636 HC RX REV CODE- 250/636: Performed by: NURSE PRACTITIONER

## 2018-01-31 PROCEDURE — 80048 BASIC METABOLIC PNL TOTAL CA: CPT | Performed by: INTERNAL MEDICINE

## 2018-01-31 RX ORDER — FERROUS SULFATE, DRIED 160(50) MG
1 TABLET, EXTENDED RELEASE ORAL
Qty: 90 TAB | Refills: 0 | Status: SHIPPED
Start: 2018-01-31

## 2018-01-31 RX ORDER — ENOXAPARIN SODIUM 100 MG/ML
30 INJECTION SUBCUTANEOUS EVERY 24 HOURS
Qty: 8.4 ML | Refills: 0 | Status: SHIPPED
Start: 2018-01-31 | End: 2018-02-28

## 2018-01-31 RX ORDER — OXYCODONE HYDROCHLORIDE 5 MG/1
5 TABLET ORAL
Qty: 15 TAB | Refills: 0 | Status: SHIPPED | OUTPATIENT
Start: 2018-01-31

## 2018-01-31 RX ADMIN — OXYBUTYNIN CHLORIDE 5 MG: 5 TABLET ORAL at 09:26

## 2018-01-31 RX ADMIN — CARVEDILOL 3.12 MG: 3.12 TABLET, FILM COATED ORAL at 09:26

## 2018-01-31 RX ADMIN — ENOXAPARIN SODIUM 30 MG: 30 INJECTION SUBCUTANEOUS at 09:27

## 2018-01-31 RX ADMIN — ONDANSETRON 4 MG: 2 INJECTION INTRAMUSCULAR; INTRAVENOUS at 00:50

## 2018-01-31 RX ADMIN — Medication 5 ML: at 04:03

## 2018-01-31 RX ADMIN — PANTOPRAZOLE SODIUM 40 MG: 40 TABLET, DELAYED RELEASE ORAL at 04:02

## 2018-01-31 RX ADMIN — CALCIUM CARBONATE 500 MG (1,250 MG)-VITAMIN D3 200 UNIT TABLET 1 TABLET: at 09:26

## 2018-01-31 RX ADMIN — OXYCODONE HYDROCHLORIDE 5 MG: 5 TABLET ORAL at 01:17

## 2018-01-31 RX ADMIN — DOCUSATE SODIUM 100 MG: 100 CAPSULE, LIQUID FILLED ORAL at 09:26

## 2018-01-31 RX ADMIN — ACETAMINOPHEN 650 MG: 325 TABLET, FILM COATED ORAL at 04:02

## 2018-01-31 RX ADMIN — LISINOPRIL 10 MG: 5 TABLET ORAL at 09:26

## 2018-01-31 RX ADMIN — ROSUVASTATIN CALCIUM 5 MG: 5 TABLET ORAL at 09:26

## 2018-01-31 NOTE — PROGRESS NOTES
Patient scheduled for transport to [facility] via stretcher by [facility] today at 10:30am.  Patient and emergency contact Shana Villareal notified of transport.       Care Management Interventions  Transition of Care Consult (CM Consult): SNF  Physical Therapy Consult: Yes  Occupational Therapy Consult: Yes  Current Support Network: Laurie discussed with Pt/Family/Caregiver: Yes  Freedom of Choice Offered: Yes  Discharge Location  Discharge Placement: Skilled nursing facility [facility]

## 2018-01-31 NOTE — PROGRESS NOTES
ORTH FRACTURE PROGRESS NOTE    2018  Admit Date:   2018    Post Op day: 5 Days Post-Op    Subjective:    Tri Giordano SEDATED     PT/OT:   Gait:                    Vital Signs:    Patient Vitals for the past 8 hrs:   BP Temp Pulse Resp SpO2   18 0726 149/81 98.7 °F (37.1 °C) 67 18 97 %   18 0407 181/78 - - 18 99 %   18 0340 - 98.3 °F (36.8 °C) 80 20 -     Temp (24hrs), Av.4 °F (36.9 °C), Min:97.9 °F (36.6 °C), Max:99 °F (37.2 °C)      Pain Control:   Pain Assessment  Pain Scale 1: FLACC  Pain Intensity 1: 0  Pain Onset 1: post op  Pain Location 1: Leg  Pain Orientation 1: Right  Pain Description 1: Aching  Pain Intervention(s) 1: Medication (see MAR)    Meds:    Current Facility-Administered Medications   Medication Dose Route Frequency    0.9% sodium chloride infusion 250 mL  250 mL IntraVENous PRN    0.9% sodium chloride infusion 250 mL  250 mL IntraVENous PRN    HYDROmorphone (PF) (DILAUDID) injection 0.5 mg  0.5 mg IntraVENous Q4H PRN    sodium chloride (NS) flush 5-10 mL  5-10 mL IntraVENous Q8H    sodium chloride (NS) flush 5-10 mL  5-10 mL IntraVENous PRN    acetaminophen (TYLENOL) tablet 650 mg  650 mg Oral Q8H    oxyCODONE IR (ROXICODONE) tablet 5 mg  5 mg Oral Q4H PRN    ondansetron (ZOFRAN) injection 4 mg  4 mg IntraVENous Q4H PRN    docusate sodium (COLACE) capsule 100 mg  100 mg Oral BID    alum-mag hydroxide-simeth (MYLANTA) oral suspension 30 mL  30 mL Oral Q4H PRN    calcium-vitamin D (OS-YEIMY) 500 mg-200 unit tablet  1 Tab Oral TID WITH MEALS    enoxaparin (LOVENOX) injection 30 mg  30 mg SubCUTAneous Q24H    carvedilol (COREG) tablet 3.125 mg  3.125 mg Oral BID WITH MEALS    DULoxetine (CYMBALTA) capsule 60 mg  60 mg Oral QHS    pantoprazole (PROTONIX) tablet 40 mg  40 mg Oral ACB    lisinopril (PRINIVIL, ZESTRIL) tablet 10 mg  10 mg Oral DAILY    oxybutynin (DITROPAN) tablet 5 mg  5 mg Oral BID    rOPINIRole (REQUIP) tablet 5 mg  5 mg Oral QHS    rosuvastatin (CRESTOR) tablet 5 mg  5 mg Oral Q MON, WED & FRI    hydrALAZINE (APRESOLINE) 20 mg/mL injection 10 mg  10 mg IntraVENous Q6H PRN       LAB:    Recent Labs      01/31/18   0703   HCT  32.0*   HGB  11.0*       24 Hour Assessment Issues:    Disoriented (baseline)    Discharge Planning: SNF    Transfuse PRBC's:      Assessment & Physician's Comment:  Dressing is clean, dry, and intact  Neurovascular checks within normal limits    Principal Problem:    Closed right hip fracture ([facility] [address] (1/25/2018)    Active Problems:    Essential hypertension, benign (8/30/2012)      Chronic pain- chronic narcotic use for spinal stenosis (8/30/2012)      Lewy body dementia (2/17/2017)      CKD (chronic kidney disease) stage 3, GFR 30-59 ml/min (2/17/2017)      Chronic diastolic congestive heart failure ([facility] [address] (2/17/2017)      DNR (do not resuscitate) (2/18/2017)      Acute blood loss anemia (1/30/2018)        Plan:  [address] TO SNF      Annie Biggs NP

## 2018-01-31 NOTE — DISCHARGE SUMMARY
Physician Discharge Summary       Patient: Evie Villatoro MRN: [id number]  SSN: [id number]    YOB: 1932  Age: 80 y.o. Sex: female    PCP: Alisson Smart MD    Allergies: Tramadol; Morphine; Other medication; and Sulfa (sulfonamide antibiotics)    Admit date: 1/25/2018  Admitting Provider: Chantell Medrano MD    Discharge date: 1/31/2018  Discharging Provider: Aureliano Rosario MD    * Admission Diagnoses: Closed right hip fracture [facility])  right hip fracture    * Discharge Diagnoses:    Hospital Problems as of 1/31/2018  Date Reviewed: 1/26/2018          Codes Class Noted - Resolved POA    Acute blood loss anemia ICD-10-CM: D62  ICD-9-CM: 285.1  1/30/2018 - Present Unknown        * (Principal)Closed right hip fracture ([facility] [address] ICD-10-CM: S72.001A  ICD-9-CM: 820.8  1/25/2018 - Present Yes        DNR (do not resuscitate) (Chronic) ICD-10-CM: Z66  ICD-9-CM: V49.86  2/18/2017 - Present Yes        Lewy body dementia (Chronic) ICD-10-CM: W37.51, F02.80  ICD-9-CM: 331.82  2/17/2017 - Present Yes        CKD (chronic kidney disease) stage 3, GFR 30-59 ml/min (Chronic) ICD-10-CM: N18.3  ICD-9-CM: 585.3  2/17/2017 - Present Yes        Chronic diastolic congestive heart failure (HCC) (Chronic) ICD-10-CM: I50.32  ICD-9-CM: 428.32, 428.0  2/17/2017 - Present Yes        Essential hypertension, benign (Chronic) ICD-10-CM: I10  ICD-9-CM: 401.1  8/30/2012 - Present Yes        Chronic pain- chronic narcotic use for spinal stenosis (Chronic) ICD-10-CM: G89.29  ICD-9-CM: 338.29  8/30/2012 - Present Yes              * Hospital Course:     Ms. Yohannes Camacho is an 81 yo WF, with a pmh of Lewy Body Dementia, who presented 1/25 after a ground level fall at [facility] where she sustained a right femoral hip fracture for which she is POD 5. She has received four units total PRBCs for acute blood loss anemia with hg down to 7.7 and it is now stable around 11.   She has been medically stable for discharge for several days but we have been awaiting precert from insurance for several days. She now has precert and is medically stable for discharge to the [facility] for continued rehab. * Procedures:   Procedure(s): FEMUR INSERTION INTRA MEDULLARY NAIL    Consults: Orthopedic Surgery    Significant Diagnostic Studies:     Examination: CT scan of the brain without contrast.     History: fall head injury, 80 years Female Pt arrives via EMS from [facility] for an unwitnessed fall and was found next to her bed     Technique: 5 mm axial imaging of the brain from the posterior fossa to the  vertex. Radiation dose reduction techniques were used for this study:  Our CT  scanners use one or all of the following: Automated exposure control, adjustment  of the mA and/or kVp according to patient's size, iterative reconstruction.     Comparison:  CT brain April 18, 2015     Findings: Persistent probable moderate microvascular disease with senescent  related atrophy. The ventricles, sulci are age-appropriate. No intracranial  hemorrhage or extra-axial collection is identified. No evidence of acute  infarct. No mass effect or midline shift is present. Basal cisterns are intact. The visualized paranasal sinuses and mastoid air cells are clear. The orbits,  bones, and soft tissues are normal in appearance. Image quality somewhat  degraded by motion artifact.     IMPRESSION  IMPRESSION:  No acute intracranial abnormality. Exam:  CT cervical spine without contrast     History: fall, 86 years Female Pt arrives via EMS from [facility] for  an unwitnessed fall and was found next to her bed     Technique: Standard departmental protocol CT of the cervical spine was performed  without intravenous contrast administration. Coronal and sagittal reformats  were obtained.   Radiation dose reduction techniques were used for this study:   Our CT scanners use one or all of the following: Automated exposure control,  adjustment of the mA and/or kVp according to patient's size, iterative  reconstruction.     Comparison: CT cervical spine March 16, 2013     Findings: Persistent mild grade 1 anterolisthesis of the C7 vertebral body on  T1. Mild anterior loss of vertebral body height of C6 and C7 and significant  loss of disc space height C6-T1 with small anterior and posterior endplate  osteophytes appear progressed since prior. .  No evidence of acute fracture. No  evidence of significant spinal canal stenosis. Mild diffuse osteopenia. Visualized prevertebral and paravertebral soft tissues unremarkable. Image  quality slightly degraded by motion artifact.     IMPRESSION  Impression:  No definite evidence of acute injury. Interval progression of mild  lower cervical degenerative disc disease as above. Discharge Exam:    General: asleep but arousable, confused at baseline, appears stated age  Eyes; non icteric, EOMI  Neck; supple  CV: RRR  Pulm; CTAB  Abd; soft, non tender, non distended    * Discharge Condition: stable  * Disposition: [facility])    Discharge Medications:  Current Discharge Medication List      START taking these medications    Details   calcium-vitamin D (OYSTER SHELL) 500 mg(1,250mg) -200 unit per tablet Take 1 Tab by mouth three (3) times daily (with meals). Qty: 90 Tab, Refills: 0      enoxaparin (LOVENOX) 30 mg/0.3 mL injection 0.3 mL by SubCUTAneous route every twenty-four (24) hours for 28 days. Qty: 8.4 mL, Refills: 0         CONTINUE these medications which have CHANGED    Details   oxyCODONE IR (ROXICODONE) 5 mg immediate release tablet Take 1 Tab by mouth every four (4) hours as needed for Pain. Max Daily Amount: 30 mg.  Qty: 15 Tab, Refills: 0    Associated Diagnoses: Other chronic pain         CONTINUE these medications which have NOT CHANGED    Details   carvedilol (COREG) 3.125 mg tablet Take 1 Tab by mouth two (2) times daily (with meals).   Qty: 60 Tab, Refills: 0      lisinopril (PRINIVIL, ZESTRIL) 10 mg tablet Take 10 mg by mouth daily. ondansetron hcl (ZOFRAN, AS HYDROCHLORIDE,) 4 mg tablet Take 4 mg by mouth every eight (8) hours as needed for Nausea. acetaminophen (TYLENOL) 500 mg tablet Take 500 mg by mouth every six (6) hours as needed for Pain. Indications: Pain      cyanocobalamin 1,000 mcg tablet Take 1,000 mcg by mouth daily. Indications: PREVENTION OF VITAMIN B12 DEFICIENCY      diclofenac (VOLTAREN) 1 % gel Apply 2 g to affected area four (4) times daily. Indications: OSTEOARTHRITIS      docusate sodium (COLACE) 100 mg capsule Take 100 mg by mouth three (3) times daily. Indications: Constipation      DULoxetine (CYMBALTA) 60 mg capsule Take 60 mg by mouth nightly. esomeprazole (NEXIUM) 40 mg capsule Take 40 mg by mouth daily. B.infantis-B.ani-B.long-B.bifi (PROBIOTIC 4X) 10-15 mg TbEC Take 1 Tab by mouth daily. rosuvastatin (CRESTOR) 5 mg tablet Take 5 mg by mouth every Monday, Wednesday, Friday. oxybutynin (DITROPAN) 5 mg tablet Take 5 mg by mouth two (2) times a day. ferrous sulfate (IRON) 325 mg (65 mg iron) EC tablet Take 1 Tab by mouth two (2) times a day. Qty: 60 Tab, Refills: 0      aspirin 81 mg chewable tablet Take 1 Tab by mouth daily. Resume Aspirin on March 3rd (  Will complete 7 days off   ASA )  Qty: 30 Tab, Refills: 0      rOPINIRole (REQUIP) 5 mg tablet Take 5 mg by mouth nightly. nitroglycerin (NITROSTAT) 0.4 mg SL tablet 1 Tab by SubLINGual route every five (5) minutes as needed for Chest Pain. Qty: 1 Bottle, Refills: 3             * Follow-up Care/Patient Instructions:   Activity: as directed by PT and ortho  Diet: cardiac  Wound Care: for hip per ortho instructions    Follow-up Information     Follow up With Details Comments Contact Info    Rafat Carmichael MD  follow up appointment Feb. 8, 2018 at [address] [place] [address] [id number]      Alisson Smart MD Schedule an appointment as soon as possible for a visit in 1 week needs Hemoglobin check in 5-7 days Patient can only remember the practice name and not the physician          35 minutes spent in discharge planning and coordination of care.      Signed:  Alayna Dodson MD  1/31/2018  8:40 AM

## 2018-01-31 NOTE — PROGRESS NOTES
Problem: Falls - Risk of  Goal: *Absence of Falls  Document Cali Fall Risk and appropriate interventions in the flowsheet.    Outcome: Progressing Towards Goal  Fall Risk Interventions:  Mobility Interventions: Bed/chair exit alarm, Communicate number of staff needed for ambulation/transfer, OT consult for ADLs, Patient to call before getting OOB, PT Consult for mobility concerns, PT Consult for assist device competence, Strengthening exercises (ROM-active/passive), Utilize walker, cane, or other assitive device    Mentation Interventions: Adequate sleep, hydration, pain control, Bed/chair exit alarm, Door open when patient unattended, Increase mobility, More frequent rounding, Reorient patient, Room close to nurse's station, Toileting rounds, Update white board    Medication Interventions: Assess postural VS orthostatic hypotension, Bed/chair exit alarm, Evaluate medications/consider consulting pharmacy, Patient to call before getting OOB, Teach patient to arise slowly    Elimination Interventions: Bed/chair exit alarm, Call light in reach, Patient to call for help with toileting needs, Toilet paper/wipes in reach, Toileting schedule/hourly rounds    History of Falls Interventions: Bed/chair exit alarm, Consult care management for discharge planning, Door open when patient unattended, Evaluate medications/consider consulting pharmacy, Room close to nurse's station

## 2018-01-31 NOTE — PROGRESS NOTES
TRANSFER - OUT REPORT:    Verbal report given to Shirlene King RN at [facility] ([phone]) on Clifford Urban  being transferred to [facility], Room 306 for routine progression of care       Report consisted of patients Situation, Background, Assessment and   Recommendations(SBAR). Information from the following report(s) SBAR was reviewed with the receiving nurse. Lines:       Opportunity for questions and clarification was provided.       Patient transported with:  Personal belongings

## 2018-02-01 ENCOUNTER — PATIENT OUTREACH (OUTPATIENT)
Dept: CASE MANAGEMENT | Age: 83
End: 2018-02-01

## 2018-02-01 NOTE — PROGRESS NOTES
This note will not be viewable in [address] LTC resident discharged back to non preferred provider network SNF ([address])   All care is provided by facility staff and \"in house\" MD. No SANDRA outreach indicated due to patient's disposition. Will close my case.

## 2018-04-23 ENCOUNTER — HOSPITAL ENCOUNTER (EMERGENCY)
Age: 83
Discharge: HOME | End: 2018-04-23
Attending: EMERGENCY MEDICINE
Payer: COMMERCIAL

## 2018-04-23 ENCOUNTER — APPOINTMENT (OUTPATIENT)
Dept: CT IMAGING | Age: 83
End: 2018-04-23
Attending: EMERGENCY MEDICINE
Payer: COMMERCIAL

## 2018-04-23 ENCOUNTER — APPOINTMENT (OUTPATIENT)
Dept: GENERAL RADIOLOGY | Age: 83
End: 2018-04-23
Attending: EMERGENCY MEDICINE
Payer: COMMERCIAL

## 2018-04-23 VITALS
DIASTOLIC BLOOD PRESSURE: 71 MMHG | WEIGHT: 125 LBS | HEART RATE: 83 BPM | BODY MASS INDEX: 25.2 KG/M2 | TEMPERATURE: 98.1 F | SYSTOLIC BLOOD PRESSURE: 131 MMHG | HEIGHT: 59 IN | OXYGEN SATURATION: 96 % | RESPIRATION RATE: 23 BRPM

## 2018-04-23 DIAGNOSIS — R53.83 FATIGUE, UNSPECIFIED TYPE: Primary | ICD-10-CM

## 2018-04-23 LAB
ALBUMIN SERPL-MCNC: 3.4 G/DL (ref 3.2–4.6)
ALBUMIN SERPL-MCNC: 3.4 G/DL (ref 3.2–4.6)
ALBUMIN/GLOB SERPL: 0.9 {RATIO} (ref 1.2–3.5)
ALBUMIN/GLOB SERPL: 0.9 {RATIO} (ref 1.2–3.5)
ALP SERPL-CCNC: 105 U/L (ref 50–136)
ALP SERPL-CCNC: 107 U/L (ref 50–136)
ALT SERPL-CCNC: 15 U/L (ref 12–65)
ALT SERPL-CCNC: 16 U/L (ref 12–65)
ANION GAP SERPL CALC-SCNC: 6 MMOL/L (ref 7–16)
ANION GAP SERPL CALC-SCNC: 7 MMOL/L (ref 7–16)
AST SERPL-CCNC: 13 U/L (ref 15–37)
AST SERPL-CCNC: 14 U/L (ref 15–37)
ATRIAL RATE: 84 BPM
BACTERIA URNS QL MICRO: 0 /HPF
BASOPHILS # BLD: 0 K/UL (ref 0–0.2)
BASOPHILS NFR BLD: 0 % (ref 0–2)
BILIRUB SERPL-MCNC: 0.2 MG/DL (ref 0.2–1.1)
BILIRUB SERPL-MCNC: 0.2 MG/DL (ref 0.2–1.1)
BUN SERPL-MCNC: 31 MG/DL (ref 8–23)
BUN SERPL-MCNC: 32 MG/DL (ref 8–23)
CALCIUM SERPL-MCNC: 9.7 MG/DL (ref 8.3–10.4)
CALCIUM SERPL-MCNC: 9.8 MG/DL (ref 8.3–10.4)
CALCULATED P AXIS, ECG09: 59 DEGREES
CALCULATED R AXIS, ECG10: 62 DEGREES
CALCULATED T AXIS, ECG11: 75 DEGREES
CASTS URNS QL MICRO: NORMAL /LPF
CHLORIDE SERPL-SCNC: 102 MMOL/L (ref 98–107)
CHLORIDE SERPL-SCNC: 102 MMOL/L (ref 98–107)
CO2 SERPL-SCNC: 27 MMOL/L (ref 21–32)
CO2 SERPL-SCNC: 28 MMOL/L (ref 21–32)
CREAT SERPL-MCNC: 1.02 MG/DL (ref 0.6–1)
CREAT SERPL-MCNC: 1.08 MG/DL (ref 0.6–1)
DIAGNOSIS, 93000: NORMAL
DIFFERENTIAL METHOD BLD: ABNORMAL
EOSINOPHIL # BLD: 0.3 K/UL (ref 0–0.8)
EOSINOPHIL NFR BLD: 3 % (ref 0.5–7.8)
EPI CELLS #/AREA URNS HPF: NORMAL /HPF
ERYTHROCYTE [DISTWIDTH] IN BLOOD BY AUTOMATED COUNT: 13.7 % (ref 11.9–14.6)
GLOBULIN SER CALC-MCNC: 3.7 G/DL (ref 2.3–3.5)
GLOBULIN SER CALC-MCNC: 3.7 G/DL (ref 2.3–3.5)
GLUCOSE SERPL-MCNC: 108 MG/DL (ref 65–100)
GLUCOSE SERPL-MCNC: 108 MG/DL (ref 65–100)
HCT VFR BLD AUTO: 36.6 % (ref 35.8–46.3)
HGB BLD-MCNC: 12.3 G/DL (ref 11.7–15.4)
IMM GRANULOCYTES # BLD: 0 K/UL (ref 0–0.5)
IMM GRANULOCYTES NFR BLD AUTO: 0 % (ref 0–5)
LYMPHOCYTES # BLD: 1.3 K/UL (ref 0.5–4.6)
LYMPHOCYTES NFR BLD: 16 % (ref 13–44)
MAGNESIUM SERPL-MCNC: 2.2 MG/DL (ref 1.8–2.4)
MCH RBC QN AUTO: 31.1 PG (ref 26.1–32.9)
MCHC RBC AUTO-ENTMCNC: 33.6 G/DL (ref 31.4–35)
MCV RBC AUTO: 92.4 FL (ref 79.6–97.8)
MONOCYTES # BLD: 0.6 K/UL (ref 0.1–1.3)
MONOCYTES NFR BLD: 8 % (ref 4–12)
NEUTS SEG # BLD: 5.8 K/UL (ref 1.7–8.2)
NEUTS SEG NFR BLD: 73 % (ref 43–78)
P-R INTERVAL, ECG05: 176 MS
PHOSPHATE SERPL-MCNC: 4.1 MG/DL (ref 2.3–3.7)
PLATELET # BLD AUTO: 335 K/UL (ref 150–450)
PMV BLD AUTO: 10.1 FL (ref 10.8–14.1)
POTASSIUM SERPL-SCNC: 4.5 MMOL/L (ref 3.5–5.1)
POTASSIUM SERPL-SCNC: 4.5 MMOL/L (ref 3.5–5.1)
PROT SERPL-MCNC: 7.1 G/DL (ref 6.3–8.2)
PROT SERPL-MCNC: 7.1 G/DL (ref 6.3–8.2)
Q-T INTERVAL, ECG07: 364 MS
QRS DURATION, ECG06: 78 MS
QTC CALCULATION (BEZET), ECG08: 430 MS
RBC # BLD AUTO: 3.96 M/UL (ref 4.05–5.25)
RBC #/AREA URNS HPF: 0 /HPF
SODIUM SERPL-SCNC: 136 MMOL/L (ref 136–145)
SODIUM SERPL-SCNC: 136 MMOL/L (ref 136–145)
TROPONIN I SERPL-MCNC: <0.02 NG/ML (ref 0.02–0.05)
VENTRICULAR RATE, ECG03: 84 BPM
WBC # BLD AUTO: 8 K/UL (ref 4.3–11.1)
WBC URNS QL MICRO: NORMAL /HPF

## 2018-04-23 PROCEDURE — 84484 ASSAY OF TROPONIN QUANT: CPT | Performed by: EMERGENCY MEDICINE

## 2018-04-23 PROCEDURE — 84100 ASSAY OF PHOSPHORUS: CPT | Performed by: EMERGENCY MEDICINE

## 2018-04-23 PROCEDURE — 99285 EMERGENCY DEPT VISIT HI MDM: CPT | Performed by: EMERGENCY MEDICINE

## 2018-04-23 PROCEDURE — 70450 CT HEAD/BRAIN W/O DYE: CPT

## 2018-04-23 PROCEDURE — 93005 ELECTROCARDIOGRAM TRACING: CPT | Performed by: EMERGENCY MEDICINE

## 2018-04-23 PROCEDURE — 83735 ASSAY OF MAGNESIUM: CPT | Performed by: EMERGENCY MEDICINE

## 2018-04-23 PROCEDURE — 74011250636 HC RX REV CODE- 250/636: Performed by: EMERGENCY MEDICINE

## 2018-04-23 PROCEDURE — 81015 MICROSCOPIC EXAM OF URINE: CPT | Performed by: EMERGENCY MEDICINE

## 2018-04-23 PROCEDURE — 80053 COMPREHEN METABOLIC PANEL: CPT | Performed by: EMERGENCY MEDICINE

## 2018-04-23 PROCEDURE — 85025 COMPLETE CBC W/AUTO DIFF WBC: CPT | Performed by: EMERGENCY MEDICINE

## 2018-04-23 PROCEDURE — 71046 X-RAY EXAM CHEST 2 VIEWS: CPT

## 2018-04-23 PROCEDURE — 96360 HYDRATION IV INFUSION INIT: CPT | Performed by: EMERGENCY MEDICINE

## 2018-04-23 RX ADMIN — SODIUM CHLORIDE 1000 ML: 900 INJECTION, SOLUTION INTRAVENOUS at 21:25

## 2018-04-23 NOTE — ED PROVIDER NOTES
HPI Comments: Patient is an 81 yo female who presents with some lethargy at the nursing home today. Per daughter she was told that she was less responsive than usual when checked on by the nursing home staff and they placed her on oxygen and called EMS and by the time EMS arrived patient fully awake and alert and at baseline. Patient states \"I just got a little tired\". Patients daughter states she is at baseline, acting like herself, pleasantly confused. Patient answers questions appropriately and denies any pain, specifically in her chest or abdomen, no headache, no unilateral weakness or loss of sensation, no shortness of breath, no cough, no further complaints. Patient well appearing, NAD at this time, sitting up comfortably in bed. Patient is a 80 y.o. female presenting with fatigue. The history is provided by the patient. No  was used. Fatigue   Pertinent negatives include no shortness of breath, no chest pain, no vomiting, no headaches and no nausea.         Past Medical History:   Diagnosis Date    Acute myocardial infarction of other anterior wall, initial episode of care [facility]) 8/30/2012    MI - Had a stent placed August 10,4888    Acute systolic congestive heart failure- in setting of anterior MI, EF 35-40% 0/74/9571    Acute systolic heart failure (HCC)     resolved at discharge, EF 35% at time of MI, 55% by echo prior to discharge    ARF (acute renal failure) ([facility] [address] 9/29/2012    Arthritis     CAD (coronary artery disease)     MI    Carotid artery stenosis without cerebral infarction     Chronic pain- chronic narcotic use for spinal stenosis 8/30/2012    Coronary atherosclerosis of native coronary artery 3/16/2013    Diarrhea 10/2/2012    Dyslipidemia 8/30/2012    Dyspnea     unspecified    Essential hypertension, benign 8/30/2012    Heart failure (HCC)     denies heart failure    Hyperlipidemia     other unsp dyslipidemia    Hypertension     controlled, benign  Ischemic colitis ([facility] [address]     Lower GI bleed 3/12/2015    Myocardial infarction, anterolateral wall, subsequent care [facility])     s/p 2.5mm Xience stent to focal lesion in mid LAD 8/2012    N&V (nausea and vomiting) 3/16/2013    Sepsis ([facility] [address] 2/12/2014    Spinal stenosis 8/30/2012    ST elevation (STEMI) myocardial infarction involving left anterior descending coronary artery (HCC)     Syncope and collapse 2/12/2014       Past Surgical History:   Procedure Laterality Date    CARDIAC SURG PROCEDURE UNLIST      stent    HX APPENDECTOMY      HX CHOLECYSTECTOMY      HX CORONARY STENT PLACEMENT      2012 for MI    HX GYN      hysterectomy    HX ORTHOPAEDIC      moreno knees, ankle and spinal cord stimulator    HX OTHER SURGICAL      cord stimulator for pain control removed         Family History:   Problem Relation Age of Onset    Hypertension Mother     Stroke Mother     Hypertension Father        Social History     Social History    Marital status:      Spouse name: N/A    Number of children: N/A    Years of education: N/A     Occupational History    Not on file. Social History Main Topics    Smoking status: Never Smoker    Smokeless tobacco: Never Used    Alcohol use No    Drug use: No    Sexual activity: Not on file     Other Topics Concern    Not on file     Social History Narrative    Lives at home independently    Has 2 children        Has living will, DNR    Lynda Najera,                  ALLERGIES: Tramadol; Amitiza [lubiprostone]; Macrobid [nitrofurantoin monohyd/m-cryst]; Morphine; Other medication; and Sulfa (sulfonamide antibiotics)    Review of Systems   Constitutional: Positive for fatigue. Negative for chills and fever. HENT: Negative for rhinorrhea and sore throat. Eyes: Negative for visual disturbance. Respiratory: Negative for cough and shortness of breath. Cardiovascular: Negative for chest pain and leg swelling.    Gastrointestinal: Negative for abdominal pain, diarrhea, nausea and vomiting. Genitourinary: Negative for dysuria. Musculoskeletal: Negative for back pain and neck pain. Skin: Negative for rash. Neurological: Negative for weakness and headaches. Psychiatric/Behavioral: The patient is not nervous/anxious. Vitals:    04/23/18 1905   BP: 129/78   Pulse: 78   Resp: 16   Temp: 98.1 °F (36.7 °C)   SpO2: 97%   Weight: 56.7 kg (125 lb)   Height: 4' 11\" (1.499 m)            Physical Exam   Constitutional: She is oriented to person, place, and time. She appears well-developed and well-nourished. HENT:   Head: Normocephalic. Right Ear: External ear normal.   Left Ear: External ear normal.   Eyes: Conjunctivae and EOM are normal. Pupils are equal, round, and reactive to light. Neck: Normal range of motion. Neck supple. No tracheal deviation present. Cardiovascular: Normal rate, regular rhythm, normal heart sounds and intact distal pulses. No murmur heard. Pulmonary/Chest: Effort normal and breath sounds normal. No respiratory distress. She has no wheezes. She has no rales. Abdominal: Soft. She exhibits no distension. There is no tenderness. There is no rebound. Musculoskeletal: Normal range of motion. Neurological: She is alert and oriented to person, place, and time. No cranial nerve deficit. Cn 2-12 fully intact, strength and sensation 5/5 in all extremities. no focal deficits appreciated. Skin: No rash noted. Nursing note and vitals reviewed.        MDM  Number of Diagnoses or Management Options  Fatigue, unspecified type: new and requires workup     Amount and/or Complexity of Data Reviewed  Clinical lab tests: ordered and reviewed  Tests in the radiology section of CPT®: reviewed and ordered  Tests in the medicine section of CPT®: ordered and reviewed  Review and summarize past medical records: yes    Risk of Complications, Morbidity, and/or Mortality  Presenting problems: high  Diagnostic procedures: high  Management options: high    Patient Progress  Patient progress: stable        ED Course       Procedures  Recent Results (from the past 12 hour(s))   EKG, 12 LEAD, INITIAL    Collection Time: 04/23/18  7:11 PM   Result Value Ref Range    Ventricular Rate 84 BPM    Atrial Rate 84 BPM    P-R Interval 176 ms    QRS Duration 78 ms    Q-T Interval 364 ms    QTC Calculation (Bezet) 430 ms    Calculated P Axis 59 degrees    Calculated R Axis 62 degrees    Calculated T Axis 75 degrees    Diagnosis       !! AGE AND GENDER SPECIFIC ECG ANALYSIS !! Sinus rhythm  Otherwise normal ECG  When compared with ECG of 25-JAN-2018 19:03,    Confirmed by Joseph Tomlinson MD (), DON MILLS ([id number]) on 4/23/2018 8:39:36 PM     CBC WITH AUTOMATED DIFF    Collection Time: 04/23/18  7:19 PM   Result Value Ref Range    WBC 8.0 4.3 - 11.1 K/uL    RBC 3.96 (L) 4.05 - 5.25 M/uL    HGB 12.3 11.7 - 15.4 g/dL    HCT 36.6 35.8 - 46.3 %    MCV 92.4 79.6 - 97.8 FL    MCH 31.1 26.1 - 32.9 PG    MCHC 33.6 31.4 - 35.0 g/dL    RDW 13.7 11.9 - 14.6 %    PLATELET 315 956 - 090 K/uL    MPV 10.1 (L) 10.8 - 14.1 FL    DF AUTOMATED      NEUTROPHILS 73 43 - 78 %    LYMPHOCYTES 16 13 - 44 %    MONOCYTES 8 4.0 - 12.0 %    EOSINOPHILS 3 0.5 - 7.8 %    BASOPHILS 0 0.0 - 2.0 %    IMMATURE GRANULOCYTES 0 0.0 - 5.0 %    ABS. NEUTROPHILS 5.8 1.7 - 8.2 K/UL    ABS. LYMPHOCYTES 1.3 0.5 - 4.6 K/UL    ABS. MONOCYTES 0.6 0.1 - 1.3 K/UL    ABS. EOSINOPHILS 0.3 0.0 - 0.8 K/UL    ABS. BASOPHILS 0.0 0.0 - 0.2 K/UL    ABS. IMM.  GRANS. 0.0 0.0 - 0.5 K/UL   URINE MICROSCOPIC    Collection Time: 04/23/18  7:19 PM   Result Value Ref Range    WBC 0-3 0 /hpf    RBC 0 0 /hpf    Epithelial cells 0-3 0 /hpf    Bacteria 0 0 /hpf    Casts 9-67 0 /lpf   METABOLIC PANEL, COMPREHENSIVE    Collection Time: 04/23/18  8:51 PM   Result Value Ref Range    Sodium 136 136 - 145 mmol/L    Potassium 4.5 3.5 - 5.1 mmol/L    Chloride 102 98 - 107 mmol/L    CO2 27 21 - 32 mmol/L    Anion gap 7 7 - 16 mmol/L Glucose 108 (H) 65 - 100 mg/dL    BUN 32 (H) 8 - 23 MG/DL    Creatinine 1.02 (H) 0.6 - 1.0 MG/DL    GFR est AA >60 >60 ml/min/1.73m2    GFR est non-AA 55 (L) >60 ml/min/1.73m2    Calcium 9.7 8.3 - 10.4 MG/DL    Bilirubin, total 0.2 0.2 - 1.1 MG/DL    ALT (SGPT) 16 12 - 65 U/L    AST (SGOT) 13 (L) 15 - 37 U/L    Alk. phosphatase 105 50 - 136 U/L    Protein, total 7.1 6.3 - 8.2 g/dL    Albumin 3.4 3.2 - 4.6 g/dL    Globulin 3.7 (H) 2.3 - 3.5 g/dL    A-G Ratio 0.9 (L) 1.2 - 3.5     TROPONIN I    Collection Time: 04/23/18  8:51 PM   Result Value Ref Range    Troponin-I, Qt. <0.02 (L) 0.02 - 3.57 NG/ML   METABOLIC PANEL, COMPREHENSIVE    Collection Time: 04/23/18  8:51 PM   Result Value Ref Range    Sodium 136 136 - 145 mmol/L    Potassium 4.5 3.5 - 5.1 mmol/L    Chloride 102 98 - 107 mmol/L    CO2 28 21 - 32 mmol/L    Anion gap 6 (L) 7 - 16 mmol/L    Glucose 108 (H) 65 - 100 mg/dL    BUN 31 (H) 8 - 23 MG/DL    Creatinine 1.08 (H) 0.6 - 1.0 MG/DL    GFR est AA >60 >60 ml/min/1.73m2    GFR est non-AA 51 (L) >60 ml/min/1.73m2    Calcium 9.8 8.3 - 10.4 MG/DL    Bilirubin, total 0.2 0.2 - 1.1 MG/DL    ALT (SGPT) 15 12 - 65 U/L    AST (SGOT) 14 (L) 15 - 37 U/L    Alk. phosphatase 107 50 - 136 U/L    Protein, total 7.1 6.3 - 8.2 g/dL    Albumin 3.4 3.2 - 4.6 g/dL    Globulin 3.7 (H) 2.3 - 3.5 g/dL    A-G Ratio 0.9 (L) 1.2 - 3.5     MAGNESIUM    Collection Time: 04/23/18  8:51 PM   Result Value Ref Range    Magnesium 2.2 1.8 - 2.4 mg/dL   PHOSPHORUS    Collection Time: 04/23/18  8:51 PM   Result Value Ref Range    Phosphorus 4.1 (H) 2.3 - 3.7 MG/DL     Xr Chest Pa Lat    Result Date: 4/23/2018  CHEST X-RAY, 2 views. HISTORY:  Confusion. TECHNIQUE: PA and lateral views. COMPARISON: 25 January 2018. FINDINGS: The lungs are hyperinflated and clear. The heart size is normal. The costophrenic angles are sharp. The pulmonary vasculature is unremarkable. Included portion of the upper abdomen is unremarkable.  There is thoracic scoliosis. IMPRESSION: Negative for acute abnormality. Ct Head Wo Cont    Result Date: 4/23/2018  CT HEAD WITHOUT CONTRAST. INDICATION: Increasing lethargy. COMPARISON: 25 January 2018  TECHNIQUE:   5 mm axial scans from the skull base to the vertex. Our CT scanners use one or more of the following:  Automated exposure control, adjustment of the mA and or kV according to patient size, iterative reconstruction. FINDINGS:  No acute intraparenchymal hemorrhage or abnormal extra-axial fluid collection. The ventricles are normal size. There is symmetric volume loss. Extensive white matter low attenuation is present, nonspecific, likely chronic small vessel disease. No mass effect. Included portion of the paranasal sinuses and orbits grossly and straight fluid in the right maxillary sinus. .     IMPRESSION:  Negative for acute intracranial abnormality. Chronic changes. Right maxillary sinus fluid. 79 yo female with dementia and confusion:       Labs and imaging negative as above, vital signs stable, exam very reassuring. .  I discussed with patient and her daughter that observation in hospital may be beneficial to further evaluate however both prefer discharge at this time, will see PCP tomorrow for recheck or return with any further concerns or return of symptoms.   She is neurovascularly intact, very well appearing, NAD, slightly dehydrated so will give 500cc fluids although no more due to history of CHF and discharge back to facilities

## 2018-04-23 NOTE — ED TRIAGE NOTES
EMS states \"Patient complains of being a little more tired today. Staff at [facility] home [facility]) states she was a little more lethargic at the nurses station. Family said last time it was her colitis but she has no complaints at this time.   Patient was alert when we arrived\"

## 2018-04-24 LAB
ATRIAL RATE: 84 BPM
CALCULATED P AXIS, ECG09: 47 DEGREES
CALCULATED R AXIS, ECG10: 46 DEGREES
CALCULATED T AXIS, ECG11: 64 DEGREES
DIAGNOSIS, 93000: NORMAL
P-R INTERVAL, ECG05: 178 MS
Q-T INTERVAL, ECG07: 346 MS
QRS DURATION, ECG06: 76 MS
QTC CALCULATION (BEZET), ECG08: 408 MS
VENTRICULAR RATE, ECG03: 84 BPM

## 2018-04-24 NOTE — ED NOTES
I have reviewed discharge instructions with the caregiver. The caregiver verbalized understanding. Patient left ED via Discharge Method: stretcher to SNF with [facility]. Opportunity for questions and clarification provided. Patient given 0 scripts. To continue your aftercare when you leave the hospital, you may receive an automated call from our care team to check in on how you are doing. This is a free service and part of our promise to provide the best care and service to meet your aftercare needs.  If you have questions, or wish to unsubscribe from this service please call [phone]. Thank you for Choosing our [address] [place] [address] Emergency Department.

## 2018-04-24 NOTE — DISCHARGE INSTRUCTIONS
AS WE DISCUSSED, PLEASE SEE YOUR PRIMARY CARE PROVIDER TOMORROW FOR RECHECK, AND IF YOU DEVELOP RETURN OF FATIGUE OR ANY LOSS OF CONSCIOUSNESS, ANY SHORTNESS OF BREATH, ANY CHEST PAIN OR ANY FURTHER CONCERNS THEN PLEASE RETURN IMMEDIATELY TO THE EMERGENCY DEPARTMENT. Fatigue: Care Instructions  Your Care Instructions    Fatigue is a feeling of tiredness, exhaustion, or lack of energy. You may feel fatigue because of too much or not enough activity. It can also come from stress, lack of sleep, boredom, and poor diet. Many medical problems, such as viral infections, can cause fatigue. Emotional problems, especially depression, are often the cause of fatigue. Fatigue is most often a symptom of another problem. Treatment for fatigue depends on the cause. For example, if you have fatigue because you have a certain health problem, treating this problem also treats your fatigue. If depression or anxiety is the cause, treatment may help. Follow-up care is a key part of your treatment and safety. Be sure to make and go to all appointments, and call your doctor if you are having problems. It's also a good idea to know your test results and keep a list of the medicines you take. How can you care for yourself at home? · Get regular exercise. But don't overdo it. Go back and forth between rest and exercise. · Get plenty of rest.  · Eat a healthy diet. Do not skip meals, especially breakfast.  · Reduce your use of caffeine, tobacco, and alcohol. Caffeine is most often found in coffee, tea, cola drinks, and chocolate. · Limit medicines that can cause fatigue. This includes tranquilizers and cold and allergy medicines. When should you call for help? Watch closely for changes in your health, and be sure to contact your doctor if:  ? · You have new symptoms such as fever or a rash. ? · Your fatigue gets worse. ? · You have been feeling down, depressed, or hopeless.  Or you may have lost interest in things that you usually enjoy. ? · You are not getting better as expected. Where can you learn more? Go to http://steven-prachi.info/. Enter Z908 in the search box to learn more about \"Fatigue: Care Instructions. \"  Current as of: March 20, 2017  Content Version: 11.4  © 2533-4372 [employer]. Care instructions adapted under license by [employer] (which disclaims liability or warranty for this information). If you have questions about a medical condition or this instruction, always ask your healthcare professional. [address] any warranty or liability for your use of this information.

## 2018-05-13 ENCOUNTER — HOSPITAL ENCOUNTER (OUTPATIENT)
Dept: LAB | Age: 83
Discharge: HOME | End: 2018-05-13

## 2018-05-13 LAB
ANION GAP SERPL CALC-SCNC: 9 MMOL/L (ref 7–16)
BASOPHILS # BLD: 0 K/UL (ref 0–0.2)
BASOPHILS NFR BLD: 0 % (ref 0–2)
BUN SERPL-MCNC: 33 MG/DL (ref 8–23)
CALCIUM SERPL-MCNC: 9.4 MG/DL (ref 8.3–10.4)
CHLORIDE SERPL-SCNC: 102 MMOL/L (ref 98–107)
CO2 SERPL-SCNC: 26 MMOL/L (ref 21–32)
CREAT SERPL-MCNC: 1.03 MG/DL (ref 0.6–1)
DIFFERENTIAL METHOD BLD: ABNORMAL
EOSINOPHIL # BLD: 0.2 K/UL (ref 0–0.8)
EOSINOPHIL NFR BLD: 2 % (ref 0.5–7.8)
ERYTHROCYTE [DISTWIDTH] IN BLOOD BY AUTOMATED COUNT: 13.6 % (ref 11.9–14.6)
GLUCOSE SERPL-MCNC: 108 MG/DL (ref 65–100)
HCT VFR BLD AUTO: 34.7 % (ref 35.8–46.3)
HGB BLD-MCNC: 11.2 G/DL (ref 11.7–15.4)
IMM GRANULOCYTES # BLD: 0 K/UL (ref 0–0.5)
IMM GRANULOCYTES NFR BLD AUTO: 0 % (ref 0–5)
LYMPHOCYTES # BLD: 1.3 K/UL (ref 0.5–4.6)
LYMPHOCYTES NFR BLD: 15 % (ref 13–44)
MCH RBC QN AUTO: 30.5 PG (ref 26.1–32.9)
MCHC RBC AUTO-ENTMCNC: 32.3 G/DL (ref 31.4–35)
MCV RBC AUTO: 94.6 FL (ref 79.6–97.8)
MONOCYTES # BLD: 0.7 K/UL (ref 0.1–1.3)
MONOCYTES NFR BLD: 8 % (ref 4–12)
NEUTS SEG # BLD: 6.3 K/UL (ref 1.7–8.2)
NEUTS SEG NFR BLD: 75 % (ref 43–78)
PLATELET # BLD AUTO: 289 K/UL (ref 150–450)
PMV BLD AUTO: 9.8 FL (ref 10.8–14.1)
POTASSIUM SERPL-SCNC: 4.5 MMOL/L (ref 3.5–5.1)
RBC # BLD AUTO: 3.67 M/UL (ref 4.05–5.25)
SODIUM SERPL-SCNC: 137 MMOL/L (ref 136–145)
WBC # BLD AUTO: 8.4 K/UL (ref 4.3–11.1)

## 2018-05-13 PROCEDURE — 80048 BASIC METABOLIC PNL TOTAL CA: CPT | Performed by: HOSPITALIST

## 2018-05-13 PROCEDURE — 85025 COMPLETE CBC W/AUTO DIFF WBC: CPT | Performed by: HOSPITALIST

## 2018-09-07 ENCOUNTER — APPOINTMENT (OUTPATIENT)
Dept: CT IMAGING | Age: 83
End: 2018-09-07
Attending: EMERGENCY MEDICINE
Payer: COMMERCIAL

## 2018-09-07 ENCOUNTER — HOSPITAL ENCOUNTER (EMERGENCY)
Age: 83
Discharge: HOME | End: 2018-09-07
Attending: EMERGENCY MEDICINE
Payer: COMMERCIAL

## 2018-09-07 VITALS
BODY MASS INDEX: 23.39 KG/M2 | OXYGEN SATURATION: 96 % | RESPIRATION RATE: 16 BRPM | SYSTOLIC BLOOD PRESSURE: 162 MMHG | HEART RATE: 71 BPM | WEIGHT: 116 LBS | DIASTOLIC BLOOD PRESSURE: 76 MMHG | HEIGHT: 59 IN | TEMPERATURE: 97.4 F

## 2018-09-07 DIAGNOSIS — N30.00 ACUTE CYSTITIS WITHOUT HEMATURIA: Primary | ICD-10-CM

## 2018-09-07 DIAGNOSIS — R55 SYNCOPE AND COLLAPSE: ICD-10-CM

## 2018-09-07 LAB
ALBUMIN SERPL-MCNC: 2.9 G/DL (ref 3.2–4.6)
ALBUMIN/GLOB SERPL: 0.8 {RATIO} (ref 1.2–3.5)
ALP SERPL-CCNC: 105 U/L (ref 50–136)
ALT SERPL-CCNC: 15 U/L (ref 12–65)
ANION GAP SERPL CALC-SCNC: 8 MMOL/L (ref 7–16)
AST SERPL-CCNC: 15 U/L (ref 15–37)
ATRIAL RATE: 74 BPM
BACTERIA URNS QL MICRO: ABNORMAL /HPF
BASOPHILS # BLD: 0 K/UL (ref 0–0.2)
BASOPHILS NFR BLD: 0 % (ref 0–2)
BILIRUB SERPL-MCNC: 0.2 MG/DL (ref 0.2–1.1)
BUN SERPL-MCNC: 32 MG/DL (ref 8–23)
CALCIUM SERPL-MCNC: 8.6 MG/DL (ref 8.3–10.4)
CALCULATED P AXIS, ECG09: 44 DEGREES
CALCULATED R AXIS, ECG10: 53 DEGREES
CALCULATED T AXIS, ECG11: 69 DEGREES
CASTS URNS QL MICRO: ABNORMAL /LPF
CHLORIDE SERPL-SCNC: 108 MMOL/L (ref 98–107)
CO2 SERPL-SCNC: 22 MMOL/L (ref 21–32)
CREAT SERPL-MCNC: 1.01 MG/DL (ref 0.6–1)
DIAGNOSIS, 93000: NORMAL
DIFFERENTIAL METHOD BLD: ABNORMAL
EOSINOPHIL # BLD: 0.2 K/UL (ref 0–0.8)
EOSINOPHIL NFR BLD: 3 % (ref 0.5–7.8)
EPI CELLS #/AREA URNS HPF: ABNORMAL /HPF
ERYTHROCYTE [DISTWIDTH] IN BLOOD BY AUTOMATED COUNT: 12.6 %
GLOBULIN SER CALC-MCNC: 3.5 G/DL (ref 2.3–3.5)
GLUCOSE SERPL-MCNC: 108 MG/DL (ref 65–100)
HCT VFR BLD AUTO: 33 % (ref 35.8–46.3)
HGB BLD-MCNC: 10.4 G/DL (ref 11.7–15.4)
IMM GRANULOCYTES # BLD: 0 K/UL (ref 0–0.5)
IMM GRANULOCYTES NFR BLD AUTO: 1 % (ref 0–5)
LYMPHOCYTES # BLD: 0.8 K/UL (ref 0.5–4.6)
LYMPHOCYTES NFR BLD: 11 % (ref 13–44)
MCH RBC QN AUTO: 31 PG (ref 26.1–32.9)
MCHC RBC AUTO-ENTMCNC: 31.5 G/DL (ref 31.4–35)
MCV RBC AUTO: 98.5 FL (ref 79.6–97.8)
MONOCYTES # BLD: 0.4 K/UL (ref 0.1–1.3)
MONOCYTES NFR BLD: 6 % (ref 4–12)
NEUTS SEG # BLD: 5.7 K/UL (ref 1.7–8.2)
NEUTS SEG NFR BLD: 79 % (ref 43–78)
NRBC # BLD: 0 K/UL (ref 0–0.2)
P-R INTERVAL, ECG05: 176 MS
PLATELET # BLD AUTO: 266 K/UL (ref 150–450)
PMV BLD AUTO: 9.6 FL (ref 9.4–12.3)
POTASSIUM SERPL-SCNC: 4.3 MMOL/L (ref 3.5–5.1)
PROT SERPL-MCNC: 6.4 G/DL (ref 6.3–8.2)
Q-T INTERVAL, ECG07: 386 MS
QRS DURATION, ECG06: 72 MS
QTC CALCULATION (BEZET), ECG08: 428 MS
RBC # BLD AUTO: 3.35 M/UL (ref 4.05–5.2)
RBC #/AREA URNS HPF: ABNORMAL /HPF
SODIUM SERPL-SCNC: 138 MMOL/L (ref 136–145)
TROPONIN I BLD-MCNC: 0 NG/ML (ref 0.02–0.05)
TROPONIN I SERPL-MCNC: <0.02 NG/ML (ref 0.02–0.05)
VENTRICULAR RATE, ECG03: 74 BPM
WBC # BLD AUTO: 7.2 K/UL (ref 4.3–11.1)
WBC URNS QL MICRO: >100 /HPF

## 2018-09-07 PROCEDURE — 87086 URINE CULTURE/COLONY COUNT: CPT

## 2018-09-07 PROCEDURE — 80053 COMPREHEN METABOLIC PANEL: CPT

## 2018-09-07 PROCEDURE — 99285 EMERGENCY DEPT VISIT HI MDM: CPT | Performed by: EMERGENCY MEDICINE

## 2018-09-07 PROCEDURE — 81003 URINALYSIS AUTO W/O SCOPE: CPT | Performed by: EMERGENCY MEDICINE

## 2018-09-07 PROCEDURE — 74011000258 HC RX REV CODE- 258: Performed by: EMERGENCY MEDICINE

## 2018-09-07 PROCEDURE — 85025 COMPLETE CBC W/AUTO DIFF WBC: CPT

## 2018-09-07 PROCEDURE — 70450 CT HEAD/BRAIN W/O DYE: CPT

## 2018-09-07 PROCEDURE — 84484 ASSAY OF TROPONIN QUANT: CPT

## 2018-09-07 PROCEDURE — 51701 INSERT BLADDER CATHETER: CPT | Performed by: EMERGENCY MEDICINE

## 2018-09-07 PROCEDURE — 74011250636 HC RX REV CODE- 250/636: Performed by: EMERGENCY MEDICINE

## 2018-09-07 PROCEDURE — 96365 THER/PROPH/DIAG IV INF INIT: CPT | Performed by: EMERGENCY MEDICINE

## 2018-09-07 PROCEDURE — 87088 URINE BACTERIA CULTURE: CPT

## 2018-09-07 PROCEDURE — 77030011943

## 2018-09-07 PROCEDURE — 81015 MICROSCOPIC EXAM OF URINE: CPT

## 2018-09-07 PROCEDURE — 93005 ELECTROCARDIOGRAM TRACING: CPT | Performed by: EMERGENCY MEDICINE

## 2018-09-07 PROCEDURE — 87186 SC STD MICRODIL/AGAR DIL: CPT

## 2018-09-07 RX ORDER — CEPHALEXIN 500 MG/1
500 CAPSULE ORAL 4 TIMES DAILY
Qty: 28 CAP | Refills: 0 | Status: SHIPPED | OUTPATIENT
Start: 2018-09-07 | End: 2018-09-14

## 2018-09-07 RX ADMIN — CEFTRIAXONE SODIUM 1 G: 1 INJECTION, POWDER, FOR SOLUTION INTRAMUSCULAR; INTRAVENOUS at 15:20

## 2018-09-07 NOTE — ED NOTES
I have reviewed discharge instructions with the patient and caregiver. The patient and caregiver verbalized understanding. Patient left ED via Discharge Method: stretcher to SNF with transport from [facility]. The patient has been provided discharge instructions, prescription, and follow up information. The patient and daughter in law do not have any questions at this time. Patient is awaiting transport back to [facility] at this time and appears in no acute distress. Opportunity for questions and clarification provided. Patient given 1 scripts. To continue your aftercare when you leave the hospital, you may receive an automated call from our care team to check in on how you are doing. This is a free service and part of our promise to provide the best care and service to meet your aftercare needs.  If you have questions, or wish to unsubscribe from this service please call [phone]. Thank you for Choosing our [facility] Emergency Department.

## 2018-09-07 NOTE — ED PROVIDER NOTES
HPI Comments: Patient is an 51-year-old female presenting with possible syncopal episode earlier today. Patient is a resident of [facility]. Daughter believes that the patient was given some type of laxative earlier today for constipation. When they got to get her off the toilet she had  Reported syncopal episode followed by a second episode. When EMS arrived was found with pressure was low and she was given 1 L fluid. Currently she is reporting improvement. She states earlier she had some blurry vision which has resolved. Patient is a 80 y.o. female presenting with syncope. The history is provided by the patient. No  was used. Syncope Pertinent negatives include no confusion, no fever, no abdominal pain, no nausea, no vomiting, no congestion, no headaches and no back pain. Her past medical history is significant for syncope. Past Medical History:  
Diagnosis Date  Acute myocardial infarction of other anterior wall, initial episode of care ([address] 8/30/2012 MI - Had a stent placed August 31,2012  Acute systolic congestive heart failure- in setting of anterior MI, EF 35-40% 8/30/2012  Acute systolic heart failure ([address]   
 resolved at discharge, EF 35% at time of MI, 55% by echo prior to discharge  ARF (acute renal failure) ([address] 9/29/2012  Arthritis  CAD (coronary artery disease) MI  
 Carotid artery stenosis without cerebral infarction  Chronic pain- chronic narcotic use for spinal stenosis 8/30/2012  Coronary atherosclerosis of native coronary artery 3/16/2013  Diarrhea 10/2/2012  Dyslipidemia 8/30/2012  Dyspnea   
 unspecified  Essential hypertension, benign 8/30/2012  Heart failure ([address] denies heart failure  Hyperlipidemia   
 other unsp dyslipidemia  Hypertension   
 controlled, benign  Ischemic colitis ([address]  Lower GI bleed 3/12/2015  Myocardial infarction, anterolateral wall, subsequent care [facility])   
 s/p 2.5mm Xience stent to focal lesion in mid LAD 8/2012  N&V (nausea and vomiting) 3/16/2013  Sepsis ([facility] 2/12/2014  Spinal stenosis 8/30/2012  ST elevation (STEMI) myocardial infarction involving left anterior descending coronary artery ([facility]  Syncope and collapse 2/12/2014 Past Surgical History:  
Procedure Laterality Date  CARDIAC SURG PROCEDURE UNLIST    
 stent  HX APPENDECTOMY  HX CHOLECYSTECTOMY  HX CORONARY STENT PLACEMENT    
 2012 for MI  
 HX GYN    
 hysterectomy  HX ORTHOPAEDIC    
 moreno knees, ankle and spinal cord stimulator  HX OTHER SURGICAL    
 cord stimulator for pain control removed Family History:  
Problem Relation Age of Onset  Hypertension Mother  Stroke Mother  Hypertension Father Social History Social History  Marital status:  Spouse name: N/A  
 Number of children: N/A  
 Years of education: N/A Occupational History  Not on file. Social History Main Topics  Smoking status: Never Smoker  Smokeless tobacco: Never Used  Alcohol use No  
 Drug use: No  
 Sexual activity: Not on file Other Topics Concern  Not on file Social History Narrative Lives at home independently Has 2 children  Has living will, DNR Hanna Najera,   
   
   
 
 
 
ALLERGIES: Tramadol; Amitiza [lubiprostone]; Macrobid [nitrofurantoin monohyd/m-cryst]; Morphine; Other medication; and Sulfa (sulfonamide antibiotics) Review of Systems Constitutional: Negative for fatigue and fever. HENT: Negative for congestion. Respiratory: Negative for cough and shortness of breath. Cardiovascular: Positive for syncope. Gastrointestinal: Negative for abdominal pain, nausea and vomiting. Genitourinary: Negative for dysuria. Musculoskeletal: Negative for back pain. Neurological: Positive for syncope. Negative for headaches. Psychiatric/Behavioral: Negative for confusion. Vitals:  
 09/07/18 1141 09/07/18 1213 BP: 105/66 111/73 Pulse: 76 91 Resp: 16 16 Temp: 97.4 °F (36.3 °C) SpO2: 94% 97% Weight: 52.6 kg (116 lb) Height: 4' 11\" (1.499 m) Physical Exam  
Constitutional: She is oriented to person, place, and time. She appears well-developed and well-nourished. No distress. HENT:  
Head: Normocephalic and atraumatic. Eyes: Conjunctivae and EOM are normal. Pupils are equal, round, and reactive to light. Neck: Normal range of motion. Neck supple. Cardiovascular: Normal rate, regular rhythm and normal heart sounds. Pulmonary/Chest: Effort normal and breath sounds normal. No respiratory distress. She has no wheezes. She has no rales. Abdominal: Soft. She exhibits no distension. There is no tenderness. There is no rebound. Musculoskeletal: Normal range of motion. She exhibits no edema or tenderness. Neurological: She is alert and oriented to person, place, and time. Symmetric smile. Clear speech. Moving all extremities. daughter states she is mildly confused Skin: Skin is warm and dry. No rash noted. She is not diaphoretic. Psychiatric: She has a normal mood and affect. Her behavior is normal.  
Vitals reviewed. MDM Number of Diagnoses or Management Options Acute cystitis without hematuria: new and does not require workup Syncope and collapse: new and does not require workup Diagnosis management comments: No evidence of hypotension in the emergency department. No signs/symptoms of sepsis. She does have a urinary tract infection. Patient given ceftriaxone here in the emergency department. Discussed admission versus discharge with patient and family. Patient would very much like to go back to [facility]. Daughter says she seems slightly confused the patient is oriented to person place time situation. We'll discharge with Keflex. Return precautions discussed. Discharged in stable and improved condition. Patient and family in agreement with plan. Brain Morgan MD; 9/7/2018 @3:34 PM Voice dictation software was used during the making of this note. This software is not perfect and grammatical and other typographical errors may be present. This note has not been proofread for errors. 
=================================================================== Amount and/or Complexity of Data Reviewed Clinical lab tests: reviewed and ordered (Results for orders placed or performed during the hospital encounter of 09/07/18 
-CBC WITH AUTOMATED DIFF Result                                            Value                         Ref Range WBC                                               7.2                           4.3 - 11.1 K/uL           
     RBC                                               3.35 (L)                      4.05 - 5.2 M/uL HGB                                               10.4 (L)                      11.7 - 15.4 g/dL HCT                                               33.0 (L)                      35.8 - 46.3 % MCV                                               98.5 (H)                      79.6 - 97.8 FL            
     MCH                                               31.0                          26.1 - 32.9 PG            
     MCHC                                              31.5                          31.4 - 35.0 g/dL RDW                                               12.6                          % PLATELET                                          266                           150 - 450 K/uL      MPV                                               9.6                           9.4 - 12.3 FL             
 ABSOLUTE NRBC                                     0.00                          0.0 - 0.2 K/uL            
     DF                                                AUTOMATED NEUTROPHILS                                       79 (H)                        43 - 78 % LYMPHOCYTES                                       11 (L)                        13 - 44 % MONOCYTES                                         6                             4.0 - 12.0 % EOSINOPHILS                                       3                             0.5 - 7.8 % BASOPHILS                                         0                             0.0 - 2.0 % IMMATURE GRANULOCYTES                             1                             0.0 - 5.0 %               
     ABS. NEUTROPHILS                                  5.7                           1.7 - 8.2 K/UL            
     ABS. LYMPHOCYTES                                  0.8                           0.5 - 4.6 K/UL            
     ABS. MONOCYTES                                    0.4                           0.1 - 1.3 K/UL            
     ABS. EOSINOPHILS                                  0.2                           0.0 - 0.8 K/UL            
     ABS. BASOPHILS                                    0.0                           0.0 - 0.2 K/UL            
     ABS. IMM. GRANS.                                  0.0                           0.0 - 0.5 K/UL            
-METABOLIC PANEL, COMPREHENSIVE Result                                            Value                         Ref Range Sodium                                            138                           136 - 145 mmol/L      Potassium                                         4.3                           3.5 - 5.1 mmol/L          
 Chloride                                          108 (H)                       98 - 107 mmol/L           
     CO2                                               22                            21 - 32 mmol/L Anion gap                                         8                             7 - 16 mmol/L Glucose                                           108 (H)                       65 - 100 mg/dL BUN                                               32 (H)                        8 - 23 MG/DL Creatinine                                        1.01 (H)                      0.6 - 1.0 MG/DL           
     GFR est AA                                        >60                           >60 ml/min/1.73m2 GFR est non-AA                                    55 (L)                        >60 ml/min/1.73m2 Calcium                                           8.6                           8.3 - 10.4 MG/DL Bilirubin, total                                  0.2                           0.2 - 1.1 MG/DL           
     ALT (SGPT)                                        15                            12 - 65 U/L               
     AST (SGOT)                                        15                            15 - 37 U/L Alk. phosphatase                                  105                           50 - 136 U/L Protein, total                                    6.4                           6.3 - 8.2 g/dL Albumin                                           2.9 (L)                       3.2 - 4.6 g/dL Globulin                                          3.5                           2.3 - 3.5 g/dL      A-G Ratio                                         0.8 (L)                       1.2 - 3.5                 
-TROPONIN I 
 Result                                            Value                         Ref Range Troponin-I, Qt.                                   <0.02 (L)                     0.02 - 0.05 NG/ML         
-URINE MICROSCOPIC Result                                            Value                         Ref Range WBC                                               >100 (H)                      0 /hpf                    
     RBC                                               0-3                           0 /hpf Epithelial cells                                  0-3                           0 /hpf Bacteria                                          4+ (H)                        0 /hpf Casts                                             0-3                           0 /lpf                    
-POC TROPONIN-I Result                                            Value                         Ref Range Troponin-I (POC)                                  0 (L)                         0.02 - 0.05 ng/ml         
-EKG, 12 LEAD, INITIAL Result                                            Value                         Ref Range Ventricular Rate                                  74                            BPM                       
     Atrial Rate                                       74                            BPM                       
     P-R Interval                                      176                           ms                        
     QRS Duration                                      72                            ms      Q-T Interval                                      386                           ms                        
     QTC Calculation (Bezet)                           428 ms                        
     Calculated P Axis                                 44                            degrees Calculated R Axis                                 53                            degrees Calculated T Axis                                 69                            degrees Diagnosis Normal sinus rhythm Nonspecific ST abnormality Abnormal ECG When compared with ECG of 23-APR-2018 21:33, No significant change was found ) Tests in the radiology section of CPT®: ordered and reviewed (Ct Head Wo Cont Result Date: 9/7/2018 CT Brain dated 9/7/2018  Comparison: 4/23/2018 Clinical Information:  Syncope  5 mm axial images were obtained from skull base to vertex without contrast. Radiation dose reduction techniques were used for this study. Our scanners use one or all of the following:  Automated exposure control, adjustment of the mA and/or kV according to patient size, iterative reconstruction. Findings: Atrophy is present. No midline shift. Ill-defined hypodensity throughout the cerebral white matter is consistent chronic ischemic white matter change. No hemorrhage, mass, mass effect or acute territorial infarction. No extra-axial abnormality. No skull fracture. Mastoid air cells and visualized paranasal sinuses are aerated and unremarkable. Impression: Chronic appearing changes. No acute abnormality  
 
) Review and summarize past medical records: yes Independent visualization of images, tracings, or specimens: yes Risk of Complications, Morbidity, and/or Mortality Presenting problems: high Diagnostic procedures: moderate Management options: moderate Patient Progress Patient progress: improved ED Course Procedures

## 2018-09-07 NOTE — ED TRIAGE NOTES
Pt reports while being in the waiting room that her vision is getting blurred. Pt brought back to triage for a 2nd vital check. Pt is fully alert at time of triage.

## 2018-09-07 NOTE — ED TRIAGE NOTES
Pt arrived via. Ems states she has a syncopal episode while going to the bathroom. They pt did not fall to the floor because the staff held her up. initally the blood pressure was 82 systolic so they gave her a 1 l of ns and last pressure was 489 systolic. The staff states they were taking the pt to the bathroom because they gave her a laxative. The daughter in law says this happens every time they give her a laxative with out giving her fluids. Pt states having back pain.

## 2018-09-07 NOTE — ED NOTES
In and out catheter used to obtain urine sample. Patient has been placed in clean brief at this time. Patient tolerated procedure well. Cycling vitals in place. Will continue to monitor.

## 2018-09-07 NOTE — DISCHARGE INSTRUCTIONS
Urinary Tract Infection in Women: Care Instructions  Your Care Instructions    A urinary tract infection, or UTI, is a general term for an infection anywhere between the kidneys and the urethra (where urine comes out). Most UTIs are bladder infections. They often cause pain or burning when you urinate. UTIs are caused by bacteria and can be cured with antibiotics. Be sure to complete your treatment so that the infection goes away. Follow-up care is a key part of your treatment and safety. Be sure to make and go to all appointments, and call your doctor if you are having problems. It's also a good idea to know your test results and keep a list of the medicines you take. How can you care for yourself at home? · Take your antibiotics as directed. Do not stop taking them just because you feel better. You need to take the full course of antibiotics. · Drink extra water and other fluids for the next day or two. This may help wash out the bacteria that are causing the infection. (If you have kidney, heart, or liver disease and have to limit fluids, talk with your doctor before you increase your fluid intake.)  · Avoid drinks that are carbonated or have caffeine. They can irritate the bladder. · Urinate often. Try to empty your bladder each time. · To relieve pain, take a hot bath or lay a heating pad set on low over your lower belly or genital area. Never go to sleep with a heating pad in place. To prevent UTIs  · Drink plenty of water each day. This helps you urinate often, which clears bacteria from your system. (If you have kidney, heart, or liver disease and have to limit fluids, talk with your doctor before you increase your fluid intake.)  · Urinate when you need to. · Urinate right after you have sex. · Change sanitary pads often. · Avoid douches, bubble baths, feminine hygiene sprays, and other feminine hygiene products that have deodorants.   · After going to the bathroom, wipe from front to back.  When should you call for help? Call your doctor now or seek immediate medical care if:    · Symptoms such as fever, chills, nausea, or vomiting get worse or appear for the first time.     · You have new pain in your back just below your rib cage. This is called flank pain.     · There is new blood or pus in your urine.     · You have any problems with your antibiotic medicine.    Watch closely for changes in your health, and be sure to contact your doctor if:    · You are not getting better after taking an antibiotic for 2 days.     · Your symptoms go away but then come back. Where can you learn more? Go to http://steven-prachi.info/. Enter G199 in the search box to learn more about \"Urinary Tract Infection in Women: Care Instructions. \"  Current as of: May 12, 2017  Content Version: 11.7  © 7772[address]16 [employer]. Care instructions adapted under license by [employer] (which disclaims liability or warranty for this information). If you have questions about a medical condition or this instruction, always ask your healthcare professional. [address] any warranty or liability for your use of this information.

## 2018-09-07 NOTE — ED NOTES
Patient brought by ems, patient had laxative today, per visitor patient had emesis and past out while trying to have a bowel movement.

## 2018-09-10 LAB
BACTERIA SPEC CULT: ABNORMAL
BACTERIA SPEC CULT: ABNORMAL
SERVICE CMNT-IMP: ABNORMAL

## 2018-09-10 NOTE — ED NOTES
EMERGENCY DEPARTMENT HISTORY AND PHYSICAL EXAM 
 
Date: 9/10/2018 Patient Name: Nilson Norwood History of Presenting Illness Chief Complaint Patient presents with  Sore Throat  Cough History Provided By: Patient Chief Complaint: Sore throat Duration: 3-4 Days Timing:  Acute Location: Throat Quality: Sore Severity: 7 out of 10 Associated Symptoms: productive yellow cough and chest pain secondary to cough Additional History (Context):  
11:03 AM 
Nilson Norwood is a 32 y.o. male with no significant PMHx who presents to the emergency department C/O sore throat (7/10) onset 3-4 days ago. Associated sxs include productive yellow cough and chest pain secondary to cough. Pt denies fever, chills, SOB, ear pain, sick contacts, tobacco use, and any other sxs or complaints. PCP: None Current Outpatient Prescriptions Medication Sig Dispense Refill  oxyCODONE-acetaminophen (PERCOCET) 5-325 mg per tablet Take 1 Tab by mouth every four (4) hours as needed for Pain. Max Daily Amount: 6 Tabs. 10 Tab 0  ibuprofen (MOTRIN) 600 mg tablet Take 1 Tab by mouth every six (6) hours as needed for Pain. 20 Tab 0 Past History Past Medical History: No past medical history on file. Past Surgical History: No past surgical history on file. Family History: No family history on file. Social History: 
Social History Substance Use Topics  Smoking status: Never Smoker  Smokeless tobacco: Never Used  Alcohol use Yes Comment: rare Allergies: Allergies Allergen Reactions  Bee Venom Protein (Honey Bee) Anaphylaxis Review of Systems Review of Systems Constitutional: Negative for chills and fever. HENT: Positive for sore throat. Negative for ear pain. Respiratory: Positive for cough (productive yellow). Negative for shortness of breath. Cardiovascular: Positive for chest pain (secondary to cough). Pt spO2 fell below 81.  Pt put on 4L NC. All other systems reviewed and are negative. Physical Exam  
 
Vitals:  
 09/10/18 1051 BP: 127/70 Pulse: 71 Resp: 18 Temp: 98.2 °F (36.8 °C) SpO2: 98% Weight: 68.5 kg (151 lb) Height: 5' 10\" (1.778 m) Physical Exam  
Constitutional: He is oriented to person, place, and time. He appears well-developed and well-nourished. HENT:  
Head: Normocephalic. Ears: 
 
Nose: Nose normal.  
Mouth/Throat:  
 
 
Eyes: Conjunctivae are normal.  
Neck:  
No rigidity Cardiovascular: Normal rate and regular rhythm. Pulmonary/Chest: Effort normal and breath sounds normal.  
Abdominal: Soft. Bowel sounds are normal.  
Musculoskeletal: Normal range of motion. Neurological: He is alert and oriented to person, place, and time. Skin: Skin is warm and dry. Nursing note and vitals reviewed. Diagnostic Study Results Labs - No results found for this or any previous visit (from the past 12 hour(s)). Radiologic Studies -  
XR CHEST PA LAT Final Result IMPRESSION: No acute radiographic cardiopulmonary abnormality As read by the radiologist.  
 
CT Results  (Last 48 hours) None CXR Results  (Last 48 hours) 09/10/18 1131  XR CHEST PA LAT Final result Impression:  IMPRESSION: No acute radiographic cardiopulmonary abnormality Narrative:  EXAM:  XR CHEST PA LAT INDICATION:   Cough with sore throat COMPARISON: None. FINDINGS: PA and lateral radiographs of the chest demonstrate no focal pneumonic  
consolidation, pneumothorax, or pleural effusion. The cardiac and mediastinal  
contours and pulmonary vascularity are normal.  No acute osseous abnormality. Medications given in the ED- Medications  
dexamethasone (DECADRON) 4 mg/mL injection 10 mg (10 mg Oral Given 9/10/18 1122) lidocaine (XYLOCAINE) 2 % viscous solution 15 mL (15 mL Mouth/Throat Given 9/10/18 1122) Medical Decision Making I am the first provider for this patient. I reviewed the vital signs, available nursing notes, past medical history, past surgical history, family history and social history. Vital Signs-Reviewed the patient's vital signs. Pulse Oximetry Analysis - 98% on RA Records Reviewed: Nursing Notes and Old Medical Records Provider Notes (Medical Decision Making): 32 y.o male presents with sore throat and productive cough. Exam is not concerning. He felt much better after medications given. Strep and chest xray negative. Most likely viral. He will continue Tylenol and Motrin, he understands reasons to return and is offering no questions or concerns at this time Procedures: 
Procedures ED Course:  
11:03 AM Initial assessment performed. The patients presenting problems have been discussed, and they are in agreement with the care plan formulated and outlined with them. I have encouraged them to ask questions as they arise throughout their visit. 11:49 AM Pt updated on all results. Does feel better after medications given. Understands reasons to return. Is offering no questions or concerns. Diagnosis and Disposition DISCHARGE NOTE: 
11:57 AM 
Nilson Mcgrath's  results have been reviewed with him. He has been counseled regarding his diagnosis, treatment, and plan. He verbally conveys understanding and agreement of the signs, symptoms, diagnosis, treatment and prognosis and additionally agrees to follow up as discussed. He also agrees with the care-plan and conveys that all of his questions have been answered. I have also provided discharge instructions for him that include: educational information regarding their diagnosis and treatment, and list of reasons why they would want to return to the ED prior to their follow-up appointment, should his condition change. He has been provided with education for proper emergency department utilization. CLINICAL IMPRESSION: 
 
1. Sore throat 2. Cough PLAN: 
1. D/C Home 2. Current Discharge Medication List  
  
 
3. Follow-up Information Follow up With Details Comments Contact Info Dax Plascencia MD Schedule an appointment as soon as possible for a visit in 3 days For primary care follow up. [address] [address] 
[phone] [facility] EMERGENCY DEPT Go to As needed, If symptoms worsen [address] [facility] [id number] 
[phone]  
  
 
_______________________________ Attestations: This note is prepared by Simi Barrientos, acting as Scribe for Charli LUIS. Charli LUIS:  The scribe's documentation has been prepared under my direction and personally reviewed by me in its entirety. I confirm that the note above accurately reflects all work, treatment, procedures, and medical decision making performed by me. 
_______________________________

## 2018-11-04 ENCOUNTER — APPOINTMENT (OUTPATIENT)
Dept: GENERAL RADIOLOGY | Age: 83
End: 2018-11-04
Attending: EMERGENCY MEDICINE
Payer: MEDICARE

## 2018-11-04 ENCOUNTER — HOSPITAL ENCOUNTER (EMERGENCY)
Age: 83
Discharge: HOME | End: 2018-11-04
Attending: EMERGENCY MEDICINE
Payer: MEDICARE

## 2018-11-04 VITALS
TEMPERATURE: 97.6 F | DIASTOLIC BLOOD PRESSURE: 54 MMHG | SYSTOLIC BLOOD PRESSURE: 121 MMHG | BODY MASS INDEX: 25.4 KG/M2 | WEIGHT: 126 LBS | OXYGEN SATURATION: 94 % | HEIGHT: 59 IN | RESPIRATION RATE: 16 BRPM | HEART RATE: 75 BPM

## 2018-11-04 DIAGNOSIS — R53.83 FATIGUE, UNSPECIFIED TYPE: Primary | ICD-10-CM

## 2018-11-04 LAB
ALBUMIN SERPL-MCNC: 3.3 G/DL (ref 3.2–4.6)
ALBUMIN/GLOB SERPL: 0.8 {RATIO} (ref 1.2–3.5)
ALP SERPL-CCNC: 119 U/L (ref 50–136)
ALT SERPL-CCNC: 16 U/L (ref 12–65)
ANION GAP SERPL CALC-SCNC: 6 MMOL/L (ref 7–16)
APTT PPP: 31.3 SEC (ref 23.2–35.3)
AST SERPL-CCNC: 19 U/L (ref 15–37)
BASOPHILS # BLD: 0 K/UL (ref 0–0.2)
BASOPHILS NFR BLD: 1 % (ref 0–2)
BILIRUB SERPL-MCNC: 0.2 MG/DL (ref 0.2–1.1)
BNP SERPL-MCNC: 54 PG/ML
BUN SERPL-MCNC: 31 MG/DL (ref 8–23)
CALCIUM SERPL-MCNC: 8.8 MG/DL (ref 8.3–10.4)
CHLORIDE SERPL-SCNC: 105 MMOL/L (ref 98–107)
CO2 SERPL-SCNC: 27 MMOL/L (ref 21–32)
CREAT SERPL-MCNC: 1.15 MG/DL (ref 0.6–1)
DIFFERENTIAL METHOD BLD: ABNORMAL
EOSINOPHIL # BLD: 0.1 K/UL (ref 0–0.8)
EOSINOPHIL NFR BLD: 2 % (ref 0.5–7.8)
ERYTHROCYTE [DISTWIDTH] IN BLOOD BY AUTOMATED COUNT: 12.4 %
GLOBULIN SER CALC-MCNC: 3.9 G/DL (ref 2.3–3.5)
GLUCOSE SERPL-MCNC: 90 MG/DL (ref 65–100)
HCT VFR BLD AUTO: 37.6 % (ref 35.8–46.3)
HGB BLD-MCNC: 12.2 G/DL (ref 11.7–15.4)
IMM GRANULOCYTES # BLD: 0 K/UL (ref 0–0.5)
IMM GRANULOCYTES NFR BLD AUTO: 1 % (ref 0–5)
INR PPP: 1
LIPASE SERPL-CCNC: 159 U/L (ref 73–393)
LYMPHOCYTES # BLD: 1.7 K/UL (ref 0.5–4.6)
LYMPHOCYTES NFR BLD: 22 % (ref 13–44)
MAGNESIUM SERPL-MCNC: 1.9 MG/DL (ref 1.8–2.4)
MCH RBC QN AUTO: 31.4 PG (ref 26.1–32.9)
MCHC RBC AUTO-ENTMCNC: 32.4 G/DL (ref 31.4–35)
MCV RBC AUTO: 96.7 FL (ref 79.6–97.8)
MONOCYTES # BLD: 0.8 K/UL (ref 0.1–1.3)
MONOCYTES NFR BLD: 11 % (ref 4–12)
NEUTS SEG # BLD: 4.8 K/UL (ref 1.7–8.2)
NEUTS SEG NFR BLD: 64 % (ref 43–78)
NRBC # BLD: 0 K/UL (ref 0–0.2)
PLATELET # BLD AUTO: 348 K/UL (ref 150–450)
PMV BLD AUTO: 9.3 FL (ref 9.4–12.3)
POTASSIUM SERPL-SCNC: 4.4 MMOL/L (ref 3.5–5.1)
PROT SERPL-MCNC: 7.2 G/DL (ref 6.3–8.2)
PROTHROMBIN TIME: 12.9 SEC (ref 11.5–14.5)
RBC # BLD AUTO: 3.89 M/UL (ref 4.05–5.2)
SODIUM SERPL-SCNC: 138 MMOL/L (ref 136–145)
WBC # BLD AUTO: 7.5 K/UL (ref 4.3–11.1)

## 2018-11-04 PROCEDURE — 83880 ASSAY OF NATRIURETIC PEPTIDE: CPT

## 2018-11-04 PROCEDURE — 74011250636 HC RX REV CODE- 250/636: Performed by: EMERGENCY MEDICINE

## 2018-11-04 PROCEDURE — 85610 PROTHROMBIN TIME: CPT

## 2018-11-04 PROCEDURE — 99284 EMERGENCY DEPT VISIT MOD MDM: CPT | Performed by: EMERGENCY MEDICINE

## 2018-11-04 PROCEDURE — 85025 COMPLETE CBC W/AUTO DIFF WBC: CPT

## 2018-11-04 PROCEDURE — 80053 COMPREHEN METABOLIC PANEL: CPT

## 2018-11-04 PROCEDURE — 71045 X-RAY EXAM CHEST 1 VIEW: CPT

## 2018-11-04 PROCEDURE — 83690 ASSAY OF LIPASE: CPT

## 2018-11-04 PROCEDURE — 96360 HYDRATION IV INFUSION INIT: CPT | Performed by: EMERGENCY MEDICINE

## 2018-11-04 PROCEDURE — 85730 THROMBOPLASTIN TIME PARTIAL: CPT

## 2018-11-04 PROCEDURE — 83735 ASSAY OF MAGNESIUM: CPT

## 2018-11-04 RX ADMIN — SODIUM CHLORIDE 500 ML: 900 INJECTION, SOLUTION INTRAVENOUS at 17:40

## 2018-11-04 NOTE — DISCHARGE INSTRUCTIONS
Her chest x-ray and laboratory values are unremarkable today. Vital signs within normal.  Continue to monitor her symptoms and return should she develop anything new or worrisome.

## 2018-11-04 NOTE — ED NOTES
Patient arrives via [place] from [facility] called out for wheezing, low O2 sats, 76% on room air. They gave 2 breathing treatments and 3L of O2, facility could only get O2 sats up to 85%. Hands cold per EMS. Hot pack placed in hand and O2 sats are 100% on 3L. HR 80s, 98.1. Patient reports dry cough for several days.

## 2018-11-04 NOTE — ED PROVIDER NOTES
Very pleasant 55-year-old female presenting to the emergency department after being found to have low O2 sats at her nursing facility. Staff apparently called her family to tell her that they cannot get her oxygen saturations up. They thought she was wheezing so they gave her a breathing treatment. They checked back on her later that afternoon her oxygen saturations were still severity of her another breathing treatment. The family member was denied by the patient is being transferred to the ER for further evaluation. According to the family member the patient often has cold hands in the peripheral pulse ox is don't work very well and her hands are cold. The patient states she doesn't feel great but she is not short of breath does not have any specific complaints at this time. The history is provided by the patient and a relative. Shortness of Breath This is a recurrent problem. Past Medical History:  
Diagnosis Date  Acute myocardial infarction of other anterior wall, initial episode of care ([address] 8/30/2012 MI - Had a stent placed August 31,2012  Acute systolic congestive heart failure- in setting of anterior MI, EF 35-40% 8/30/2012  Acute systolic heart failure ([address]   
 resolved at discharge, EF 35% at time of MI, 55% by echo prior to discharge  ARF (acute renal failure) ([address] 9/29/2012  Arthritis  CAD (coronary artery disease) MI  
 Carotid artery stenosis without cerebral infarction  Chronic pain- chronic narcotic use for spinal stenosis 8/30/2012  Coronary atherosclerosis of native coronary artery 3/16/2013  Diarrhea 10/2/2012  Dyslipidemia 8/30/2012  Dyspnea   
 unspecified  Essential hypertension, benign 8/30/2012  Heart failure ([address] denies heart failure  Hyperlipidemia   
 other unsp dyslipidemia  Hypertension   
 controlled, benign  Ischemic colitis ([address]  Lower GI bleed 3/12/2015  Myocardial infarction, anterolateral wall, subsequent care [facility])   
 s/p 2.5mm Xience stent to focal lesion in mid LAD 8/2012  N&V (nausea and vomiting) 3/16/2013  Sepsis ([facility] 2/12/2014  Spinal stenosis 8/30/2012  ST elevation (STEMI) myocardial infarction involving left anterior descending coronary artery ([facility]  Syncope and collapse 2/12/2014 Past Surgical History:  
Procedure Laterality Date  CARDIAC SURG PROCEDURE UNLIST    
 stent  HX APPENDECTOMY  HX CHOLECYSTECTOMY  HX CORONARY STENT PLACEMENT    
 2012 for MI  
 HX GYN    
 hysterectomy  HX ORTHOPAEDIC    
 moreno knees, ankle and spinal cord stimulator  HX OTHER SURGICAL    
 cord stimulator for pain control removed Family History:  
Problem Relation Age of Onset  Hypertension Mother  Stroke Mother  Hypertension Father Social History Socioeconomic History  Marital status:  Spouse name: Not on file  Number of children: Not on file  Years of education: Not on file  Highest education level: Not on file Social Needs  Financial resource strain: Not on file  Food insecurity - worry: Not on file  Food insecurity - inability: Not on file  Transportation needs - medical: Not on file  Transportation needs - non-medical: Not on file Occupational History  Not on file Tobacco Use  Smoking status: Never Smoker  Smokeless tobacco: Never Used Substance and Sexual Activity  Alcohol use: No  
  Alcohol/week: 0.0 oz  Drug use: No  
 Sexual activity: Not on file Other Topics Concern  Not on file Social History Narrative Lives at home independently Has 2 children  Has living will, DNR Hanna Najera DO  
   
   
 
 
 
ALLERGIES: Tramadol; Amitiza [lubiprostone]; Macrobid [nitrofurantoin monohyd/m-cryst]; Morphine; Other medication; and Sulfa (sulfonamide antibiotics) Review of Systems Respiratory: Positive for shortness of breath. Vitals:  
 11/04/18 1610 BP: 118/59 Pulse: 80 Resp: 16 Temp: 97.9 °F (36.6 °C) SpO2: 98% Weight: 57.2 kg (126 lb) Height: 4' 11\" (1.499 m) Physical Exam  
Constitutional: She is oriented to person, place, and time. She appears well-developed and well-nourished. HENT:  
Head: Normocephalic and atraumatic. Eyes: Conjunctivae are normal. Pupils are equal, round, and reactive to light. Neck: Neck supple. Cardiovascular: Normal rate and regular rhythm. Pulmonary/Chest: Effort normal and breath sounds normal.  
Abdominal: Soft. Bowel sounds are normal.  
Musculoskeletal: Normal range of motion. Neurological: She is alert and oriented to person, place, and time. Skin: Skin is warm and dry. Nursing note and vitals reviewed. MDM Number of Diagnoses or Management Options Fatigue, unspecified type:  
Diagnosis management comments: 59-year-old female presenting because she had concerning oxygen saturation numbers at her nursing facility. The patient clinically looks well. She has no complaints of shortness of breath. Her lung sounds are clear. We will get a chest x-ray and basic labs. We'll monitor the patient for several hours. Amount and/or Complexity of Data Reviewed Clinical lab tests: ordered and reviewed (No significant abnormality) Tests in the radiology section of CPT®: ordered and reviewed Tests in the medicine section of CPT®: ordered and reviewed Decide to obtain previous medical records or to obtain history from someone other than the patient: yes Independent visualization of images, tracings, or specimens: yes (Clear lung fields.) Risk of Complications, Morbidity, and/or Mortality Presenting problems: moderate Diagnostic procedures: moderate Management options: moderate General comments: The patient is essentially had a negative workup.   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 Her hands up to O2 sats go up. Her daughter at the bedside states this happened to her when she lived with her. Patient appears clinically well. I see no indication for further workup. I have personally reviewed the patient's vital signs, laboratory values, and/or imaging. I discussed the findings with the patient including all abnormal laboratory values and their clinical significance. .I gave her clear return precautions should the patient's symptoms change or worsen. The patient expressed understanding and was discharged in stable condition. Patient Progress Patient progress: improved Procedures

## 2020-01-31 ENCOUNTER — HOSPITAL ENCOUNTER (OUTPATIENT)
Dept: LAB | Age: 85
Discharge: HOME | End: 2020-01-31

## 2020-01-31 LAB
ANION GAP SERPL CALC-SCNC: 8 MMOL/L (ref 7–16)
APPEARANCE UR: ABNORMAL
BASOPHILS # BLD: 0 K/UL (ref 0–0.2)
BASOPHILS NFR BLD: 0 % (ref 0–2)
BILIRUB UR QL: NEGATIVE
BUN SERPL-MCNC: 20 MG/DL (ref 8–23)
CALCIUM SERPL-MCNC: 8.9 MG/DL (ref 8.3–10.4)
CHLORIDE SERPL-SCNC: 105 MMOL/L (ref 98–107)
CO2 SERPL-SCNC: 26 MMOL/L (ref 21–32)
COLOR UR: YELLOW
CREAT SERPL-MCNC: 1.17 MG/DL (ref 0.6–1)
DIFFERENTIAL METHOD BLD: ABNORMAL
EOSINOPHIL # BLD: 0.1 K/UL (ref 0–0.8)
EOSINOPHIL NFR BLD: 1 % (ref 0.5–7.8)
ERYTHROCYTE [DISTWIDTH] IN BLOOD BY AUTOMATED COUNT: 14.6 % (ref 11.9–14.6)
GLUCOSE SERPL-MCNC: 146 MG/DL (ref 65–100)
GLUCOSE UR STRIP.AUTO-MCNC: NEGATIVE MG/DL
HCT VFR BLD AUTO: 28 % (ref 35.8–46.3)
HGB BLD-MCNC: 8.3 G/DL (ref 11.7–15.4)
HGB UR QL STRIP: ABNORMAL
IMM GRANULOCYTES # BLD AUTO: 0 K/UL (ref 0–0.5)
IMM GRANULOCYTES NFR BLD AUTO: 1 % (ref 0–5)
KETONES UR QL STRIP.AUTO: NEGATIVE MG/DL
LEUKOCYTE ESTERASE UR QL STRIP.AUTO: ABNORMAL
LYMPHOCYTES # BLD: 1.3 K/UL (ref 0.5–4.6)
LYMPHOCYTES NFR BLD: 17 % (ref 13–44)
MCH RBC QN AUTO: 26.6 PG (ref 26.1–32.9)
MCHC RBC AUTO-ENTMCNC: 29.6 G/DL (ref 31.4–35)
MCV RBC AUTO: 89.7 FL (ref 79.6–97.8)
MONOCYTES # BLD: 0.7 K/UL (ref 0.1–1.3)
MONOCYTES NFR BLD: 8 % (ref 4–12)
NEUTS SEG # BLD: 5.7 K/UL (ref 1.7–8.2)
NEUTS SEG NFR BLD: 73 % (ref 43–78)
NITRITE UR QL STRIP.AUTO: POSITIVE
NRBC # BLD: 0 K/UL (ref 0–0.2)
PH UR STRIP: 6 [PH] (ref 5–9)
PLATELET # BLD AUTO: 547 K/UL (ref 150–450)
PMV BLD AUTO: 9.5 FL (ref 9.4–12.3)
POTASSIUM SERPL-SCNC: 3.6 MMOL/L (ref 3.5–5.1)
PROT UR STRIP-MCNC: 30 MG/DL
RBC # BLD AUTO: 3.12 M/UL (ref 4.05–5.2)
SODIUM SERPL-SCNC: 139 MMOL/L (ref 136–145)
SP GR UR REFRACTOMETRY: 1.01 (ref 1–1.02)
UROBILINOGEN UR QL STRIP.AUTO: 0.2 EU/DL (ref 0.2–1)
WBC # BLD AUTO: 7.9 K/UL (ref 4.3–11.1)

## 2020-01-31 PROCEDURE — 85025 COMPLETE CBC W/AUTO DIFF WBC: CPT

## 2020-01-31 PROCEDURE — 81001 URINALYSIS AUTO W/SCOPE: CPT

## 2020-01-31 PROCEDURE — 80048 BASIC METABOLIC PNL TOTAL CA: CPT

## 2020-03-07 ENCOUNTER — HOSPITAL ENCOUNTER (OUTPATIENT)
Dept: LAB | Age: 85
Discharge: HOME | End: 2020-03-07

## 2020-03-07 LAB
APPEARANCE UR: ABNORMAL
BACTERIA URNS QL MICRO: ABNORMAL /HPF
BILIRUB UR QL: NEGATIVE
COLOR UR: YELLOW
EPI CELLS #/AREA URNS HPF: ABNORMAL /HPF
GLUCOSE UR STRIP.AUTO-MCNC: NEGATIVE MG/DL
HGB UR QL STRIP: ABNORMAL
KETONES UR QL STRIP.AUTO: NEGATIVE MG/DL
LEUKOCYTE ESTERASE UR QL STRIP.AUTO: ABNORMAL
NITRITE UR QL STRIP.AUTO: NEGATIVE
OTHER OBSERVATIONS,UCOM: ABNORMAL
PH UR STRIP: 6 [PH] (ref 5–9)
PROT UR STRIP-MCNC: 100 MG/DL
SP GR UR REFRACTOMETRY: 1.02 (ref 1–1.02)
UROBILINOGEN UR QL STRIP.AUTO: 0.2 EU/DL (ref 0.2–1)
WBC URNS QL MICRO: >100 /HPF

## 2020-03-07 PROCEDURE — 87088 URINE BACTERIA CULTURE: CPT

## 2020-03-07 PROCEDURE — 87086 URINE CULTURE/COLONY COUNT: CPT

## 2020-03-07 PROCEDURE — 81001 URINALYSIS AUTO W/SCOPE: CPT

## 2020-03-07 PROCEDURE — 87186 SC STD MICRODIL/AGAR DIL: CPT

## 2020-03-10 LAB
BACTERIA SPEC CULT: ABNORMAL
SERVICE CMNT-IMP: ABNORMAL

## 2022-07-05 NOTE — PROGRESS NOTES
Problem: Interdisciplinary Rounds  Goal: Interdisciplinary Rounds  Outcome: Progressing Towards Goal  Interdisciplinary team rounds were held 3/1/2017 with the following team members:Care Management, Pastoral Care, Patient Relations, Physical Therapy, Physician and . Anticipate patient will be discharged home today. Plan of care discussed. See clinical pathway and/or care plan for interventions and desired outcomes. Detail Level: Detailed

## 2023-11-08 NOTE — ED NOTES
[facility] Medicine Services   Daily Progress Note    Patient Name: Ashley Lin  : 1952  MRN: [id number]  Primary Care Physician:  Jamey Fairchild MD  Date of admission: 2023        Subjective      Chief Complaint: LE swelling and pain    Patient seen and examined this am, feels better than she did when she came yesterday. LE swelling improving    ROS   As above      Objective      Vitals:   Temp:  [98 °F (36.7 °C)-98.5 °F (36.9 °C)] 98 °F (36.7 °C)  Heart Rate:  [118-134] 118  Resp:  [13-24] 17  BP: (133-156)/(72-99) 147/91    Physical Exam   Constitutional:       Appearance: She is awake and alert  Eyes:      Pupils: Pupils are equal, round, and reactive to light.   Cardiovascular:      Rate and Rhythm: Tachycardia present. Rhythm irregular.   Pulmonary:      Effort: Pulmonary effort is normal.      Breath sounds: Normal breath sounds.   Abdominal:      General: Abdomen is flat.      Palpations: Abdomen is soft.   Musculoskeletal:      Right lower leg: Edema present.      Left lower leg: Edema present.      Comments: Inability to move BLE independently with active ROM. Able to hold RLE in elevated position but drifts down. LLE appears more weak. More difficulty holding LLE up. Able to wiggle smitha toes.    Skin:     General: Skin is dry.   Neurological:      Mental Status: She is alert and oriented to person, place, and time.   Psychiatric:         Mood and Affect: Mood normal.      Result Review    Result Review:  I have personally reviewed the results from the time of this admission to 2023 14:54 EST and agree with these findings:  [x]  Laboratory  [x]  Microbiology  [x]  Radiology  []  EKG/Telemetry   []  Cardiology/Vascular   []  Pathology  []  Old records  []  Other          Assessment & Plan      Brief Patient Summary:  Ashley Lin is a 71 y.o. female with past medical history of subdural hematoma, compression fracture, hypothyroidism, chronic urinary  Patient report received from [address] [place] [address], [facility]. Patient care to be assumed at this time. Patient currently located in [facility]. retention, constipation, GERD, depression, asthma and chronic pain and migraines who presented to [facility] on 11/7/2023 complaining of weakness and leg pain and swelling  She was hospitalized at [facility] 10/10/2023 through 10/15/2023 after sustaining a fall at home and treated for subdural hematoma under the care of Dr. Quintanilla with neurosurgery.  It was documented that CAT scans were stable and she was to return for a follow-up CT of the head in 4 weeks.  She also sustained T12 and L1 compression fracture and was placed in a back brace that she is to wear when out of bed or of head of bed greater than 45 degrees until cleared by Dr. Quintanilla.  Patient also noted to have a right distal radius fracture and a wrist brace was placed.  Ortho seen and no new interventions.  She was also seen by Dr. Bolden and urology for chronic urinary retention.  Was documented patient has a bladder stimulator ultrasound was negative for hydronephrosis and creatinine was stable and patient was without urinary symptoms or pain.  Okay to allow for retention and overflow incontinence and only straight cath if patient symptomatic.  She was discharged to [facility] acute rehab on discharge for approximately 10 days and was discharged home on Friday 11/3.  Patient reports initially went home with her son but was brought home today.  She reports is nonambulatory.  Unable to lift lower extremities off bed.  She was seen by PT and recommended return to rehab.     In the ED she is found to have sinus tachycardia verified on EKG.  High-sensitivity troponin 21 and 22 proBNP 502.  Electrolytes within normal limits renal function stable.  TSH 4.120.  Negative for leukocytosis.  Hemoglobin stable.  Denies any shortness of breath cough congestion fever or dysuria.  She reports positive for headache and chronic visual deficits following fall.  For lower extremity pain and edema bilateral duplex was performed and positive for acute  left lower extremity deep vein thrombosis noted in the gastrocnemius.  CT PE was negative for pulmonary emboli but showed left lower lobe groundglass type airspace consistent with pneumonia and small amount of ascites in the left upper quadrant of the abdomen.  As patient clinically had no signs of infection antibiotics were held at this time.      atorvastatin, 10 mg, Oral, Nightly  cetirizine, 10 mg, Oral, Daily  cycloSPORINE, 1 drop, Both Eyes, BID  estradiol, 1 mg, Oral, Daily  furosemide, 20 mg, Intravenous, Daily  gabapentin, 800 mg, Oral, Q8H  levothyroxine, 50 mcg, Oral, Q AM  metoprolol tartrate, 12.5 mg, Oral, Q12H  pantoprazole, 40 mg, Oral, Q AM  prednisoLONE acetate, 1 drop, Both Eyes, Nightly  senna-docusate sodium, 2 tablet, Oral, BID  sertraline, 50 mg, Oral, Nightly  sodium chloride, 10 mL, Intravenous, Q12H  topiramate, 100 mg, Oral, BID             Active Hospital Problems:  Active Hospital Problems    Diagnosis     **Weakness      Plan:   Weakness/ immobility/ bed bound   - pt reports has been non-ambulatory since her fall and admission to [facility] on 10/10/23. She states continued to be non-ambulatory during rehab admission.   - Weakness possibly neurological due to SDH vs compression fracture vs weakness from complicated hospital stay vs pain  - BLE edema and pain appears acute on chronic  - Found to have LLE DVT  - Pt received Lovenox X1 in ED. Holding continuation of AC for treatment of DVT until cleared by neurosurgery as pt with history of recent SDH  - Rpt CT head -tiny right tentorial subdural hematoma  - PT/OT eval   - case management/ social service for placement  - turn q2     Hx SDH  - holding AC at this time until evaluation by neurosurgery  - [facility] by Dr. Quintanilla. Plan was to repeat CTH in 4 wks  - will repeat CT- tiny Rt tentorial SDH  - neurosurgery to see     Tachycardia  - medications adjusted at [facility]  - noted pt started on BB, which she reports  taking as prescribed  - EKG with sinus tachycardia  - denies CP or SOA  - cont lopressor  - resume home medications     BLE pain/ edema  - consider possibly due to DVT vs cardiac vs dependent edema   - continue home lasix   - f/u  2D echo  - troponin 21->22  - Pro      Hx T12-L1 compression fracture  L3-L5 Lumbar fusion  Rt distal radius fracture  - TLSO brace when OOB or HOB > 45degrees  - right wrist brace- pt right handed  - pain control     Hypothyroid  - TSH WNL  - continue synthroid     Chronic urinary retention s/p bladder stimulator  - per record from previous facility pt was evaluated by Urology, Dr. Bolden, and documented that US was negative for hydronephrosis, kidney function stable and pt w/o pain. Ok to allow for overflow. Straight cath if any of above present  - on oxybutynin     Chronic pain/ migraines  - resume home oxy and gabapentin  - monitor for lethargy  - prn Fioricet/ sumatriptan      GERD  - PPI     HLD  - continue statin     DVT prophylaxis:  Mechanical DVT prophylaxis orders are present.    CODE STATUS:    Code Status (Patient has no pulse and is not breathing): CPR (Attempt to Resuscitate)  Medical Interventions (Patient has pulse or is breathing): Full Support      Disposition:  I expect patient to be discharged in 1-2 days.    Electronically signed by Patrick Hawkins MD, 11/08/23, 14:54 EST.  [facility] Hospitalist Team

## 2024-10-04 NOTE — PROGRESS NOTES
ORTH POST OP PROGRESS NOTE    2018  Admit Date: 2018  Admit Diagnosis: Closed right hip fracture [facility])  right hip fracture  Procedure: Procedure(s): FEMUR INSERTION INTRA MEDULLARY NAIL  Post Op day: 1 Day Post-Op      Subjective:     Yarely Andrade is a patient who no  complaints. confused. [place] Poor progression with PT. HGB 7.7 vitals stable      Objective:     Vital Signs:    Blood pressure 134/76, pulse 81, temperature 98 °F (36.7 °C), resp. rate 18, height 4' 8\" (1.422 m), weight 54.9 kg (121 lb), SpO2 98 %. Temp (24hrs), Av.7 °F (36.5 °C), Min:97.1 °F (36.2 °C), Max:98.2 °F (36.8 °C)      701 -  1900  In: 406 [P.O.:400; I.V.:337]  Out: -    190 -  0700  In: 4328 [P.O.:240; I.V.:800]  Out: 850 [Urine:775]    LAB:    Recent Labs      18   0623   HGB  7.7*   WBC  9.2   PLT  256       Physical Exam    General:   Alert and oriented. No acute distress  Lungs:  Respirations unlabored. Extremities: No evidence of cyanosis. Calves soft, nontender. Moves both upper and lower extremities.    Dressing:  clean, dry, and intact  Neuro:  no deficit      Assessment:      Patient Active Problem List   Diagnosis Code    Essential hypertension, benign I10    Dyslipidemia E78.5    Spinal stenosis M48.00    Chronic pain- chronic narcotic use for spinal stenosis G89.29    Coronary atherosclerosis of native coronary artery I25.10    Lewy body dementia G31.83, F02.80    CKD (chronic kidney disease) stage 3, GFR 30-59 ml/min N18.3    Chronic diastolic congestive heart failure (HCC) I50.32    DNR (do not resuscitate) Z66    Closed right hip fracture ([facility] S72.001A       Plan:     Continue PT    Anticipate Discharge To: SNF when medically stable and approved       Signed By: Pritesh Smith PA-C [[email or web address]] : 49-year-old female with chronic low back pain with possible component of discogenic versus myofascial factors

## (undated) DEVICE — AMD ANTIMICROBIAL GAUZE SPONGES,12 PLY USP TYPE VII, 0.2% POLYHEXAMETHYLENE BIGUANIDE HCI (PHMB): Brand: CURITY

## (undated) DEVICE — SUTURE MCRYL SZ 0 L27IN ABSRB VLT CT-1 L36MM 1/2 CIR TAPR Y340H

## (undated) DEVICE — (D)DRAPE ISOL ANTIMC 129X100IN -- DISC BY MFR

## (undated) DEVICE — DRESSING,GAUZE,XEROFORM,CURAD,1"X8",ST: Brand: CURAD

## (undated) DEVICE — REM POLYHESIVE ADULT PATIENT RETURN ELECTRODE: Brand: VALLEYLAB

## (undated) DEVICE — X-LARGE COTTON GLOVE: Brand: DEROYAL

## (undated) DEVICE — INTENDED FOR TISSUE SEPARATION, AND OTHER PROCEDURES THAT REQUIRE A SHARP SURGICAL BLADE TO PUNCTURE OR CUT.: Brand: BARD-PARKER ® STAINLESS STEEL BLADES

## (undated) DEVICE — SURGICAL PROCEDURE PACK BASIC ST FRANCIS

## (undated) DEVICE — 2000CC GUARDIAN II: Brand: GUARDIAN

## (undated) DEVICE — BUTTON SWITCH PENCIL BLADE ELECTRODE, HOLSTER: Brand: EDGE

## (undated) DEVICE — SLIM BODY SKIN STAPLER: Brand: APPOSE ULC

## (undated) DEVICE — 1010 S-DRAPE TOWEL DRAPE 10/BX: Brand: STERI-DRAPE™

## (undated) DEVICE — (D)PREP SKN CHLRAPRP APPL 26ML -- CONVERT TO ITEM 371833

## (undated) DEVICE — 3M™ TEGADERM™ TRANSPARENT FILM DRESSING FRAME STYLE, 1628, 6 IN X 8 IN (15 CM X 20 CM), 10/CT 8CT/CASE: Brand: 3M™ TEGADERM™

## (undated) DEVICE — 3.2MM GUIDE WIRE 400MM

## (undated) DEVICE — ROD RMR L950MM DIA2.5MM W/ EXTN BALL TIP

## (undated) DEVICE — SUTURE MCRYL SZ 2-0 L27IN ABSRB VLT CT-1 L36MM 1/2 CIR TAPR Y339H

## (undated) DEVICE — SOLUTION IV 1000ML 0.9% SOD CHL